# Patient Record
Sex: MALE | Race: WHITE | NOT HISPANIC OR LATINO | Employment: PART TIME | URBAN - METROPOLITAN AREA
[De-identification: names, ages, dates, MRNs, and addresses within clinical notes are randomized per-mention and may not be internally consistent; named-entity substitution may affect disease eponyms.]

---

## 2017-01-10 DIAGNOSIS — R56.9 SEIZURE (HCC): ICD-10-CM

## 2017-01-12 RX ORDER — LAMOTRIGINE 50 MG/1
TABLET, ORALLY DISINTEGRATING ORAL
Qty: 30 TAB | Refills: 6 | Status: SHIPPED | OUTPATIENT
Start: 2017-01-12 | End: 2017-05-15 | Stop reason: SDUPTHER

## 2017-02-19 DIAGNOSIS — R56.9 SEIZURE (HCC): ICD-10-CM

## 2017-02-21 RX ORDER — LEVETIRACETAM 750 MG/1
TABLET, FILM COATED, EXTENDED RELEASE ORAL
Qty: 60 TAB | Refills: 11 | Status: SHIPPED | OUTPATIENT
Start: 2017-02-21 | End: 2018-01-29 | Stop reason: SDUPTHER

## 2017-03-22 ENCOUNTER — OFFICE VISIT (OUTPATIENT)
Dept: PEDIATRICS | Facility: CLINIC | Age: 22
End: 2017-03-22
Payer: COMMERCIAL

## 2017-03-22 VITALS — WEIGHT: 198 LBS | BODY MASS INDEX: 29.33 KG/M2 | HEIGHT: 69 IN

## 2017-03-22 DIAGNOSIS — F41.9 ANXIETY DISORDER, UNSPECIFIED TYPE: ICD-10-CM

## 2017-03-22 DIAGNOSIS — F84.0 AUTISM: ICD-10-CM

## 2017-03-22 PROCEDURE — 90833 PSYTX W PT W E/M 30 MIN: CPT | Performed by: CLINICAL NURSE SPECIALIST

## 2017-03-22 PROCEDURE — 99214 OFFICE O/P EST MOD 30 MIN: CPT | Performed by: CLINICAL NURSE SPECIALIST

## 2017-03-22 RX ORDER — CITALOPRAM HYDROBROMIDE 10 MG/1
10 TABLET ORAL DAILY
Qty: 90 TAB | Refills: 0 | Status: SHIPPED | OUTPATIENT
Start: 2017-03-22 | End: 2017-05-15

## 2017-03-22 NOTE — MR AVS SNAPSHOT
"        Braydon Brothers Deepak   3/22/2017 4:30 PM   Office Visit   MRN: [id number]    Department:  r Med - Pediatrics   Dept Phone:  [phone]    Description:  Male : 1995   Provider:  IRAM Powell           Reason for Visit     Follow-Up     Medication Management           Allergies as of 3/22/2017     No Known Allergies      You were diagnosed with     Autism   [351803]       Anxiety disorder, unspecified type   [1070075]         Vital Signs     Height Weight Body Mass Index Smoking Status          1.753 m (5' 9\") 89.812 kg (198 lb) 29.23 kg/m2 Never Smoker         Basic Information     Date Of Birth Sex Race Ethnicity Preferred Language    1995 Male White Non- English      Your appointments     May 15, 2017  4:20 PM   Follow Up Visit with Melissa P Bloch, M.D.   [facility] Neurology (--)    [address] [place] [address], [address]  [facility] [address] [phone]           You will be receiving a confirmation call a few days before your appointment from our automated call confirmation system.              Problem List              ICD-10-CM Priority Class Noted - Resolved    Autism spectrum disorder F84.0   2015 - Present    Epilepsy (CMS-HCC) G40.909   2015 - Present    Autism F84.0   10/4/2016 - Present    Anxiety disorder F41.9   3/22/2017 - Present      Health Maintenance        Date Due Completion Dates    IMM HEP B VACCINE (1 of 3 - Primary Series) 1996 ---    IMM HEP A VACCINE (1 of 2 - Standard Series) 1997 ---    IMM HPV VACCINE (1 of 3 - Male 3 Dose Series) 2007 ---    IMM VARICELLA (CHICKENPOX) VACCINE (1 of 2 - 2 Dose Adolescent Series) 2009 ---    IMM MENINGOCOCCAL VACCINE (MCV4) (1 of 1) 2012 ---    IMM DTaP/Tdap/Td Vaccine (1 - Tdap) 2015 ---            Current Immunizations     Influenza Vaccine Quad Inj (Pf) 2016  7:06 AM, 10/23/2015  7:46 AM, 2014      Below and/or attached are the medications your provider expects " you to take. Review all of your home medications and newly ordered medications with your provider and/or pharmacist. Follow medication instructions as directed by your provider and/or pharmacist. Please keep your medication list with you and share with your provider. Update the information when medications are discontinued, doses are changed, or new medications (including over-the-counter products) are added; and carry medication information at all times in the event of emergency situations     Allergies:  No Known Allergies          Medications  Valid as of: March 22, 2017 -  4:49 PM    Generic Name Brand Name Tablet Size Instructions for use    Citalopram Hydrobromide (Tab) CELEXA 10 MG Take 1 Tab by mouth every day.        LamoTRIgine (Tab) LAMICTAL 25 MG TAKE ONE TABLET BY MOUTH ONCE DAILY        LamoTRIgine (TABLET DISPERSIBLE) LamoTRIgine 50 MG DISSOLVE ONE TABLET UNDER THE TONGUE ONCE DAILY        LevETIRAcetam (TABLET SR 24 HR) Levetiracetam 750 MG TAKE TWO TABLETS BY MOUTH IN THE EVENING AS NEEDED        .                 Medicines prescribed today were sent to:     [facility] [address] [place] [address] (S), [place] [facility] [place] [address] [facility] [place] ([place]) [place] [address]    Phone: [phone] Fax: [phone]    Open 24 Hours?: No      Medication refill instructions:       If your prescription bottle indicates you have medication refills left, it is not necessary to call your provider’s office. Please contact your pharmacy and they will refill your medication.    If your prescription bottle indicates you do not have any refills left, you may request refills at any time through one of the following ways: The online Designlab system (except Urgent Care), by calling your provider’s office, or by asking your pharmacy to contact your provider’s office with a refill request. Medication refills are processed only during regular business hours and may not be available until the next business day. Your provider may request  additional information or to have a follow-up visit with you prior to refilling your medication.   *Please Note: Medication refills are assigned a new Rx number when refilled electronically. Your pharmacy may indicate that no refills were authorized even though a new prescription for the same medication is available at the pharmacy. Please request the medicine by name with the pharmacy before contacting your provider for a refill.           Qbix Access Code: [email or web address]  Expires: 4/21/2017  4:49 PM    Qbix  A secure, online tool to manage your health information     [employer]’s Qbix® is a secure, online tool that connects you to your personalized health information from the privacy of your home -- day or night - making it very easy for you to manage your healthcare. Once the activation process is completed, you can even access your medical information using the Qbix lionel, which is available for free in the Apple Lionel store or Google Play store.     Qbix provides the following levels of access (as shown below):   My Chart Features   [facility] Doctor [facility]  Specialists [facility]  Urgent  Care Non-[facility]  Primary Care  Doctor   Email your healthcare team securely and privately 24/7 X X X    Manage appointments: schedule your next appointment; view details of past/upcoming appointments X      Request prescription refills. X      View recent personal medical records, including lab and immunizations X X X X   View health record, including health history, allergies, medications X X X X   Read reports about your outpatient visits, procedures, consult and ER notes X X X X   See your discharge summary, which is a recap of your hospital and/or ER visit that includes your diagnosis, lab results, and care plan. X X       How to register for Qbix:  1. Go to  [email or web address].  2. Click on the Sign Up Now box, which takes you to the New Member Sign Up page. You will need to provide the  following information:  a. Enter your Data Impact Access Code exactly as it appears at the top of this page. (You will not need to use this code after you’ve completed the sign-up process. If you do not sign up before the expiration date, you must request a new code.)   b. Enter your date of birth.   c. Enter your home email address.   d. Click Submit, and follow the next screen’s instructions.  3. Create a Data Impact ID. This will be your Data Impact login ID and cannot be changed, so think of one that is secure and easy to remember.  4. Create a Data Impact password. You can change your password at any time.  5. Enter your Password Reset Question and Answer. This can be used at a later time if you forget your password.   6. Enter your e-mail address. This allows you to receive e-mail notifications when new information is available in Data Impact.  7. Click Sign Up. You can now view your health information.    For assistance activating your Data Impact account, call [phone]
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
## 2017-05-15 ENCOUNTER — OFFICE VISIT (OUTPATIENT)
Dept: NEUROLOGY | Facility: MEDICAL CENTER | Age: 22
End: 2017-05-15
Payer: COMMERCIAL

## 2017-05-15 VITALS
WEIGHT: 184 LBS | RESPIRATION RATE: 16 BRPM | DIASTOLIC BLOOD PRESSURE: 76 MMHG | HEART RATE: 75 BPM | OXYGEN SATURATION: 94 % | TEMPERATURE: 97.9 F | BODY MASS INDEX: 27.16 KG/M2 | SYSTOLIC BLOOD PRESSURE: 114 MMHG

## 2017-05-15 DIAGNOSIS — G40.009 PARTIAL IDIOPATHIC EPILEPSY WITH SEIZURES OF LOCALIZED ONSET, NOT INTRACTABLE, WITHOUT STATUS EPILEPTICUS (HCC): ICD-10-CM

## 2017-05-15 DIAGNOSIS — R56.9 SEIZURE (HCC): ICD-10-CM

## 2017-05-15 DIAGNOSIS — F84.0 AUTISM SPECTRUM DISORDER: ICD-10-CM

## 2017-05-15 PROCEDURE — 99214 OFFICE O/P EST MOD 30 MIN: CPT | Performed by: PSYCHIATRY & NEUROLOGY

## 2017-05-15 RX ORDER — CITALOPRAM 20 MG/1
20 TABLET ORAL DAILY
Qty: 90 TAB | Refills: 3 | Status: SHIPPED | OUTPATIENT
Start: 2017-05-15 | End: 2018-09-17 | Stop reason: SDUPTHER

## 2017-05-15 RX ORDER — LAMOTRIGINE 50 MG/1
TABLET, ORALLY DISINTEGRATING ORAL
Qty: 30 TAB | Refills: 6 | Status: SHIPPED | OUTPATIENT
Start: 2017-05-15 | End: 2018-01-29 | Stop reason: SDUPTHER

## 2017-05-15 NOTE — MR AVS SNAPSHOT
Braydon Brothers Deepak   5/15/2017 4:20 PM   Office Visit   MRN: [id number]    Department:  [facility]   Dept Phone:  [phone]    Description:  Male : 1995   Provider:  Melissa P Bloch, M.D.           Reason for Visit     Follow-Up           Allergies as of 5/15/2017     No Known Allergies      You were diagnosed with     Partial idiopathic epilepsy with seizures of localized onset, not intractable, without status epilepticus (CMS-HCC)   [1815030]       Autism spectrum disorder   [465754]       Seizure (CMS-HCC)   [785192]         Vital Signs     Blood Pressure Pulse Temperature Respirations Weight Oxygen Saturation    114/76 mmHg 75 36.6 °C (97.9 °F) 16 83.462 kg (184 lb) 94%    Smoking Status                   Never Smoker            Basic Information     Date Of Birth Sex Race Ethnicity Preferred Language    1995 Male White Non- English      Your appointments     May 24, 2017  5:00 PM   NEW TO YOU with Megan Burciaga M.D.   [address] [place] [id number] [phone]            2017  4:20 PM   Follow Up Visit with Melissa P Bloch, M.D.   [facility] Neu[facility] (--)    [address] [place] [address], [address]  [place] [address] [phone]           You will be receiving a confirmation call a few days before your appointment from our automated call confirmation system.              Problem List              ICD-10-CM Priority Class Noted - Resolved    Autism spectrum disorder F84.0   2015 - Present    Epilepsy (CMS-HCC) G40.909   2015 - Present    Autism F84.0   10/4/2016 - Present    Anxiety disorder F41.9   3/22/2017 - Present      Health Maintenance        Date Due Completion Dates    IMM HEP B VACCINE (1 of 3 - Primary Series) 1996 ---    IMM HEP A VACCINE (1 of 2 - Standard Series) 1997 ---    IMM HPV VACCINE (1 of 3 - Male 3 Dose Series) 2007 ---    IMM VARICELLA (CHICKENPOX)  VACCINE (1 of 2 - 2 Dose Adolescent Series) 6/18/2009 ---    IMM MENINGOCOCCAL VACCINE (MCV4) (1 of 1) 6/18/2012 ---    IMM DTaP/Tdap/Td Vaccine (1 - Tdap) 6/18/2015 ---            Current Immunizations     Influenza Vaccine Quad Inj (Pf) 11/5/2016  7:06 AM, 10/23/2015  7:46 AM, 9/27/2014      Below and/or attached are the medications your provider expects you to take. Review all of your home medications and newly ordered medications with your provider and/or pharmacist. Follow medication instructions as directed by your provider and/or pharmacist. Please keep your medication list with you and share with your provider. Update the information when medications are discontinued, doses are changed, or new medications (including over-the-counter products) are added; and carry medication information at all times in the event of emergency situations     Allergies:  No Known Allergies          Medications  Valid as of: May 15, 2017 -  4:42 PM    Generic Name Brand Name Tablet Size Instructions for use    Citalopram Hydrobromide (Tab) CELEXA 20 MG Take 1 Tab by mouth every day.        LamoTRIgine (Tab) LAMICTAL 25 MG TAKE ONE TABLET BY MOUTH ONCE DAILY        LamoTRIgine (TABLET DISPERSIBLE) LamoTRIgine 50 MG DISSOLVE ONE TABLET UNDER THE TONGUE ONCE DAILY        LevETIRAcetam (TABLET SR 24 HR) Levetiracetam 750 MG TAKE TWO TABLETS BY MOUTH IN THE EVENING AS NEEDED        .                 Medicines prescribed today were sent to:     [facility] [place] ([place]), [place] - [address] [id number] [facility] [place] ([place]) [place] [address]    Phone: [phone] Fax: [phone]    Open 24 Hours?: No      Medication refill instructions:       If your prescription bottle indicates you have medication refills left, it is not necessary to call your provider’s office. Please contact your pharmacy and they will refill your medication.    If your prescription bottle indicates you do not have any refills left, you may request refills at any  time through one of the following ways: The online Thing5 system (except Urgent Care), by calling your provider’s office, or by asking your pharmacy to contact your provider’s office with a refill request. Medication refills are processed only during regular business hours and may not be available until the next business day. Your provider may request additional information or to have a follow-up visit with you prior to refilling your medication.   *Please Note: Medication refills are assigned a new Rx number when refilled electronically. Your pharmacy may indicate that no refills were authorized even though a new prescription for the same medication is available at the pharmacy. Please request the medicine by name with the pharmacy before contacting your provider for a refill.           Thing5 Access Code: [email or web address]  Expires: 6/14/2017  4:39 PM    Thing5  A secure, online tool to manage your health information     [employer]s Thing5® is a secure, online tool that connects you to your personalized health information from the privacy of your home -- day or night - making it very easy for you to manage your healthcare. Once the activation process is completed, you can even access your medical information using the Thing5 lionel, which is available for free in the Apple Lionel store or Google Play store.     Thing5 provides the following levels of access (as shown below):   My Chart Features   [facility] Primary Care Doctor [facility]  Specialists [facility]  Urgent  Care Non-[facility]  Primary Care  Doctor   Email your healthcare team securely and privately 24/7 X X X    Manage appointments: schedule your next appointment; view details of past/upcoming appointments X      Request prescription refills. X      View recent personal medical records, including lab and immunizations X X X X   View health record, including health history, allergies, medications X X X X   Read reports about your outpatient visits, procedures,  consult and ER notes X X X X   See your discharge summary, which is a recap of your hospital and/or ER visit that includes your diagnosis, lab results, and care plan. X X       How to register for mytrax:  1. Go to  [email or web address].  2. Click on the Sign Up Now box, which takes you to the New Member Sign Up page. You will need to provide the following information:  a. Enter your mytrax Access Code exactly as it appears at the top of this page. (You will not need to use this code after you’ve completed the sign-up process. If you do not sign up before the expiration date, you must request a new code.)   b. Enter your date of birth.   c. Enter your home email address.   d. Click Submit, and follow the next screen’s instructions.  3. Create a SHIMAUMA Print Systemt ID. This will be your SHIMAUMA Print Systemt login ID and cannot be changed, so think of one that is secure and easy to remember.  4. Create a SHIMAUMA Print Systemt password. You can change your password at any time.  5. Enter your Password Reset Question and Answer. This can be used at a later time if you forget your password.   6. Enter your e-mail address. This allows you to receive e-mail notifications when new information is available in mytrax.  7. Click Sign Up. You can now view your health information.    For assistance activating your mytrax account, call [phone]
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
## 2017-05-23 ENCOUNTER — TELEPHONE (OUTPATIENT)
Dept: MEDICAL GROUP | Age: 22
End: 2017-05-23

## 2017-05-23 NOTE — TELEPHONE ENCOUNTER
NEW PATIENT VISIT PRE-VISIT PLANNING    1.  EpicCare Patient is checked in Patient Demographics? YES    2.  Immunizations were updated in [facility] using WebIZ?: Yes       •  Web Iz Recommendations: HPV MENACTRA (MCV4)    3.  Patient is due for the following Health Maintenance Topics:   Health Maintenance Due   Topic Date Due   • IMM HPV VACCINE (1 of 3 - Male 3 Dose Series) 06/18/2007   • IMM MENINGOCOCCAL VACCINE (MCV4) (2 of 2) 06/18/2012           4.  Reviewed/Updated the following with patient:       •   Preferred Pharmacy? NO       •   Preferred Lab? NO       •   Medications? NO       •   Social History? NO       •   Family History? NO    5.  Updated Care Team?       •   DME Company (gait device, O2, CPAP, etc.) N\A       •   Other Specialists (eye doctor, derm, GYN, cardiology, endo, etc): N\A    6.  Patient was informed to arrive 15 min prior to their scheduled appointment and bring in their medication bottles? NO

## 2017-05-24 ENCOUNTER — OFFICE VISIT (OUTPATIENT)
Dept: MEDICAL GROUP | Age: 22
End: 2017-05-24
Payer: COMMERCIAL

## 2017-05-24 VITALS
DIASTOLIC BLOOD PRESSURE: 70 MMHG | OXYGEN SATURATION: 97 % | TEMPERATURE: 98.6 F | BODY MASS INDEX: 30.39 KG/M2 | HEART RATE: 89 BPM | WEIGHT: 205.2 LBS | HEIGHT: 69 IN | SYSTOLIC BLOOD PRESSURE: 124 MMHG

## 2017-05-24 DIAGNOSIS — Z23 NEED FOR VACCINATION: ICD-10-CM

## 2017-05-24 DIAGNOSIS — G40.001 PARTIAL IDIOPATHIC EPILEPSY WITH SEIZURES OF LOCALIZED ONSET, NOT INTRACTABLE, WITH STATUS EPILEPTICUS (HCC): ICD-10-CM

## 2017-05-24 DIAGNOSIS — F84.0 AUTISM SPECTRUM DISORDER: ICD-10-CM

## 2017-05-24 DIAGNOSIS — E66.9 OBESITY (BMI 30-39.9): ICD-10-CM

## 2017-05-24 PROCEDURE — 99204 OFFICE O/P NEW MOD 45 MIN: CPT | Mod: 25 | Performed by: FAMILY MEDICINE

## 2017-05-24 PROCEDURE — 90651 9VHPV VACCINE 2/3 DOSE IM: CPT | Performed by: FAMILY MEDICINE

## 2017-05-24 PROCEDURE — 90734 MENACWYD/MENACWYCRM VACC IM: CPT | Performed by: FAMILY MEDICINE

## 2017-05-24 PROCEDURE — 90472 IMMUNIZATION ADMIN EACH ADD: CPT | Performed by: FAMILY MEDICINE

## 2017-05-24 PROCEDURE — 90471 IMMUNIZATION ADMIN: CPT | Performed by: FAMILY MEDICINE

## 2017-05-24 RX ORDER — LORAZEPAM 1 MG/1
1 TABLET ORAL EVERY 4 HOURS PRN
COMMUNITY
End: 2020-01-06 | Stop reason: SDUPTHER

## 2017-05-24 RX ORDER — CITALOPRAM HYDROBROMIDE 10 MG/1
TABLET ORAL
COMMUNITY
Start: 2017-04-08 | End: 2017-11-13

## 2017-05-24 ASSESSMENT — PATIENT HEALTH QUESTIONNAIRE - PHQ9: CLINICAL INTERPRETATION OF PHQ2 SCORE: 0

## 2017-05-25 PROBLEM — Z23 NEED FOR VACCINATION: Status: ACTIVE | Noted: 2017-05-25

## 2017-05-25 PROBLEM — E66.9 OBESITY (BMI 30-39.9): Status: ACTIVE | Noted: 2017-05-25

## 2017-05-25 NOTE — PROGRESS NOTES
This medical record contains text that has been entered with the assistance of computer voice recognition and dictation software.  Therefore, it may contain unintended errors in text, spelling, punctuation, or grammar    Chief Complaint   Patient presents with   • Other     see reason for visit       Braydon Sotelo is a 21 y.o. male here evaluation and management of: Establish care, autism, epilepsy, obesity      HPI:     Autism spectrum disorder  He was diagnosed at age 4  Genetic testing was performed by Dr. 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 Bloch in neurology      Obesity (BMI 30-39.9)  Patient knows that he can improve with diet and exercise. He has not been focusing on exercise much, his mother agreed to help with the diet. denies any new fatigue, cold/heat intolerance, weight gain/weight loss, diarrhea/constipation, dry skin, myalgia, depressed mood, palpitations, tremmor, hair loss, and no goiter.        Current medicines (including changes today)  Current Outpatient Prescriptions   Medication Sig Dispense Refill   • citalopram (CELEXA) 10 MG tablet      • lorazepam (ATIVAN) 1 MG Tab Take 1 mg by mouth every four hours as needed for Anxiety.     • LamoTRIgine 50 MG TABLET DISPERSIBLE DISSOLVE ONE TABLET UNDER THE TONGUE ONCE DAILY 30 Tab 6   • Levetiracetam 750 MG TABLET SR 24 HR TAKE TWO TABLETS BY MOUTH IN THE EVENING AS NEEDED  "60 Tab 11   • citalopram (CELEXA) 20 MG Tab Take 1 Tab by mouth every day. 90 Tab 3   • lamotrigine (LAMICTAL) 25 MG Tab TAKE ONE TABLET BY MOUTH ONCE DAILY (Patient not taking: Reported on 5/24/2017) 30 Tab 0     No current facility-administered medications for this visit.     He  has a past medical history of Autism; Autism spectrum disorder; and Seizure (CMS-HCC).  He  has past surgical history that includes tonsillectomy and adenoidectomy and other (1/10/2014).  Social History   Substance Use Topics   • Smoking status: Never Smoker    • Smokeless tobacco: Never Used   • Alcohol Use: No     Social History     Social History Narrative     History reviewed. No pertinent family history.  No family status information on file.         ROS  Please see history of present illness    All other systems reviewed and are negative     Objective:     Blood pressure 124/70, pulse 89, temperature 37 °C (98.6 °F), height 1.753 m (5' 9.02\"), weight 93.078 kg (205 lb 3.2 oz), SpO2 97 %. Body mass index is 30.29 kg/(m^2).  Physical Exam:    Constitutional: Alert, no distress, does make eye contact  Skin: Warm, dry, good turgor, no rashes in visible areas.  Eye: Equal, round and reactive, conjunctiva clear, lids normal.  ENMT: Lips without lesions, good dentition, oropharynx clear.  Neck: Trachea midline, no masses, no thyromegaly. No cervical or supraclavicular lymphadenopathy.  Respiratory: Unlabored respiratory effort, lungs clear to auscultation, no wheezes, no ronchi.  Cardiovascular: Normal S1, S2, no murmur, no edema.  Abdomen: Soft, non-tender, no masses, no hepatosplenomegaly.  Psych: Alert and oriented x3, normal affect and mood.          Assessment and Plan:   The following treatment plan was discussed, again this medical record contains text that has been entered with the assistance of computer voice recognition and dictation software.  Therefore, it may contain unintended errors in text, spelling, punctuation, or " grammar      1. Autism spectrum disorder  Patient has been stable with current management  We will make no changes for now    2. Need for vaccination  Given today  - GARDASIL 9  - MENINGOCOCCAL CONJUGATE VACCINE 4-VALENT IM      3. Partial idiopathic epilepsy with seizures of localized onset, not intractable, with status epilepticus (CMS-HCC)  No seizures since March 2016  Continue current management  Avoid triggers such as fatigue, dehydration, and alcohol    4. Obesity (BMI 30-39.9)    We discussed agressive lifestyle modifications with weight loss, aerobic exercise, and eating a prudent diet, which  included avoiding fried foods, using low-fat milk and avoiding simple carbohydrate, making a food diary. If you can't run because of pain do the stationary bike/elipticle or swim 30minutes 3 times per wk, in the beginning and then gradually increase.        HEALTH MAINTENANCE: up to date    Followup: Return in about 3 months (around 8/24/2017) for Medication refill.

## 2017-05-25 NOTE — ASSESSMENT & PLAN NOTE
Patient knows that he can improve with diet and exercise. He has not been focusing on exercise much, his mother agreed to help with the diet. denies any new fatigue, cold/heat intolerance, weight gain/weight loss, diarrhea/constipation, dry skin, myalgia, depressed mood, palpitations, tremmor, hair loss, and no goiter.

## 2017-05-25 NOTE — ASSESSMENT & PLAN NOTE
Triggered by fatigue  Last seizure was in March 2016  Complies with Keppra 1500mg po qhs  Ativan 1mg prn  Also Lamotrigine 50mg po qam  Managed by Dr. Bloch in neurology

## 2017-05-25 NOTE — ASSESSMENT & PLAN NOTE
He was diagnosed at age 4  Genetic testing was performed by Dr. 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

## 2017-07-25 ENCOUNTER — TELEPHONE (OUTPATIENT)
Dept: MEDICAL GROUP | Age: 22
End: 2017-07-25

## 2017-07-25 DIAGNOSIS — Z23 NEED FOR VACCINATION: ICD-10-CM

## 2017-07-26 ENCOUNTER — NON-PROVIDER VISIT (OUTPATIENT)
Dept: MEDICAL GROUP | Age: 22
End: 2017-07-26
Payer: COMMERCIAL

## 2017-07-26 DIAGNOSIS — Z23 NEED FOR VACCINATION: ICD-10-CM

## 2017-07-26 PROCEDURE — 90471 IMMUNIZATION ADMIN: CPT | Performed by: FAMILY MEDICINE

## 2017-07-26 PROCEDURE — 90651 9VHPV VACCINE 2/3 DOSE IM: CPT | Performed by: FAMILY MEDICINE

## 2017-07-26 NOTE — MR AVS SNAPSHOT
Braydon Sotelo   2017 4:30 PM   Non-Provider Visit   MRN: [id number]    Department:  [address] [place] [address]   Dept Phone:  [phone]    Description:  Male : 1995   Provider:  LUKAS MAYO MA           Reason for Visit     Immunizations HPV #2      Allergies as of 2017     No Known Allergies      Vital Signs     Smoking Status                   Never Smoker            Basic Information     Date Of Birth Sex Race Ethnicity Preferred Language    1995 Male White Non- English      Your appointments     2017  4:20 PM   Follow Up Visit with Melissa P Bloch, M.D.   [facility] (--)    [address], [address]  [place] [address] [phone]           You will be receiving a confirmation call a few days before your appointment from our automated call confirmation system.            2017  4:30 PM   Non Provider 1 with LUKAS MAYO MA   [address])    [address]  [place] [address] [phone]           You will be receiving a confirmation call a few days before your appointment from our automated call confirmation system.              Problem List              ICD-10-CM Priority Class Noted - Resolved    Autism spectrum disorder F84.0   2015 - Present    Epilepsy (CMS-HCC) G40.909   2015 - Present    Autism F84.0   10/4/2016 - Present    Anxiety disorder F41.9   3/22/2017 - Present    Obesity (BMI 30-39.9) E66.9   2017 - Present    Need for vaccination Z23   2017 - Present      Health Maintenance        Date Due Completion Dates    IMM INFLUENZA (1) 2017, 10/23/2015, 2014, 10/14/2013, 10/6/2012, 2011, 10/22/2010, 10/24/2009    IMM HPV VACCINE (3 of 3 - Male 3 Dose Series) 2017, 2017    IMM DTaP/Tdap/Td Vaccine (7 - Td) 2018, 2000, 9/15/1997, 1996, 1996, 1996            Current Immunizations     DTP/HIB Combined Vaccine 1996, 1996, 1996    Dtap Vaccine 9/13/2000, 9/15/1997    HPV 9-VALENT VACCINE (GARDASIL 9) 7/26/2017, 5/24/2017    Hepatitis A Vaccine, Ped/Adol 4/16/2004, 8/15/2002    Hepatitis B Vaccine Non-Recombivax (Ped/Adol) 3/8/2002, 1996, 1996, 1996    IPV 9/13/2000    Influenza TIV (IM) 9/30/2011, 10/22/2010, 10/24/2009    Influenza Vac Subunit Quad Inj (Pf) 10/6/2012    Influenza Vaccine Quad Inj (Pf) 11/5/2016  7:06 AM, 10/23/2015  7:46 AM, 9/27/2014    Influenza Vaccine Quad Inj (Preserved) 10/14/2013    MMR Vaccine 9/13/2000, 9/15/1997    Meningococcal Conjugate Vaccine MCV4 (Menactra) 5/24/2017    Meningococcal Conjugate Vaccine MCV4 (Menveo) 5/5/2008    OPV - Historical Data 1996, 1996, 1996    Tdap Vaccine 4/6/2008    Varicella Vaccine Live 5/5/2008, 6/18/1997      Below and/or attached are the medications your provider expects you to take. Review all of your home medications and newly ordered medications with your provider and/or pharmacist. Follow medication instructions as directed by your provider and/or pharmacist. Please keep your medication list with you and share with your provider. Update the information when medications are discontinued, doses are changed, or new medications (including over-the-counter products) are added; and carry medication information at all times in the event of emergency situations     Allergies:  No Known Allergies          Medications  Valid as of: July 26, 2017 -  4:46 PM    Generic Name Brand Name Tablet Size Instructions for use    Citalopram Hydrobromide (Tab) CELEXA 20 MG Take 1 Tab by mouth every day.        Citalopram Hydrobromide (Tab) CELEXA 10 MG         LamoTRIgine (Tab) LAMICTAL 25 MG TAKE ONE TABLET BY MOUTH ONCE DAILY        LamoTRIgine (TABLET DISPERSIBLE) LamoTRIgine 50 MG DISSOLVE ONE TABLET UNDER THE TONGUE ONCE DAILY        LevETIRAcetam (TABLET SR 24 HR) Levetiracetam 750 MG TAKE TWO  TABLETS BY MOUTH IN THE EVENING AS NEEDED        LORazepam (Tab) ATIVAN 1 MG Take 1 mg by mouth every four hours as needed for Anxiety.        .                 Medicines prescribed today were sent to:     [address] ([address]), [place] [address]    [facility] [place] [address]    Phone: [phone] Fax: [phone]    Open 24 Hours?: No      Medication refill instructions:       If your prescription bottle indicates you have medication refills left, it is not necessary to call your provider’s office. Please contact your pharmacy and they will refill your medication.    If your prescription bottle indicates you do not have any refills left, you may request refills at any time through one of the following ways: The online AppInstitute system (except Urgent Care), by calling your provider’s office, or by asking your pharmacy to contact your provider’s office with a refill request. Medication refills are processed only during regular business hours and may not be available until the next business day. Your provider may request additional information or to have a follow-up visit with you prior to refilling your medication.   *Please Note: Medication refills are assigned a new Rx number when refilled electronically. Your pharmacy may indicate that no refills were authorized even though a new prescription for the same medication is available at the pharmacy. Please request the medicine by name with the pharmacy before contacting your provider for a refill.           AppInstitute Access Code: [email or web address]  Expires: 8/25/2017  4:26 PM    AppInstitute  A secure, online tool to manage your health information     [employer] AppInstitute® is a secure, online tool that connects you to your personalized health information from the privacy of your home -- day or night - making it very easy for you to manage your healthcare. Once the activation process is completed, you can even access your medical information using  the Your Last Chance lionel, which is available for free in the Apple Lionel store or Google Play store.     Your Last Chance provides the following levels of access (as shown below):   My Chart Features   [facility] Doctor [facility]  Specialists [facility]  Urgent  Care Non-[facility]  Primary Care  Doctor   Email your healthcare team securely and privately 24/7 X X X    Manage appointments: schedule your next appointment; view details of past/upcoming appointments X      Request prescription refills. X      View recent personal medical records, including lab and immunizations X X X X   View health record, including health history, allergies, medications X X X X   Read reports about your outpatient visits, procedures, consult and ER notes X X X X   See your discharge summary, which is a recap of your hospital and/or ER visit that includes your diagnosis, lab results, and care plan. X X       How to register for Your Last Chance:  1. Go to  [email or web address].  2. Click on the Sign Up Now box, which takes you to the New Member Sign Up page. You will need to provide the following information:  a. Enter your Your Last Chance Access Code exactly as it appears at the top of this page. (You will not need to use this code after you’ve completed the sign-up process. If you do not sign up before the expiration date, you must request a new code.)   b. Enter your date of birth.   c. Enter your home email address.   d. Click Submit, and follow the next screen’s instructions.  3. Create a Your Last Chance ID. This will be your Your Last Chance login ID and cannot be changed, so think of one that is secure and easy to remember.  4. Create a Your Last Chance password. You can change your password at any time.  5. Enter your Password Reset Question and Answer. This can be used at a later time if you forget your password.   6. Enter your e-mail address. This allows you to receive e-mail notifications when new information is available in Your Last Chance.  7. Click Sign Up. You can now view your health  information.    For assistance activating your Yoopay account, call [phone]

## 2017-07-26 NOTE — PROGRESS NOTES
"Braydon Sotelo is a 22 y.o. male here for a non-provider visit for:   HPV 2 of 3    Reason for immunization: continue or complete series started at the office  Immunization records indicate need for vaccine: Yes, confirmed with Epic  Minimum interval has been met for this vaccine: Yes  ABN completed: Not Indicated    Order and dose verified by: CE  VIS Dated  12/2/16 was given to patient: Yes  All IAC Questionnaire questions were answered “No.\"    Patient tolerated injection and no adverse effects were observed or reported: Yes    Pt scheduled for next dose in series: Yes  "

## 2017-08-17 ENCOUNTER — TELEPHONE (OUTPATIENT)
Dept: NEUROLOGY | Facility: MEDICAL CENTER | Age: 22
End: 2017-08-17

## 2017-08-17 NOTE — TELEPHONE ENCOUNTER
Message: patient called requesting a call back.    Caller: lucas  Call Back #: [phone]  OK to leave detailed message: n    Left message for patient to call back.

## 2017-08-18 NOTE — TELEPHONE ENCOUNTER
Patient called back and wanted to know the dose of the celexa. I told the patient that he's supposed to be taking:   Order Providers      Prescribing Provider Encounter Provider     Melissa P Bloch, M.D. Melissa P Bloch, M.D.       Medication Detail         Disp Refills Start End        citalopram (CELEXA) 20 MG Tab 90 Tab 3/3 5/15/2017       Sig - Route: Take 1 Tab by mouth every day. - Oral      Class: Normal      All information was given and pt and he verbally understood BLR

## 2017-10-25 ENCOUNTER — TELEPHONE (OUTPATIENT)
Dept: MEDICAL GROUP | Age: 22
End: 2017-10-25

## 2017-10-25 DIAGNOSIS — R56.9 SEIZURES (HCC): ICD-10-CM

## 2017-10-25 NOTE — TELEPHONE ENCOUNTER
1. Caller Name: [place] (guillermo)                                         Call Back Number: [phone] (home)         Patient approves a detailed voicemail message: yes    2. SPECIFIC Action To Be Taken: Referral pending, please sign.    3. Diagnosis/Clinical Reason for Request: seizures    4. Specialty & Provider Name/Lab/Imaging Location: Dr. Melissa Bloch    5. Is appointment scheduled for requested order/referral: yes - 11/13/17    Patient informed they will receive a return phone call from the office ONLY if there are any questions before processing their request. Advised to call back if they haven't received a call from the referral department in 5 days.

## 2017-11-13 ENCOUNTER — OFFICE VISIT (OUTPATIENT)
Dept: NEUROLOGY | Facility: MEDICAL CENTER | Age: 22
End: 2017-11-13
Payer: COMMERCIAL

## 2017-11-13 VITALS
SYSTOLIC BLOOD PRESSURE: 118 MMHG | BODY MASS INDEX: 31.4 KG/M2 | HEART RATE: 72 BPM | DIASTOLIC BLOOD PRESSURE: 70 MMHG | RESPIRATION RATE: 16 BRPM | WEIGHT: 212 LBS | TEMPERATURE: 97.3 F | HEIGHT: 69 IN | OXYGEN SATURATION: 96 %

## 2017-11-13 DIAGNOSIS — F84.0 AUTISM SPECTRUM DISORDER: ICD-10-CM

## 2017-11-13 DIAGNOSIS — G40.009 PARTIAL IDIOPATHIC EPILEPSY WITH SEIZURES OF LOCALIZED ONSET, NOT INTRACTABLE, WITHOUT STATUS EPILEPTICUS (HCC): ICD-10-CM

## 2017-11-13 PROCEDURE — 99214 OFFICE O/P EST MOD 30 MIN: CPT | Performed by: PSYCHIATRY & NEUROLOGY

## 2017-11-14 NOTE — ASSESSMENT & PLAN NOTE
Pt has had some issues with a recent relationship. Pt states he was sad for awhile. Pt states that he feels like he needs to do more exercise.

## 2017-11-14 NOTE — PROGRESS NOTES
CHIEF COMPLAINT  Chief Complaint   Patient presents with   • Follow-Up     Seizures       HPI  Braydon Sotelo is a 19 y.o. male who presents for epilepsy evaluation and his autism has been at times problematic as he has difficulty making adjustments.  Epilepsy  No seizures witnessed or that patient I aware about.    Autism spectrum disorder  Pt has had some issues with a recent relationship. Pt states he was sad for awhile. Pt states that he feels like he needs to do more exercise.    REVIEW OF SYSTEMS  Pertinent Positives:insomnia, learning disability, tangential, anxious about seizures, irritable.   All other systems are negative.     PAST MEDICAL HISTORY  Past Medical History:   Diagnosis Date   • Autism    • Autism spectrum disorder    • Seizure (CMS-[facility])     since 2013       SOCIAL HISTORY  Social History     Social History   • Marital status: Single     Spouse name: N/A   • Number of children: N/A   • Years of education: N/A     Occupational History   • Not on file.     Social History Main Topics   • Smoking status: Never Smoker   • Smokeless tobacco: Never Used   • Alcohol use No   • Drug use: No   • Sexual activity: Not on file     Other Topics Concern   • Not on file     Social History Narrative   • No narrative on file       SURGICAL HISTORY  Past Surgical History:   Procedure Laterality Date   • OTHER  1/10/2014    wisdom teeth extraction   • TONSILLECTOMY AND ADENOIDECTOMY         CURRENT MEDICATIONS  Current Outpatient Prescriptions   Medication Sig Dispense Refill   • lorazepam (ATIVAN) 1 MG Tab Take 1 mg by mouth every four hours as needed for Anxiety.     • citalopram (CELEXA) 20 MG Tab Take 1 Tab by mouth every day. 90 Tab 3   • LamoTRIgine 50 MG TABLET DISPERSIBLE DISSOLVE ONE TABLET UNDER THE TONGUE ONCE DAILY 30 Tab 6   • Levetiracetam 750 MG TABLET SR 24 HR TAKE TWO TABLETS BY MOUTH IN THE EVENING AS NEEDED 60 Tab 11     No current facility-administered medications for this visit.   "      ALLERGIES  No Known Allergies    PHYSICAL EXAM  VITAL SIGNS: /70   Pulse 72   Temp 36.3 °C (97.3 °F)   Resp 16   Ht 1.753 m (5' 9\")   Wt 96.2 kg (212 lb)   SpO2 96%   BMI 31.31 kg/m²   Constitutional: Well developed, Well nourished, No acute distress, Non-toxic appearance.   HENT: normocephalic, atraumatic   Eyes: perrl   Neck: Normal range of motion, No tenderness, Supple, No stridor.   Cardiovascular: Normal heart rate, Normal rhythm, No murmurs, No rubs, No gallops.   Thorax & Lungs: Normal breath sounds, No respiratory distress, No wheezing, No chest tenderness.   Abdomen: Bowel sounds normal, Soft, No tenderness, No masses, No pulsatile masses.   Skin: Warm, Dry, No erythema, No rash.   Back: No tenderness, No CVA tenderness.   Extremities: Intact distal pulses, No edema, No tenderness, No cyanosis, No clubbing.   Neurologic: A&OX3, CN:2-12 intact, motor: intact, sensory intact, gait intact   Psychiatric: Affect normal, Judgment normal, Mood normal.   Lab: Reviewed with patient in detail and as noted in results.    EEG  Normal per Dr. Corona    RADIOLOGY/PROCEDURES  Mri normal of the brain    ASSESSMENT AND PLAN  1. Autism spectrum disorder   Patient's behavior is still stable at times so will continue the Celexa to 20 mg. Pt has early am and then he takes a nap and has not done enough exercise.    2. Epilepsy   Pt to be on 1500mg KeppraXR QHS. Continue Lamictal 50 mg mg po q am and consider a transition to lamictal if he tolerates it. Ativan prn breakthrough.       I spent 25 minutes with this patient and his mother, over fifty percent was spent counseling patient on their condition, best management practices, reviewing test results and risks and benefits of treatment.            "

## 2017-11-15 ENCOUNTER — TELEPHONE (OUTPATIENT)
Dept: NEUROLOGY | Facility: MEDICAL CENTER | Age: 22
End: 2017-11-15

## 2017-11-15 NOTE — LETTER
November 16, 2017        Braydon Brothers Sotelo  [address]  [place] [address]        To whom it may concern:    Braydon Sotelo is my patient and is cleared to join a gym and take MMA classes/training.     If you have any questions or concerns, please don't hesitate to call.        Sincerely,          Melissa Bloch MD    Electronically Signed

## 2017-11-15 NOTE — TELEPHONE ENCOUNTER
Patient's mother called needing a letter from Dr Bloch stating that he can do MMA classes and training. Please advise

## 2017-11-29 ENCOUNTER — APPOINTMENT (OUTPATIENT)
Dept: MEDICAL GROUP | Age: 22
End: 2017-11-29
Payer: MEDICAID

## 2017-11-29 ENCOUNTER — TELEPHONE (OUTPATIENT)
Dept: MEDICAL GROUP | Age: 22
End: 2017-11-29

## 2017-11-29 DIAGNOSIS — Z23 NEED FOR VACCINATION: ICD-10-CM

## 2017-12-27 ENCOUNTER — NON-PROVIDER VISIT (OUTPATIENT)
Dept: MEDICAL GROUP | Age: 22
End: 2017-12-27
Payer: COMMERCIAL

## 2017-12-27 PROCEDURE — 90471 IMMUNIZATION ADMIN: CPT | Performed by: FAMILY MEDICINE

## 2017-12-27 PROCEDURE — 90651 9VHPV VACCINE 2/3 DOSE IM: CPT | Performed by: FAMILY MEDICINE

## 2017-12-27 NOTE — PROGRESS NOTES
Braydon Sotelo is a 22 y.o. male here for a non-provider visit for:   HPV 3 of 3    Reason for immunization: continue or complete series started at the office  Immunization records indicate need for vaccine: Yes, confirmed with Epic and confirmed with [place] [employer]  Minimum interval has been met for this vaccine: Yes  ABN completed: Yes    Order and dose verified by: RODNEY  VIS Dated  12/2/16 was given to patient: Yes  IAC Questionnaire abnormal. Questionnaire reviewed and administration of injection approved by provider: Patrica    Patient tolerated injection and no adverse effects were observed or reported: Yes    Pt scheduled for next dose in series: Not Indicated

## 2018-01-03 ENCOUNTER — TELEPHONE (OUTPATIENT)
Dept: NEUROLOGY | Facility: MEDICAL CENTER | Age: 23
End: 2018-01-03

## 2018-01-03 NOTE — TELEPHONE ENCOUNTER
Pt with appt 2/13/18 mom would like a name for a therapist for pt's autism, depression, and anxiety. Mom also mentioned pt is on Celexa. Please advise

## 2018-01-09 DIAGNOSIS — F41.9 ANXIETY: ICD-10-CM

## 2018-01-09 DIAGNOSIS — F84.0 AUTISM: ICD-10-CM

## 2018-01-09 NOTE — TELEPHONE ENCOUNTER
Mother of pt called asking if pt can get referral to therapy for his anxiety, depression and anger management please

## 2018-01-29 DIAGNOSIS — R56.9 SEIZURE (HCC): ICD-10-CM

## 2018-01-29 RX ORDER — LAMOTRIGINE 50 MG/1
TABLET, ORALLY DISINTEGRATING ORAL
Qty: 30 TAB | Refills: 6 | Status: SHIPPED | OUTPATIENT
Start: 2018-01-29 | End: 2018-09-17 | Stop reason: SDUPTHER

## 2018-01-29 RX ORDER — LEVETIRACETAM 750 MG/1
TABLET, FILM COATED, EXTENDED RELEASE ORAL
Qty: 60 TAB | Refills: 11 | Status: SHIPPED | OUTPATIENT
Start: 2018-01-29 | End: 2018-09-17 | Stop reason: SDUPTHER

## 2018-01-29 NOTE — TELEPHONE ENCOUNTER
Would you be able to refill these two medication for pt. Primary insurance is [facility], but secondary is medicaid, they do not want to pay copay. Thank you.

## 2018-02-13 ENCOUNTER — TELEPHONE (OUTPATIENT)
Dept: NEUROLOGY | Facility: MEDICAL CENTER | Age: 23
End: 2018-02-13

## 2018-02-13 ENCOUNTER — OFFICE VISIT (OUTPATIENT)
Dept: NEUROLOGY | Facility: MEDICAL CENTER | Age: 23
End: 2018-02-13
Payer: COMMERCIAL

## 2018-02-13 VITALS
TEMPERATURE: 98.4 F | DIASTOLIC BLOOD PRESSURE: 72 MMHG | HEART RATE: 101 BPM | BODY MASS INDEX: 31.55 KG/M2 | HEIGHT: 69 IN | OXYGEN SATURATION: 96 % | WEIGHT: 213 LBS | SYSTOLIC BLOOD PRESSURE: 118 MMHG

## 2018-02-13 DIAGNOSIS — G40.009 PARTIAL IDIOPATHIC EPILEPSY WITH SEIZURES OF LOCALIZED ONSET, NOT INTRACTABLE, WITHOUT STATUS EPILEPTICUS (HCC): ICD-10-CM

## 2018-02-13 DIAGNOSIS — F84.0 AUTISM SPECTRUM DISORDER: ICD-10-CM

## 2018-02-13 PROCEDURE — 99214 OFFICE O/P EST MOD 30 MIN: CPT | Performed by: PSYCHIATRY & NEUROLOGY
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
## 2018-05-14 ENCOUNTER — OFFICE VISIT (OUTPATIENT)
Dept: NEUROLOGY | Facility: MEDICAL CENTER | Age: 23
End: 2018-05-14
Payer: COMMERCIAL

## 2018-05-14 VITALS
WEIGHT: 220 LBS | OXYGEN SATURATION: 94 % | DIASTOLIC BLOOD PRESSURE: 74 MMHG | HEIGHT: 69 IN | BODY MASS INDEX: 32.58 KG/M2 | TEMPERATURE: 97.2 F | HEART RATE: 71 BPM | SYSTOLIC BLOOD PRESSURE: 108 MMHG

## 2018-05-14 DIAGNOSIS — F84.0 AUTISM SPECTRUM DISORDER: ICD-10-CM

## 2018-05-14 DIAGNOSIS — G40.009 PARTIAL IDIOPATHIC EPILEPSY WITH SEIZURES OF LOCALIZED ONSET, NOT INTRACTABLE, WITHOUT STATUS EPILEPTICUS (HCC): ICD-10-CM

## 2018-05-14 PROCEDURE — 99214 OFFICE O/P EST MOD 30 MIN: CPT | Performed by: PSYCHIATRY & NEUROLOGY

## 2018-05-14 ASSESSMENT — PATIENT HEALTH QUESTIONNAIRE - PHQ9: CLINICAL INTERPRETATION OF PHQ2 SCORE: 0

## 2018-05-14 NOTE — PROGRESS NOTES
"CHIEF COMPLAINT  Chief Complaint   Patient presents with   • Follow-Up     autism       HPI  Braydon Sotelo is a 19 y.o. male who presents for epilepsy evaluation and his autism has been at times problematic as he has difficulty making adjustments.  Epilepsy  No seizures since the last visit.    Autism spectrum disorder  Pt has some sad feeling about girls.    REVIEW OF SYSTEMS  Pertinent Positives:insomnia, learning disability, tangential, anxious about seizures, irritable.   All other systems are negative.     PAST MEDICAL HISTORY  Past Medical History:   Diagnosis Date   • Autism    • Autism spectrum disorder    • Seizure (HCC)     since 2013       SOCIAL HISTORY  Social History     Social History   • Marital status: Single     Spouse name: N/A   • Number of children: N/A   • Years of education: N/A     Occupational History   • Not on file.     Social History Main Topics   • Smoking status: Never Smoker   • Smokeless tobacco: Never Used   • Alcohol use No   • Drug use: No   • Sexual activity: Not on file     Other Topics Concern   • Not on file     Social History Narrative   • No narrative on file       SURGICAL HISTORY  Past Surgical History:   Procedure Laterality Date   • OTHER  1/10/2014    wisdom teeth extraction   • TONSILLECTOMY AND ADENOIDECTOMY         CURRENT MEDICATIONS  Current Outpatient Prescriptions   Medication Sig Dispense Refill   • LamoTRIgine 50 MG TABLET DISPERSIBLE DISSOLVE ONE TABLET UNDER THE TONGUE ONCE DAILY 30 Tab 6   • Levetiracetam 750 MG TABLET SR 24 HR TAKE TWO TABLETS BY MOUTH IN THE EVENING AS NEEDED 60 Tab 11   • lorazepam (ATIVAN) 1 MG Tab Take 1 mg by mouth every four hours as needed for Anxiety.     • citalopram (CELEXA) 20 MG Tab Take 1 Tab by mouth every day. 90 Tab 3     No current facility-administered medications for this visit.        ALLERGIES  No Known Allergies    PHYSICAL EXAM  VITAL SIGNS: /74   Pulse 71   Temp 36.2 °C (97.2 °F)   Ht 1.753 m (5' 9\")  "  Wt 99.8 kg (220 lb)   SpO2 94%   BMI 32.49 kg/m²   Constitutional: Well developed, Well nourished, No acute distress, Non-toxic appearance.   HENT: normocephalic, atraumatic   Eyes: perrl   Neck: Normal range of motion, No tenderness, Supple, No stridor.   Cardiovascular: Normal heart rate, Normal rhythm, No murmurs, No rubs, No gallops.   Thorax & Lungs: Normal breath sounds, No respiratory distress, No wheezing, No chest tenderness.   Abdomen: Bowel sounds normal, Soft, No tenderness, No masses, No pulsatile masses.   Skin: Warm, Dry, No erythema, No rash.   Back: No tenderness, No CVA tenderness.   Extremities: Intact distal pulses, No edema, No tenderness, No cyanosis, No clubbing.   Neurologic: A&OX3, CN:2-12 intact, motor: intact, sensory intact, gait intact   Psychiatric: Affect normal, Judgment normal, Mood normal.   Lab: Reviewed with patient in detail and as noted in results.    EEG  Normal per Dr. Corona    RADIOLOGY/PROCEDURES  Mri normal of the brain    ASSESSMENT AND PLAN  1. Autism spectrum disorder   Patient's behavior is still stable at times so will continue the Celexa to 20 mg. Pt is seen at therapist for his explosive anger disorder. Continue with therapist.    2. Epilepsy   Continue 750 mg KeppraXR QHS. Continue Lamictal 100 mg po q am and consider a transition to lamictal if he tolerates it. Ativan prn breakthrough.       I spent 25 minutes with this patient and his MOM face to face, over fifty percent was spent counseling patient on their condition, best management practices, reviewing test results and risks and benefits of treatment.

## 2018-05-22 ENCOUNTER — TELEPHONE (OUTPATIENT)
Dept: NEUROLOGY | Facility: MEDICAL CENTER | Age: 23
End: 2018-05-22

## 2018-05-22 NOTE — TELEPHONE ENCOUNTER
Mother called states he had a seizure last night. Wants to let dr Bloch know. He was shaking, and eventually ended up on the floor. It lasted for at least 3-4 min. Not sure what brought it on.   When woke up he was a little slow. Did not go to work today.     Wants to know if he can have a refill for Ativan. The last time he got it it was about 2 years ago.

## 2018-05-22 NOTE — TELEPHONE ENCOUNTER
Per dr Bloch we can do another EEG or MRI since it has been a long time.  Called back advised mother. She would like dr Bloch to order EEG and possibly the MRI if she could.

## 2018-06-07 DIAGNOSIS — G40.009 PARTIAL IDIOPATHIC EPILEPSY WITH SEIZURES OF LOCALIZED ONSET, NOT INTRACTABLE, WITHOUT STATUS EPILEPTICUS (HCC): ICD-10-CM

## 2018-06-08 NOTE — TELEPHONE ENCOUNTER
EEG ordered and ok to call in a refill of the ativan. Please check with his mom on how he has done in the last 2 weeks. Thanks MB

## 2018-06-11 NOTE — TELEPHONE ENCOUNTER
Ativan was already sent to pharmacy.     Pt states he feels better. Relaxed watching TV. Radiology should be calling to schedule EEG. agreed

## 2018-09-17 ENCOUNTER — OFFICE VISIT (OUTPATIENT)
Dept: NEUROLOGY | Facility: MEDICAL CENTER | Age: 23
End: 2018-09-17
Payer: COMMERCIAL

## 2018-09-17 VITALS
TEMPERATURE: 97.8 F | WEIGHT: 216 LBS | OXYGEN SATURATION: 96 % | HEIGHT: 69 IN | BODY MASS INDEX: 31.99 KG/M2 | DIASTOLIC BLOOD PRESSURE: 78 MMHG | HEART RATE: 78 BPM | SYSTOLIC BLOOD PRESSURE: 116 MMHG

## 2018-09-17 DIAGNOSIS — G40.009 PARTIAL IDIOPATHIC EPILEPSY WITH SEIZURES OF LOCALIZED ONSET, NOT INTRACTABLE, WITHOUT STATUS EPILEPTICUS (HCC): ICD-10-CM

## 2018-09-17 DIAGNOSIS — F84.0 AUTISM SPECTRUM DISORDER: ICD-10-CM

## 2018-09-17 DIAGNOSIS — R56.9 SEIZURE (HCC): ICD-10-CM

## 2018-09-17 PROCEDURE — 99214 OFFICE O/P EST MOD 30 MIN: CPT | Performed by: PSYCHIATRY & NEUROLOGY

## 2018-09-17 RX ORDER — LEVETIRACETAM 750 MG/1
TABLET, FILM COATED, EXTENDED RELEASE ORAL
Qty: 60 TAB | Refills: 11 | Status: SHIPPED | OUTPATIENT
Start: 2018-09-17 | End: 2019-04-10 | Stop reason: SDUPTHER

## 2018-09-17 RX ORDER — LAMOTRIGINE 50 MG/1
TABLET, ORALLY DISINTEGRATING ORAL
Qty: 30 TAB | Refills: 6 | Status: SHIPPED | OUTPATIENT
Start: 2018-09-17 | End: 2019-04-10

## 2018-09-17 RX ORDER — CITALOPRAM 20 MG/1
20 TABLET ORAL DAILY
Qty: 90 TAB | Refills: 3 | Status: SHIPPED | OUTPATIENT
Start: 2018-09-17 | End: 2019-12-24
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
## 2018-10-11 ENCOUNTER — NON-PROVIDER VISIT (OUTPATIENT)
Dept: NEUROLOGY | Facility: MEDICAL CENTER | Age: 23
End: 2018-10-11
Payer: COMMERCIAL

## 2018-10-11 DIAGNOSIS — G40.009 PARTIAL IDIOPATHIC EPILEPSY WITH SEIZURES OF LOCALIZED ONSET, NOT INTRACTABLE, WITHOUT STATUS EPILEPTICUS (HCC): ICD-10-CM

## 2018-10-11 PROCEDURE — 95951 EEG: CPT | Mod: 52 | Performed by: PSYCHIATRY & NEUROLOGY

## 2018-10-12 NOTE — PROCEDURES
VIDEO ELECTROENCEPHALOGRAM REPORT      Referring MD: Dr. Bloch DOS:  (total recording of minutes).     INDICATION:  Braydon Sotelo 23 y.o. male presenting with history of epilepsy and autism with occasional breakthrough seizures.     CURRENT ANTIEPILEPTIC REGIMEN:  Pt is on keppra, lamictal and lorazepam.    TECHNIQUE: 30 channel video electroencephalogram (EEG) was performed in accordance with the international 10-20 system. The study was reviewed in bipolar and referential montages. The recording examined the patient during wakeful and drowsy/sleep state(s).     DESCRIPTION OF THE RECORD:  During the wakefulness, the background showed a symmetrical 9-10 Hz alpha activity posteriorly with amplitude of 70 mV.  There was reactivity to eye closure/opening.  A normal anterior-posterior gradient was noted with faster beta frequencies seen anteriorly.  During drowsiness, theta/delta frequencies were seen.    During the sleep state, background shows diffuse high-amplitude 4-5 Hz delta activity.  Symmetrical high-amplitude sleep spindles and vertex sharps were seen in the leads over the central regions.     ACTIVATION PROCEDURES:   Hyperventilation was  performed by the patient for a total of 3 minutes. The technician performing the test noted good effort. This technique produced electroencephalographic changes in keeping with the expected bilaterally synchronous, frontally predominant, high amplitude slow waves build up.     Intermittent Photic stimulation was  performed in a stepwise fashion from 1 to 30 Hz and elicited a normal response (photic driving), most noticeable in the posterior leads.      ICTAL AND/OR INTERICTAL FINDINGS:   No focal or generalized epileptiform activity noted. No regional slowing was seen during this routine study.  No clinical events or seizures were reported or recorded during the study.     EKG: sampling of the EKG recording demonstrated sinus rhythm.      INTERPRETATION:  This is a  normal video EEG recording in the awake, drowsy and sleep state(s).  Clinical correlation is recommended.    Note: A normal EEG does not rule out epilepsy.  If the clinical suspicion remains high for seizures, a prolonged recording to capture clinical or subclinical events may be helpful.        Melissa Bloch, MD  Clinical  of Neurology [facility].   Diplomate in Neurology.   Office: [phone]  Fax: [phone]

## 2018-11-30 ENCOUNTER — TELEPHONE (OUTPATIENT)
Dept: NEUROLOGY | Facility: MEDICAL CENTER | Age: 23
End: 2018-11-30

## 2018-11-30 NOTE — TELEPHONE ENCOUNTER
[phone]    Father of pt called stated pt had a seizure on 11/27/18 at 3:30 am that lasted 30 min asking if pt should be seen.    Called back left message to call back.

## 2018-11-30 NOTE — TELEPHONE ENCOUNTER
Dad called states he didn't call an ambulance. Dad states pt has been fine since the episode, but wondering if pt should be seen before his march appt    Mother ([place]) [phone]  to make his appt if needs one

## 2018-12-11 ENCOUNTER — OFFICE VISIT (OUTPATIENT)
Dept: NEUROLOGY | Facility: MEDICAL CENTER | Age: 23
End: 2018-12-11
Payer: COMMERCIAL

## 2018-12-11 VITALS
OXYGEN SATURATION: 96 % | TEMPERATURE: 98.6 F | WEIGHT: 211.6 LBS | HEIGHT: 69 IN | DIASTOLIC BLOOD PRESSURE: 74 MMHG | BODY MASS INDEX: 31.34 KG/M2 | HEART RATE: 84 BPM | SYSTOLIC BLOOD PRESSURE: 126 MMHG

## 2018-12-11 DIAGNOSIS — G40.009 PARTIAL IDIOPATHIC EPILEPSY WITH SEIZURES OF LOCALIZED ONSET, NOT INTRACTABLE, WITHOUT STATUS EPILEPTICUS (HCC): ICD-10-CM

## 2018-12-11 DIAGNOSIS — F84.0 AUTISM SPECTRUM DISORDER: ICD-10-CM

## 2018-12-11 PROCEDURE — 99214 OFFICE O/P EST MOD 30 MIN: CPT | Performed by: PSYCHIATRY & NEUROLOGY

## 2018-12-12 NOTE — ASSESSMENT & PLAN NOTE
Pt had a seizure that started when he was asleep and he was post-ictal for awhile. Pt states that he bite his tongue with the seizure. Pt states that he would like to try CBD and has questions. Pt states that he feels like he was stressed out the last time he has a seizure.

## 2018-12-12 NOTE — PROGRESS NOTES
CHIEF COMPLAINT  Chief Complaint   Patient presents with   • Follow-Up     autism spectrum disorder/seizure       HPI  Braydon Sotelo is a 19 y.o. male who presents for epilepsy evaluation and his autism has been at times problematic as he has difficulty making adjustments.  Epilepsy  Pt had a seizure that started when he was asleep and he was post-ictal for awhile. Pt states that he bite his tongue with the seizure. Pt states that he would like to try CBD and has questions. Pt states that he feels like he was stressed out the last time he has a seizure.    Autism spectrum disorder  Pt states that he is doing better with the psychologists.    REVIEW OF SYSTEMS  Pertinent Positives:insomnia, learning disability, tangential, anxious about seizures, irritable.   All other systems are negative.     PAST MEDICAL HISTORY  Past Medical History:   Diagnosis Date   • Autism    • Autism spectrum disorder    • Seizure (HCC)     since 2013       SOCIAL HISTORY  Social History     Social History   • Marital status: Single     Spouse name: N/A   • Number of children: N/A   • Years of education: N/A     Occupational History   • Not on file.     Social History Main Topics   • Smoking status: Never Smoker   • Smokeless tobacco: Never Used   • Alcohol use No   • Drug use: No   • Sexual activity: Not on file     Other Topics Concern   • Not on file     Social History Narrative   • No narrative on file       SURGICAL HISTORY  Past Surgical History:   Procedure Laterality Date   • OTHER  1/10/2014    wisdom teeth extraction   • TONSILLECTOMY AND ADENOIDECTOMY         CURRENT MEDICATIONS  Current Outpatient Prescriptions   Medication Sig Dispense Refill   • LamoTRIgine 50 MG TABLET DISPERSIBLE DISSOLVE ONE TABLET UNDER THE TONGUE ONCE DAILY 30 Tab 6   • Levetiracetam 750 MG TABLET SR 24 HR TAKE TWO TABLETS BY MOUTH IN THE EVENING AS NEEDED 60 Tab 11   • citalopram (CELEXA) 20 MG Tab Take 1 Tab by mouth every day. 90 Tab 3   •  "lorazepam (ATIVAN) 1 MG Tab Take 1 mg by mouth every four hours as needed for Anxiety.       No current facility-administered medications for this visit.        ALLERGIES  No Known Allergies    PHYSICAL EXAM  VITAL SIGNS: /74 (BP Location: Left arm, Patient Position: Sitting, BP Cuff Size: Adult)   Pulse 84   Temp 37 °C (98.6 °F) (Temporal)   Ht 1.753 m (5' 9\")   Wt 96 kg (211 lb 9.6 oz)   SpO2 96%   BMI 31.25 kg/m²   Constitutional: Well developed, Well nourished, No acute distress, Non-toxic appearance.   HENT: normocephalic, atraumatic   Eyes: perrl   Neck: Normal range of motion, No tenderness, Supple, No stridor.   Cardiovascular: Normal heart rate, Normal rhythm, No murmurs, No rubs, No gallops.   Thorax & Lungs: Normal breath sounds, No respiratory distress, No wheezing, No chest tenderness.   Abdomen: Bowel sounds normal, Soft, No tenderness, No masses, No pulsatile masses.   Skin: Warm, Dry, No erythema, No rash.   Back: No tenderness, No CVA tenderness.   Extremities: Intact distal pulses, No edema, No tenderness, No cyanosis, No clubbing.   Neurologic: A&OX3, CN:2-12 intact, motor: intact, sensory intact, gait intact   Psychiatric: Affect normal, Judgment normal, Mood normal.   Lab: Reviewed with patient in detail and as noted in results.    EEG  Normal per Dr. Corona    RADIOLOGY/PROCEDURES  Mri normal of the brain    ASSESSMENT AND PLAN  1. Autism spectrum disorder   Patient's behavior is still stable at times so will continue the Celexa to 20 mg. Pt is seen at therapist for his explosive anger disorder. Continue with therapist. Refills done today.    2. Epilepsy   Continue 750 mg KeppraXR QHS. Continue Lamictal 100 mg po q am and consider a transition to lamictal if he tolerates it. Ativan prn breakthrough. Refills done today.       I spent 25 minutes with this patient and his father face to face, over fifty percent was spent counseling patient on their condition, best management " practices, reviewing test results and risks and benefits of treatment.

## 2019-03-28 ENCOUNTER — OFFICE VISIT (OUTPATIENT)
Dept: MEDICAL GROUP | Age: 24
End: 2019-03-28
Payer: COMMERCIAL

## 2019-03-28 VITALS
SYSTOLIC BLOOD PRESSURE: 104 MMHG | TEMPERATURE: 98.9 F | WEIGHT: 215.2 LBS | HEIGHT: 69 IN | OXYGEN SATURATION: 94 % | DIASTOLIC BLOOD PRESSURE: 72 MMHG | BODY MASS INDEX: 31.87 KG/M2 | HEART RATE: 99 BPM

## 2019-03-28 DIAGNOSIS — H66.93 OTITIS OF BOTH EARS: ICD-10-CM

## 2019-03-28 PROCEDURE — 99214 OFFICE O/P EST MOD 30 MIN: CPT | Performed by: FAMILY MEDICINE

## 2019-03-28 RX ORDER — AMOXICILLIN 875 MG/1
875 TABLET, COATED ORAL 2 TIMES DAILY
Qty: 14 TAB | Refills: 0 | Status: SHIPPED | OUTPATIENT
Start: 2019-03-28 | End: 2019-04-10

## 2019-03-28 RX ORDER — PREDNISONE 20 MG/1
TABLET ORAL
Qty: 12 TAB | Refills: 0 | Status: SHIPPED | OUTPATIENT
Start: 2019-03-28 | End: 2019-04-10

## 2019-03-28 NOTE — PROGRESS NOTES
This medical record contains text that has been entered with the assistance of computer voice recognition and dictation software.  Therefore, it may contain unintended errors in text, spelling, punctuation, or grammar    Chief Complaint   Patient presents with   • Otalgia   • Pharyngitis         Braydon Sotelo is a 23 y.o. male here evaluation and management of: feeling sick      HPI:     Otitis of both ears  NEW PROBLEM    Patient is a 23-year-old male who presents to clinic with his parents with chief complaint of bilateral ear pain feeling feverish, sore throat for the past 2 days.  He has had a low-grade fever, has been complaining of difficulty sleeping difficulty with appetite.  He denies any neck stiffness, no change in vision no weakness in extremity no abdominal pain no chest pain no nausea, shortness of breath.  He is able to tolerate p.o. fluids.  There are no other sick contacts.  He denies any new rashes no joint pain no weakness in any extremity.  Denies any cough no sneezing no runny nose.    Current medicines (including changes today)  Current Outpatient Prescriptions   Medication Sig Dispense Refill   • amoxicillin (AMOXIL) 875 MG tablet Take 1 Tab by mouth 2 times a day. 14 Tab 0   • ciprofloxacin (CIPRO HC) 0.2-1 % Suspension Place 3 Drops in ear 2 times a day. Administer drops to both ears. 1 Bottle 0   • predniSONE (DELTASONE) 20 MG Tab Take 3 tabs po for 2 days then 2 tabs for 2 days then 1 for 2 days 12 Tab 0   • LamoTRIgine 50 MG TABLET DISPERSIBLE DISSOLVE ONE TABLET UNDER THE TONGUE ONCE DAILY 30 Tab 6   • Levetiracetam 750 MG TABLET SR 24 HR TAKE TWO TABLETS BY MOUTH IN THE EVENING AS NEEDED 60 Tab 11   • citalopram (CELEXA) 20 MG Tab Take 1 Tab by mouth every day. 90 Tab 3   • lorazepam (ATIVAN) 1 MG Tab Take 1 mg by mouth every four hours as needed for Anxiety.       No current facility-administered medications for this visit.      He  has a past medical history of Autism; Autism  "spectrum disorder; and Seizure (HCC).  He  has a past surgical history that includes tonsillectomy and adenoidectomy and other (1/10/2014).  Social History   Substance Use Topics   • Smoking status: Never Smoker   • Smokeless tobacco: Never Used   • Alcohol use No     Social History     Social History Narrative   • No narrative on file     History reviewed. No pertinent family history.  No family status information on file.         ROS    Please see hpi     All other systems reviewed and are negative     Objective:     Blood pressure 104/72, pulse 99, temperature 37.2 °C (98.9 °F), temperature source Temporal, height 1.753 m (5' 9\"), weight 97.6 kg (215 lb 3.2 oz), SpO2 94 %. Body mass index is 31.78 kg/m².  Physical Exam:    Constitutional: Alert, no distress.  Skin: Warm, dry, good turgor, no rashes in visible areas  Eye: Equal, round and reactive, conjunctiva clear, lids normal.  ENMT: Bulging bilateral beefy red tympanic membranes,  Neck: Trachea midline, no masses, no thyromegaly. No cervical or supraclavicular lymphadenopathy.  Respiratory: Unlabored respiratory effort, lungs clear to auscultation, no wheezes, no ronchi.  Cardiovascular: Normal S1, S2, no murmur, no edema  Abdomen: Soft, non-tender, no masses, no hepatosplenomegaly.  Psych: Alert and oriented x3, normal affect and mood.          Assessment and Plan:   The following treatment plan was discussed      1. Otitis of both ears    - amoxicillin (AMOXIL) 875 MG tablet; Take 1 Tab by mouth 2 times a day.  Dispense: 14 Tab; Refill: 0  - ciprofloxacin (CIPRO HC) 0.2-1 % Suspension; Place 3 Drops in ear 2 times a day. Administer drops to both ears.  Dispense: 1 Bottle; Refill: 0  - predniSONE (DELTASONE) 20 MG Tab; Take 3 tabs po for 2 days then 2 tabs for 2 days then 1 for 2 days  Dispense: 12 Tab; Refill: 0            Instructed to Follow up in clinic or ER for worsening symptoms, difficulty breathing, lack of expected recovery, or should new symptoms " or problems arise.    Followup: Return in about 6 months (around 9/28/2019) for Reevaluation.       Once again this medical record contains text that has been entered with the assistance of computer voice recognition and dictation software.  Therefore, it may contain unintended errors in text, spelling, punctuation, or grammar

## 2019-03-28 NOTE — LETTER
March 28, 2019       Patient: Braydon Sotelo   YOB: 1995   Date of Visit: 3/28/2019         To Whom It May Concern:    It is my medical opinion that Braydon Sotelo will need 2 days off work (may return on saturday)    If you have any questions or concerns, please don't hesitate to call [phone]          Sincerely,          Rajat Guzman M.D.  Electronically Signed

## 2019-03-28 NOTE — ASSESSMENT & PLAN NOTE
NEW PROBLEM    Patient is a 23-year-old male who presents to clinic with his parents with chief complaint of bilateral ear pain feeling feverish, sore throat for the past 2 days.  He has had a low-grade fever, has been complaining of difficulty sleeping difficulty with appetite.  He denies any neck stiffness, no change in vision no weakness in extremity no abdominal pain no chest pain no nausea, shortness of breath.  He is able to tolerate p.o. fluids.  There are no other sick contacts.  He denies any new rashes no joint pain no weakness in any extremity.  Denies any cough no sneezing no runny nose.

## 2019-04-03 ENCOUNTER — TELEPHONE (OUTPATIENT)
Dept: NEUROLOGY | Facility: MEDICAL CENTER | Age: 24
End: 2019-04-03

## 2019-04-03 NOTE — TELEPHONE ENCOUNTER
Patient called to report a seizure.      When change was noticed- 1st seizure 03/26 2:30am   2nd seizure on 04/02 at 11:30pm  Frequency- 1 weekly   Type of seizure- Grand Mal-Stiffness, convulsions.Something different wasn't coming out of it, loss of consciousness, eyes rolled back. EMS was called.     Current Medications- Lamotrigine one tab daily. levetiracetam 2 qhs. Amoxicilliam 875mg BID, Prednisone 20mg (last done Tuesday), Ciproflaxacin (today last dose). Ativan taken after sz. 1ml of CBD oil started last Wednesday. 1.6m THC 16.6mol CBD      Any Medication changes?- yes Antibiotics from PCP Date- Started 03/28    Illness/Fever? Yes, ear infection   Menses? No  Stress? No  Sleep Deprivation? No   Driving? No     Caller: [place] (Mom)/[place] (Dad-Call dad first)  Call Back #: [phone] (Dad)   [phone] (Mom)  Braydon [phone] (Braydon)  OK to leave detailed message: Yes for all.

## 2019-04-04 ENCOUNTER — TELEPHONE (OUTPATIENT)
Dept: NEUROLOGY | Facility: MEDICAL CENTER | Age: 24
End: 2019-04-04

## 2019-04-10 ENCOUNTER — OFFICE VISIT (OUTPATIENT)
Dept: NEUROLOGY | Facility: MEDICAL CENTER | Age: 24
End: 2019-04-10
Payer: COMMERCIAL

## 2019-04-10 VITALS
OXYGEN SATURATION: 96 % | HEIGHT: 69 IN | DIASTOLIC BLOOD PRESSURE: 78 MMHG | WEIGHT: 213 LBS | RESPIRATION RATE: 14 BRPM | HEART RATE: 104 BPM | TEMPERATURE: 97.5 F | SYSTOLIC BLOOD PRESSURE: 124 MMHG | BODY MASS INDEX: 31.55 KG/M2

## 2019-04-10 DIAGNOSIS — F41.9 ANXIETY DISORDER, UNSPECIFIED TYPE: ICD-10-CM

## 2019-04-10 DIAGNOSIS — E66.9 OBESITY (BMI 30-39.9): ICD-10-CM

## 2019-04-10 DIAGNOSIS — F84.0 AUTISM SPECTRUM DISORDER: ICD-10-CM

## 2019-04-10 DIAGNOSIS — R56.9 SEIZURE (HCC): ICD-10-CM

## 2019-04-10 DIAGNOSIS — G40.309 GENERALIZED CONVULSIVE EPILEPSY (HCC): ICD-10-CM

## 2019-04-10 DIAGNOSIS — Z13.31 SCREENING FOR DEPRESSION: ICD-10-CM

## 2019-04-10 PROCEDURE — 99215 OFFICE O/P EST HI 40 MIN: CPT | Performed by: PSYCHIATRY & NEUROLOGY

## 2019-04-10 RX ORDER — LEVETIRACETAM 750 MG/1
TABLET, FILM COATED, EXTENDED RELEASE ORAL
Qty: 60 TAB | Refills: 11 | Status: SHIPPED | OUTPATIENT
Start: 2019-04-10 | End: 2019-06-19 | Stop reason: SDUPTHER

## 2019-04-10 RX ORDER — LAMOTRIGINE 100 MG/1
1 TABLET, EXTENDED RELEASE ORAL DAILY
Qty: 60 TAB | Refills: 11 | Status: SHIPPED | OUTPATIENT
Start: 2019-04-10 | End: 2019-06-19

## 2019-04-10 NOTE — PATIENT INSTRUCTIONS
-Keppra ER 1500 mg every bedtime.     -Lamotrogine  mg tabs, start with one tab every morning for two weeks, then increase to two tabs every morning thereafter.     -Have blood work.     -Have EEG at home.     -Sven Cooper.

## 2019-04-11 ENCOUNTER — TELEPHONE (OUTPATIENT)
Dept: NEUROLOGY | Facility: MEDICAL CENTER | Age: 24
End: 2019-04-11

## 2019-04-11 ENCOUNTER — HOSPITAL ENCOUNTER (EMERGENCY)
Facility: MEDICAL CENTER | Age: 24
End: 2019-04-11
Attending: EMERGENCY MEDICINE
Payer: COMMERCIAL

## 2019-04-11 VITALS
HEIGHT: 69 IN | HEART RATE: 94 BPM | DIASTOLIC BLOOD PRESSURE: 79 MMHG | RESPIRATION RATE: 16 BRPM | TEMPERATURE: 97.3 F | OXYGEN SATURATION: 94 % | SYSTOLIC BLOOD PRESSURE: 121 MMHG | WEIGHT: 211.75 LBS | BODY MASS INDEX: 31.36 KG/M2

## 2019-04-11 DIAGNOSIS — R56.9 SEIZURE (HCC): ICD-10-CM

## 2019-04-11 DIAGNOSIS — E55.9 VITAMIN D DEFICIENCY: ICD-10-CM

## 2019-04-11 LAB
25(OH)D3 SERPL-MCNC: 8 NG/ML (ref 30–100)
ALBUMIN SERPL BCP-MCNC: 4.3 G/DL (ref 3.2–4.9)
ALBUMIN/GLOB SERPL: 1.7 G/DL
ALP SERPL-CCNC: 66 U/L (ref 30–99)
ALT SERPL-CCNC: 27 U/L (ref 2–50)
AMPHET UR QL SCN: NEGATIVE
ANION GAP SERPL CALC-SCNC: 10 MMOL/L (ref 0–11.9)
APPEARANCE UR: CLEAR
AST SERPL-CCNC: 21 U/L (ref 12–45)
BACTERIA #/AREA URNS HPF: NEGATIVE /HPF
BARBITURATES UR QL SCN: NEGATIVE
BASOPHILS # BLD AUTO: 0.2 % (ref 0–1.8)
BASOPHILS # BLD: 0.03 K/UL (ref 0–0.12)
BENZODIAZ UR QL SCN: NEGATIVE
BILIRUB SERPL-MCNC: 0.4 MG/DL (ref 0.1–1.5)
BILIRUB UR QL STRIP.AUTO: NEGATIVE
BUN SERPL-MCNC: 14 MG/DL (ref 8–22)
BZE UR QL SCN: NEGATIVE
CALCIUM SERPL-MCNC: 8.6 MG/DL (ref 8.5–10.5)
CANNABINOIDS UR QL SCN: NEGATIVE
CHLORIDE SERPL-SCNC: 109 MMOL/L (ref 96–112)
CO2 SERPL-SCNC: 22 MMOL/L (ref 20–33)
COLOR UR: YELLOW
CREAT SERPL-MCNC: 1.27 MG/DL (ref 0.5–1.4)
EOSINOPHIL # BLD AUTO: 0.04 K/UL (ref 0–0.51)
EOSINOPHIL NFR BLD: 0.3 % (ref 0–6.9)
EPI CELLS #/AREA URNS HPF: NEGATIVE /HPF
ERYTHROCYTE [DISTWIDTH] IN BLOOD BY AUTOMATED COUNT: 38.2 FL (ref 35.9–50)
GLOBULIN SER CALC-MCNC: 2.5 G/DL (ref 1.9–3.5)
GLUCOSE SERPL-MCNC: 113 MG/DL (ref 65–99)
GLUCOSE UR STRIP.AUTO-MCNC: NEGATIVE MG/DL
HCT VFR BLD AUTO: 51.6 % (ref 42–52)
HGB BLD-MCNC: 17.4 G/DL (ref 14–18)
HYALINE CASTS #/AREA URNS LPF: ABNORMAL /LPF
IMM GRANULOCYTES # BLD AUTO: 0.08 K/UL (ref 0–0.11)
IMM GRANULOCYTES NFR BLD AUTO: 0.6 % (ref 0–0.9)
KETONES UR STRIP.AUTO-MCNC: ABNORMAL MG/DL
LEUKOCYTE ESTERASE UR QL STRIP.AUTO: NEGATIVE
LYMPHOCYTES # BLD AUTO: 1.09 K/UL (ref 1–4.8)
LYMPHOCYTES NFR BLD: 7.7 % (ref 22–41)
MAGNESIUM SERPL-MCNC: 2.2 MG/DL (ref 1.5–2.5)
MCH RBC QN AUTO: 29.9 PG (ref 27–33)
MCHC RBC AUTO-ENTMCNC: 33.7 G/DL (ref 33.7–35.3)
MCV RBC AUTO: 88.7 FL (ref 81.4–97.8)
METHADONE UR QL SCN: NEGATIVE
MICRO URNS: ABNORMAL
MONOCYTES # BLD AUTO: 0.84 K/UL (ref 0–0.85)
MONOCYTES NFR BLD AUTO: 5.9 % (ref 0–13.4)
NEUTROPHILS # BLD AUTO: 12.06 K/UL (ref 1.82–7.42)
NEUTROPHILS NFR BLD: 85.3 % (ref 44–72)
NITRITE UR QL STRIP.AUTO: NEGATIVE
NRBC # BLD AUTO: 0 K/UL
NRBC BLD-RTO: 0 /100 WBC
OPIATES UR QL SCN: NEGATIVE
OXYCODONE UR QL SCN: NEGATIVE
PCP UR QL SCN: NEGATIVE
PH UR STRIP.AUTO: 5 [PH]
PLATELET # BLD AUTO: 204 K/UL (ref 164–446)
PMV BLD AUTO: 9.9 FL (ref 9–12.9)
POTASSIUM SERPL-SCNC: 4.3 MMOL/L (ref 3.6–5.5)
PROPOXYPH UR QL SCN: NEGATIVE
PROT SERPL-MCNC: 6.8 G/DL (ref 6–8.2)
PROT UR QL STRIP: 30 MG/DL
RBC # BLD AUTO: 5.82 M/UL (ref 4.7–6.1)
RBC # URNS HPF: ABNORMAL /HPF
RBC UR QL AUTO: ABNORMAL
SODIUM SERPL-SCNC: 141 MMOL/L (ref 135–145)
SP GR UR STRIP.AUTO: 1.02
UROBILINOGEN UR STRIP.AUTO-MCNC: 0.2 MG/DL
WBC # BLD AUTO: 14.1 K/UL (ref 4.8–10.8)
WBC #/AREA URNS HPF: ABNORMAL /HPF

## 2019-04-11 PROCEDURE — 80307 DRUG TEST PRSMV CHEM ANLYZR: CPT

## 2019-04-11 PROCEDURE — 96374 THER/PROPH/DIAG INJ IV PUSH: CPT

## 2019-04-11 PROCEDURE — 80053 COMPREHEN METABOLIC PANEL: CPT

## 2019-04-11 PROCEDURE — 700105 HCHG RX REV CODE 258: Performed by: EMERGENCY MEDICINE

## 2019-04-11 PROCEDURE — 81001 URINALYSIS AUTO W/SCOPE: CPT | Mod: XU

## 2019-04-11 PROCEDURE — 36415 COLL VENOUS BLD VENIPUNCTURE: CPT

## 2019-04-11 PROCEDURE — 83735 ASSAY OF MAGNESIUM: CPT

## 2019-04-11 PROCEDURE — 80175 DRUG SCREEN QUAN LAMOTRIGINE: CPT

## 2019-04-11 PROCEDURE — 82306 VITAMIN D 25 HYDROXY: CPT

## 2019-04-11 PROCEDURE — 700111 HCHG RX REV CODE 636 W/ 250 OVERRIDE (IP): Performed by: EMERGENCY MEDICINE

## 2019-04-11 PROCEDURE — 99285 EMERGENCY DEPT VISIT HI MDM: CPT

## 2019-04-11 PROCEDURE — 85025 COMPLETE CBC W/AUTO DIFF WBC: CPT

## 2019-04-11 RX ORDER — LORAZEPAM 2 MG/ML
1 INJECTION INTRAMUSCULAR ONCE
Status: COMPLETED | OUTPATIENT
Start: 2019-04-11 | End: 2019-04-11

## 2019-04-11 RX ORDER — SODIUM CHLORIDE, SODIUM LACTATE, POTASSIUM CHLORIDE, CALCIUM CHLORIDE 600; 310; 30; 20 MG/100ML; MG/100ML; MG/100ML; MG/100ML
1000 INJECTION, SOLUTION INTRAVENOUS ONCE
Status: COMPLETED | OUTPATIENT
Start: 2019-04-11 | End: 2019-04-11

## 2019-04-11 RX ORDER — CLONAZEPAM 1 MG/1
1 TABLET ORAL
Qty: 30 TAB | Refills: 5 | Status: SHIPPED | OUTPATIENT
Start: 2019-04-11 | End: 2019-06-19 | Stop reason: SDUPTHER

## 2019-04-11 RX ORDER — ERGOCALCIFEROL 1.25 MG/1
50000 CAPSULE ORAL
Qty: 4 CAP | Refills: 5 | Status: SHIPPED
Start: 2019-04-11 | End: 2020-01-06

## 2019-04-11 RX ORDER — CLONAZEPAM 1 MG/1
1 TABLET ORAL
Qty: 14 TAB | Refills: 0 | Status: SHIPPED | OUTPATIENT
Start: 2019-04-11 | End: 2019-04-11 | Stop reason: SDUPTHER

## 2019-04-11 RX ADMIN — LORAZEPAM 1 MG: 2 INJECTION INTRAMUSCULAR; INTRAVENOUS at 11:02

## 2019-04-11 RX ADMIN — SODIUM CHLORIDE, POTASSIUM CHLORIDE, SODIUM LACTATE AND CALCIUM CHLORIDE 1000 ML: 600; 310; 30; 20 INJECTION, SOLUTION INTRAVENOUS at 09:58

## 2019-04-11 ASSESSMENT — LIFESTYLE VARIABLES: DO YOU DRINK ALCOHOL: NO

## 2019-04-11 NOTE — ED NOTES
Bedside report received from Vee HERNANDEZ, assumed care of patient. Updated whiteboard and introduced myself to patient and family member. Call light in place, no needs at this time.

## 2019-04-11 NOTE — ED NOTES
Discharge instructions and prescriptions discussed with patient and family, patient verbalizes understanding.

## 2019-04-11 NOTE — TELEPHONE ENCOUNTER
Per Dr. Cooper called in new rx:    Klonopin 1 mg tab    #30 with 5 refills for a 30 day supply    Sig: Take 1 tab by mouth every bedtime.       [facility] - [place] - [address] [phone] (Phone)  [phone] (Fax)     [facility] 04/11/19 at 3:43pm

## 2019-04-11 NOTE — ED NOTES
Pt had Dr loera with neurologist yesterday with change in his medications, first dose after seizure.

## 2019-04-11 NOTE — TELEPHONE ENCOUNTER
Mother stated pt was just discharged from ER for seizures. She is was told by doctor if pt has more than two seizures at home to rerun to ER. Mom asking if she does in deed need to bring pt back if that happens? Wants Dr Cooper advice.

## 2019-04-11 NOTE — CONSULTS
Braydon Sotelo is a 24 y/o male w/ hx of sz disorder an autism followed by Dr. Cooper who presented w/ 3 szs. Currently back to baseline. Bit his tongue during events.     I spoke to Dr. Cooper. Plan is to give the patient Ativan 1mg IV now, and a script for Klonopin 1mg QHS and continue his planned medication taper. Patient is to call clinic w/ further sz activity. He has an appt for an EEG as well as clinic f/u w/ Dr. Cooper.

## 2019-04-11 NOTE — ED PROVIDER NOTES
"ED Provider Note    CHIEF COMPLAINT  Chief Complaint   Patient presents with   • Seizure       HPI  Braydon Sotelo is a 23 y.o. male who presents to the emergency department after having 3 separate seizures.  Has a known seizure disorder.  His seizures have not been well controlled.  He was in fact seen by his neurologist, Dr. Cooper, yesterday.  ER lamotrigine was added to the patient's medications.  There is not been any changes in his state of health, but this morning he had a short duration seizure lasting a few minutes with a postictal period lasting for about 5-10 minutes.  He had a seizure at 144 this morning 520 this morning and then again at 830 with only briefly regaining normal mental status.  He complains of pain over his tongue, but no other injury.  He has not use drugs.  He is not drinking alcohol.    REVIEW OF SYSTEMS  As per HPI, otherwise a 10 point review of systems is negative    PAST MEDICAL HISTORY  Past Medical History:   Diagnosis Date   • Autism    • Autism spectrum disorder    • Seizure (HCC)     since 2013       SOCIAL HISTORY  Social History   Substance Use Topics   • Smoking status: Never Smoker   • Smokeless tobacco: Never Used   • Alcohol use No       SURGICAL HISTORY  Past Surgical History:   Procedure Laterality Date   • OTHER  1/10/2014    wisdom teeth extraction   • TONSILLECTOMY AND ADENOIDECTOMY         CURRENT MEDICATIONS  Home Medications    **Home medications have not yet been reviewed for this encounter**         ALLERGIES  No Known Allergies    PHYSICAL EXAM  VITAL SIGNS: /79   Pulse (!) 113   Temp 36.3 °C (97.3 °F) (Temporal)   Resp 16   Ht 1.753 m (5' 9\")   Wt 96.1 kg (211 lb 12 oz)   SpO2 97%   BMI 31.27 kg/m²    Constitutional: Awake and alert.  Tired appearing  HENT:  Atraumatic, Normocephalic.Oropharynx dry mucus membranes, minor bite on the tongue, nose normal inspection.   Eyes: Normal inspection  Neck: Supple  Cardiovascular: Tachycardic heart rate, " Normal rhythm.  Symmetric peripheral pulses.   Thorax & Lungs: No respiratory distress, No wheezing, No rales, No rhonchi, No chest tenderness.   Abdomen: Bowel sounds normal, soft, non-distended, nontender, no mass  Skin: Warm, Dry, No rash.   Back: No tenderness, No CVA tenderness.   Extremities: No clubbing, cyanosis, edema, no Homans or cords   Neurologic: He is tired but will wake up and respond.  Moves all extreme is symmetrically.  Good strength.  No sensory loss.  Cranial nerves intact.        Labs:  Results for orders placed or performed during the hospital encounter of 04/11/19   CBC WITH DIFFERENTIAL   Result Value Ref Range    WBC 14.1 (H) 4.8 - 10.8 K/uL    RBC 5.82 4.70 - 6.10 M/uL    Hemoglobin 17.4 14.0 - 18.0 g/dL    Hematocrit 51.6 42.0 - 52.0 %    MCV 88.7 81.4 - 97.8 fL    MCH 29.9 27.0 - 33.0 pg    MCHC 33.7 33.7 - 35.3 g/dL    RDW 38.2 35.9 - 50.0 fL    Platelet Count 204 164 - 446 K/uL    MPV 9.9 9.0 - 12.9 fL    Neutrophils-Polys 85.30 (H) 44.00 - 72.00 %    Lymphocytes 7.70 (L) 22.00 - 41.00 %    Monocytes 5.90 0.00 - 13.40 %    Eosinophils 0.30 0.00 - 6.90 %    Basophils 0.20 0.00 - 1.80 %    Immature Granulocytes 0.60 0.00 - 0.90 %    Nucleated RBC 0.00 /100 WBC    Neutrophils (Absolute) 12.06 (H) 1.82 - 7.42 K/uL    Lymphs (Absolute) 1.09 1.00 - 4.80 K/uL    Monos (Absolute) 0.84 0.00 - 0.85 K/uL    Eos (Absolute) 0.04 0.00 - 0.51 K/uL    Baso (Absolute) 0.03 0.00 - 0.12 K/uL    Immature Granulocytes (abs) 0.08 0.00 - 0.11 K/uL    NRBC (Absolute) 0.00 K/uL   COMP METABOLIC PANEL   Result Value Ref Range    Sodium 141 135 - 145 mmol/L    Potassium 4.3 3.6 - 5.5 mmol/L    Chloride 109 96 - 112 mmol/L    Co2 22 20 - 33 mmol/L    Anion Gap 10.0 0.0 - 11.9    Glucose 113 (H) 65 - 99 mg/dL    Bun 14 8 - 22 mg/dL    Creatinine 1.27 0.50 - 1.40 mg/dL    Calcium 8.6 8.5 - 10.5 mg/dL    AST(SGOT) 21 12 - 45 U/L    ALT(SGPT) 27 2 - 50 U/L    Alkaline Phosphatase 66 30 - 99 U/L    Total Bilirubin 0.4  0.1 - 1.5 mg/dL    Albumin 4.3 3.2 - 4.9 g/dL    Total Protein 6.8 6.0 - 8.2 g/dL    Globulin 2.5 1.9 - 3.5 g/dL    A-G Ratio 1.7 g/dL   MAGNESIUM   Result Value Ref Range    Magnesium 2.2 1.5 - 2.5 mg/dL   URINE DRUG SCREEN   Result Value Ref Range    Amphetamines Urine Negative Negative    Barbiturates Negative Negative    Benzodiazepines Negative Negative    Cocaine Metabolite Negative Negative    Methadone Negative Negative    Opiates Negative Negative    Oxycodone Negative Negative    Phencyclidine -Pcp Negative Negative    Propoxyphene Negative Negative    Cannabinoid Metab Negative Negative   VITAMIN D,25 HYDROXY   Result Value Ref Range    25-Hydroxy   Vitamin D 25 8 (L) 30 - 100 ng/mL   ESTIMATED GFR   Result Value Ref Range    GFR If African American >60 >60 mL/min/1.73 m 2    GFR If Non African American >60 >60 mL/min/1.73 m 2   URINALYSIS,CULTURE IF INDICATED   Result Value Ref Range    Color Yellow     Character Clear     Specific Gravity 1.023 <1.035    Ph 5.0 5.0 - 8.0    Glucose Negative Negative mg/dL    Ketones Trace (A) Negative mg/dL    Protein 30 (A) Negative mg/dL    Bilirubin Negative Negative    Urobilinogen, Urine 0.2 Negative    Nitrite Negative Negative    Leukocyte Esterase Negative Negative    Occult Blood Trace (A) Negative    Micro Urine Req Microscopic    URINE MICROSCOPIC (W/UA)   Result Value Ref Range    WBC 0-2 (A) /hpf    RBC 0-2 (A) /hpf    Bacteria Negative None /hpf    Epithelial Cells Negative /hpf    Hyaline Cast 3-5 (A) /lpf       Medications   lactated ringers infusion (BOLUS) (0 mL Intravenous Stopped 4/11/19 1058)   LORazepam (ATIVAN) injection 1 mg (1 mg Intravenous Given 4/11/19 1102)       HYDRATION: Based on the patient's presentation of Tachycardia the patient was given IV fluids. IV Hydration was used because oral hydration was not adequate alone. Upon recheck following hydration, the patient was Improved.    COURSE & MEDICAL DECISION MAKING  Patient presents after  having 3 separate short duration seizures.  Just evaluated by neurology yesterday.  He was tachycardic.  He had findings consistent with seizure on arrival.  He is given IV fluids because of his tachycardia.  His parents had given him Ativan early this morning.  I consulted neurology.  Discussed with Dr. Allen.  He advised Ativan 1 mg IV.  This was administered Dr. Allen spoke with Dr. Cooper the patient's primary neurologist.  He advised initiation of Klonopin 1 mg orally at night prior to bed.  He stated the patient did not need to be admitted to the hospital.  They will continue with plan for outpatient EEG.  Family should call neurology if patient has additional seizure activity.  Continue with current seizure plan.  Patient was observed in the ER for over 4 hours without recurrent seizure activity and he is back to normal mental status.  I discussed the plan with family.  Of advised that the patient be brought back to the ER for recurrent repeated seizures, or concerning medical symptoms.      FINAL IMPRESSION  1.  Breakthrough seizure      This dictation was created using voice recognition software. The accuracy of the dictation is limited to the abilities of the software.  The nursing notes were reviewed and certain aspects of this information were incorporated into this note.      Electronically signed by: Anuj Tan, 4/11/2019 10:03 AM

## 2019-04-11 NOTE — ED NOTES
Pt BI EMS from home.  Pt has h/o seizures and takes Keppra.  Per report pt has had 3 seizures today with last at 0820.  Pt was given ativan by parents after first seizure.  Pt was incont of urine and reports pain to tongue and head.  Pt was postictal on EMS arrival.  Pt A/O x4 on arrival to ER.  Pt 86% on RA.  Pt  for EMS.  Pt also has h/o Autism.  ERP to see.

## 2019-04-11 NOTE — PROGRESS NOTES
"Chief Complaint   Patient presents with   • Follow-Up     Partial idiopathic epilepsy with seizures of localized onset, not intractable, without status epilepticus        Problem List Items Addressed This Visit     Autism spectrum disorder    Relevant Orders    Nutrition (Dietary) Consult    Anxiety disorder    Obesity (BMI 30-39.9)    Relevant Orders    Patient identified as having weight management issue.  Appropriate orders and counseling given.    Nutrition (Dietary) Consult      Other Visit Diagnoses     Generalized convulsive epilepsy (HCC)        Relevant Medications    LamoTRIgine 100 MG TABLET SR 24 HR    Levetiracetam 750 MG TABLET SR 24 HR    Other Relevant Orders    REFERRAL TO NEURODIAGNOSTICS (EEG,EP,EMG/NCS/DBS) Modality Requested: Ambulatory EEG (48 hrs amb eeg)    CBC WITH DIFFERENTIAL    Comp Metabolic Panel    LAMOTRIGINE    LEVETIRACETAM (KEPPRA), S    VITAMIN D,25 HYDROXY    Screening for depression        Seizure (HCC)        Relevant Medications    LamoTRIgine 100 MG TABLET SR 24 HR    Levetiracetam 750 MG TABLET SR 24 HR          History of present illness:  Braydon Sotelo 23 y.o. male presents today with his mother to establish care for epilepsy. He had a normal birth. At age 13 yo, he had his first seizure, and these have continued since. The longest he has been without a seizure is six months. He saw Dr. Vázquez first and then established with Dr. Bloch, who is no longer in the practice so they were transferred under my care. His first medication was keppra and then a low dose of Lamictal was added. He does not drive. He only describes GTC seizures. No clear warning. They used to have day or night but over the last couple of years it appears they only happen on his sleep. There is no family h/o epilepsy, head injury or any other neurological condition except that he was diagnosed with \"high function autism, probably Asperger's\".     No headaches.     Mother states he is sarkar " often, gets angry and sometimes sad. He also suffers from anxiety. He is not suicidal or homicidal.     He lives at home with his family, and they are supportive.     He is compliant with his medications.     Past surgical history:   Past Surgical History:   Procedure Laterality Date   • OTHER  1/10/2014    wisdom teeth extraction   • TONSILLECTOMY AND ADENOIDECTOMY         Family history:   History reviewed. No pertinent family history.    Social history:   Social History     Social History   • Marital status: Single     Spouse name: N/A   • Number of children: N/A   • Years of education: N/A     Occupational History   • Not on file.     Social History Main Topics   • Smoking status: Never Smoker   • Smokeless tobacco: Never Used   • Alcohol use No   • Drug use: No   • Sexual activity: Not on file     Other Topics Concern   • Not on file     Social History Narrative   • No narrative on file       Current medications:   Current Outpatient Prescriptions   Medication   • LamoTRIgine 100 MG TABLET SR 24 HR   • Levetiracetam 750 MG TABLET SR 24 HR   • citalopram (CELEXA) 20 MG Tab   • lorazepam (ATIVAN) 1 MG Tab     No current facility-administered medications for this visit.        Medication Allergy:  No Known Allergies      Review of systems:   Constitutional: denies fever, night sweats, weight loss, or malaise/fatigue.   Eyes: denies acute vision change, eye pain or secretion.   Ears, Nose, Mouth, Throat: denies nasal secretion, nasal bleeding, difficulty swallowing, hearing loss, tinnitus, vertigo, ear pain, oral ulcers or lesions.   Endocrine: denies recent weight changes, heat or cold intolerance, polyuria, polydypsia, polyphagia,abnormal hair growth.  Cardiovascular: denies new onset of chest pain, palpitations, syncope, lower extremity edema, or dyspnea of exertion.  Pulmonary: denies shortness of breath, new onset of cough, hemoptysis, wheezing, chest pain or flu-like symptoms.   GI: denies nausea, vomiting,  "diarrhea, GI bleeding, change in appetite, abdominal pain, and change in bowel habits.  : denies urinary incontinence, retention or hematuria.  Heme/oncology: denies history of easy bruising or bleeding. No history of cancer. Allergy/immunology: denies hives/urticarial.  Dermatologic: denies new rash.  Musculoskeletal:denies joint swelling or pain, muscle pain, neck and back pain.   Neurologic: denies headaches, acute visual changes, facial droopiness, muscle weakness (focal or generalized), paresthesias, anesthesia, ataxia, change in speech or language, memory loss.  Psychiatric: denies symptoms of hallucinations, suicidal or homicidal thoughts.     Physical examination:   Vitals:    04/10/19 1256   BP: 124/78   BP Location: Left arm   Patient Position: Sitting   BP Cuff Size: Adult   Pulse: (!) 104   Resp: 14   Temp: 36.4 °C (97.5 °F)   TempSrc: Temporal   SpO2: 96%   Weight: 96.6 kg (213 lb)   Height: 1.753 m (5' 9\")     General: Patient in no acute distress, pleasant and cooperative. Overweight.   HEENT: Normocephalic, no signs of acute trauma.   Neck: supple, no meningeal signs or carotid bruits. There is normal range of motion. No tenderness on exam.   Chest: clear to auscultation. No cough.   CV: RRR, no murmurs.   Skin: no signs of acute rashes or trauma.   Musculoskeletal: joints exhibit full range of motion, without any pain to palpation. There are no signs of joint or muscle swelling. There is no tenderness to deep palpation of muscles.   Psychiatric: No hallucinatory behavior. Denies symptoms of depression or suicidal ideation. Mood and affect appear normal on exam.     NEUROLOGICAL EXAM:   Mental status, orientation: Awake, alert and fully oriented.   Speech and language: speech is clear and fluent. The patient is able to name, repeat and comprehend.   Memory: There is intact recollection of recent and remote events.   Cranial nerve exam: Pupils are 3-4 mm bilaterally and equally reactive to light and " accommodation. Visual fields are intact by confrontation. There is no nystagmus on primary or secondary gaze. Intact full EOM in all directions of gaze. Face appears symmetric. Sensation in the face is intact to light touch. Uvula is midline. Palate elevates symmetrically. Tongue is midline and without any signs of tongue biting or fasciculations.Sternocleidomastoid muscles exhibit is normal strength bilaterally. Shoulder shrug is intact bilaterally.   Motor exam: Strength is 5/5 in all extremities. Tone is normal. No abnormal movements were seen on exam.   Sensory exam reveals normal sense of light touch in all extremities.   Deep tendon reflexes:  2+ throughout. Plantar responses are flexor. There is no clonus.   Coordination: shows a normal finger-nose-finger. Normal rapidly alternating movements.   Gait: The patient was able to get up from seated position on first attempt without requiring assistance. Found to be steady when walking. Movements were fluid with normal arm swing. The patient was able to turn without difficulties or tendency to fall. Romberg exam shows mild swaying.         ANCILLARY DATA REVIEWED:       Lab Data Review:  No new labs available, old labs reviewed in chart.     Records reviewed:   Chart reviewed, including notes from Dr. Bloch.     Imaging:   MRI brain wo, 11/23/13:  Normal.    EEG:  VEEG, 10/11/18:  Normal.           ASSESSMENT AND PLAN:    1. Generalized convulsive epilepsy (HCC)  - REFERRAL TO NEURODIAGNOSTICS (EEG,EP,EMG/NCS/DBS) Modality Requested: Ambulatory EEG (48 hrs amb eeg)  - LamoTRIgine 100 MG TABLET SR 24 HR; Take 1 Tab by mouth every day. One tab po daily for two weeks, then increase to two tabs po daily.  Dispense: 60 Tab; Refill: 11  - CBC WITH DIFFERENTIAL; Future  - Comp Metabolic Panel; Future  - LAMOTRIGINE; Future  - LEVETIRACETAM (KEPPRA), S  - VITAMIN D,25 HYDROXY; Future    2. Autism spectrum disorder  - Nutrition (Dietary) Consult    3. Obesity (BMI  30-39.9)  - Patient identified as having weight management issue.  Appropriate orders and counseling given.  - Nutrition (Dietary) Consult    4. Anxiety disorder, unspecified type    5. Screening for depression    6. Seizure (HCC)  - Levetiracetam 750 MG TABLET SR 24 HR; TAKE TWO TABLETS BY MOUTH IN THE EVENING  Dispense: 60 Tab; Refill: 11      CLINICAL DISCUSSION:   Uncontrolled epilepsy, generalized convulsions, no clear auras, seizures now nocturnal. Longest without a seizure was six months in the past, has weekly spells now. Compliant with meds. Some mood issues reported by mother, probably Keppra not right med for him. Does not need ODT of Lamictal, was on very low dose. We will increased Lamictal and change to ER formulation, start with Lamictal  mg po daily in am and then increase to 200 mg daily in am after two weeks. Continue with Keppra ER 1500 mg qhs for now. Will have 48 hrs amb eeg in attempt to capture spell. Recent veeg was normal. Needs blood work, including AED levels. Can increase lamictal further in future if tolerating, may be able to control on monotherapy. His spells need further characterization. If amb eeg non contributory, will need EMU.   Remain on no driving for now.   Discussed diet and exercise.       FOLLOW-UP:   Return in about 3 months (around 7/10/2019).      EDUCATION AND COUNSELING:  -Education was provided to the patient and/or family regarding diagnosis and prognosis. The chronic and unpredictable nature of the condition were discussed. There is increased risk for additional events, which may carry potential for significant injuries and death. Discussed frequent seizure triggers: sleep deprivation, medication non-compliance, use of illegal drugs/alcohol, stress, and others.   -We reviewed in detail the current antiepileptic regimen. Potential side effects of antiepileptics were discussed at length, including but no limited to: hypersensitivity reactions (rash and others,  some of which can be fatal), visual field changes (some of which may be irreversible), glaucoma, diplopia, kidney stones, osteopenia/osteoporosis/bone fractures, hyperthermia/anhydrosis, hyponatremia, tremors/abnormal movements, ataxia, dizziness, fatigue, increased risk for falls, risk for cardiac arrhythmias/syncope, gastrointestinal side effects(hepatitis, pancreatitis, gastritis, ulcers), gingival hypertrophy/bleeding, drowsiness, sedation, anxiety/nervousness, increased risk for suicide, increased risk for depression, and psychosis.   -We also reviewed drug-drug interactions and their potential effect on seizure control and medication side effects.    -Recommend chronic vitamin D supplementation and regular exercise (if not contraindicated).   -Patient/family educated on risk for SUDEP (Sudden Death in Epilepsy). Counseling was provided on the importance of strict medication and follow up compliance. The patient/family understand the risks associated with non-adherence with the medical plan as outlined, including but not limited to an increased risk for breakthrough seizures, which may contribute to injuries, disability, status epilepticus, and even death.   -Counseling was also provided on potential effects of alcohol and other drugs, which may lower seizure threshold and/or affect the metabolism of antiepileptic drugs. We recommend avoidance of alcohol and illegal drugs.  -Avoid sleep deprivation.   -We extensively discussed the aspects related to safety in drivers who suffer from epilepsy. The patient is encourage to report to the Division of Motor Vehicles of any condition and/or spells related to confusion, disorientation, and/or loss of awareness and/or loss of consciousness; as these may pose a safety issue if they occur while operating a motor vehicle. The patient and/or family are ultimately responsible for exercising caution and abiding to regulations in place.   -Other seizure precautions were  discussed at length, including no diving, no skydiving, no climbing or exposure to unprotected heights, no unsupervised swimming, no Jacuzzi or bathing in bathtubs or deep bodies of water. The patient/family have been advised about risks for operating any machinery while suffering from seizures / syncope / epilepsy and/or while taking antiepileptic drugs.   -The patient understands and agrees that due to the complexity of his/her diagnosis, results of any testing and further recommendations will typically be discussed/made during a face to face encounter in my office. The patient and/or family further understands it is their responsibility to keep proper follow up.     Patient/family agree with plan, as outlined.         Mando Cooper MD   Epilepsy and Neurodiagnostics.   Clinical  of Neurology [facility].   Diplomate in Neurology, Epilepsy, and Electrodiagnostic Medicine.   Office: [phone]  Fax: [phone]      BILLING DOCUMENTATION:     Counseling:  I spent greater than 50% time face-to-face time of a total of 65 mins visit. Over 50% of the time of the visit today was spent on counseling and or coordination of care wtih the patient/family, with greater than 50% of the total time discussing:   o Diagnostic results, impressions, and/or recommended diagnostic studies, and coordination of care.   o Prognosis.  o Treatment recommendations, including risks, benefits, & alternatives.  o Instructions for treatment/management and/or follow-up.  o Importance of compliance with chosen treatment/management options.  o Risk factor modification.   o Patient & family education.  o Provided business card and/or instructions for follow-up & emergencies.

## 2019-04-13 LAB — LAMOTRIGINE SERPL-MCNC: <0.9 UG/ML (ref 2.5–15)

## 2019-04-18 ENCOUNTER — TELEPHONE (OUTPATIENT)
Dept: NEUROLOGY | Facility: MEDICAL CENTER | Age: 24
End: 2019-04-18

## 2019-04-18 NOTE — TELEPHONE ENCOUNTER
Father stated pt only had 4 tabs of klonopin left and requested a refill. I stated pt was in last week with his mother and Dr Cooper refilled it and handed the script to mom to take to the pharmacy to fill. Father seemed confused and I stated he needed to talk to mom and find out which pharmacy she dropped it off at or if she forgot to take it to the pharmacy. Father verbally understood and will call back if he has anymore questions.

## 2019-05-21 ENCOUNTER — TELEPHONE (OUTPATIENT)
Dept: MEDICAL GROUP | Age: 24
End: 2019-05-21

## 2019-05-21 DIAGNOSIS — E66.9 OBESITY (BMI 30-39.9): Primary | ICD-10-CM

## 2019-05-21 NOTE — TELEPHONE ENCOUNTER
1. Caller Name: Braydon Sotelo ( Mother)      Call Back Number: [phone] (home)         Patient approves a detailed voicemail message: yes    2. SPECIFIC Action To Be Taken: Referral pending, please sign.    3. Diagnosis/Clinical Reason for Request: Obesity    4. Specialty & Provider Name/Lab/Imaging Location: [facility]    5. Is appointment scheduled for requested order/referral: no    Patient was informed they will receive a return phone call from the office ONLY if there are any questions before processing their request. Advised to call back if they haven't received a call from the referral department in 5 days.

## 2019-05-28 ENCOUNTER — NON-PROVIDER VISIT (OUTPATIENT)
Dept: NEUROLOGY | Facility: MEDICAL CENTER | Age: 24
End: 2019-05-28
Payer: COMMERCIAL

## 2019-05-28 DIAGNOSIS — G40.309 NONINTRACTABLE GENERALIZED IDIOPATHIC EPILEPSY WITHOUT STATUS EPILEPTICUS (HCC): Primary | ICD-10-CM

## 2019-05-28 PROCEDURE — 95953 EEG: CPT | Performed by: PSYCHIATRY & NEUROLOGY

## 2019-05-28 NOTE — LETTER
May 28, 2019         Patient: Braydon Sotelo   YOB: 1995   Date of Visit: 5/28/2019           To Whom it May Concern:    Braydon Sotelo was seen in my clinic on 5/29/2019. He may return to work on 5/30/2019.    If you have any questions or concerns, please don't hesitate to call.        Sincerely,           NEURODIAGNOSTIC LAB [facility] [place]  Electronically Signed

## 2019-05-29 ENCOUNTER — NON-PROVIDER VISIT (OUTPATIENT)
Dept: NEUROLOGY | Facility: MEDICAL CENTER | Age: 24
End: 2019-05-29
Payer: COMMERCIAL

## 2019-05-29 DIAGNOSIS — G40.309 GENERALIZED CONVULSIVE EPILEPSY (HCC): ICD-10-CM

## 2019-05-29 PROCEDURE — 95953 EEG: CPT | Performed by: PSYCHIATRY & NEUROLOGY

## 2019-05-29 NOTE — PROCEDURES
48 HR AMBULATORY ELECTROENCEPHALOGRAM REPORT        DOS:   5/28/2019 (total recording for 21 hours and 37 minutes), and 5/29/2019 (total recording for 24 hours and 44 minutes).      INDICATION:  Braydon Sotelo 23 y.o. male presenting with seizures.      CURRENT ANTIEPILEPTIC REGIMEN: Lamotrigine 200 mg daily.      TECHNIQUE: A 30-channel, 48 hrs ambulatory electroencephalogram (aEEG) was performed in accordance with the international 10-20 system. This digital study was reviewed in bipolar and referential montages. The recording examined the patient during wakeful, drowsy and sleep states.      DESCRIPTION OF THE RECORD:  During the awake state, background shows symmetrical 12 Hz alpha activity posteriorly with amplitude of 70 mV.  There was reactivity with eye opening/closure.  Normal anterior-posterior gradient was noted with faster beta frequencies seen anteriorly.  During drowsiness, high-amplitude delta frequencies were seen.     During the sleep state, background shows diffuse high-amplitude 4-5 Hz delta activity.  Symmetrical high-amplitude sleep spindles and vertex sharp activities were seen in the central leads.     ACTIVATION PROCEDURES:   Not performed.      ICTAL AND/OR INTERICTAL FINDINGS:   No focal or generalized epileptiform activity was noted. No regional slowing was seen during this study.  No seizures were recorded during the study.      EVENT(S):  None.      EKG: sampling review of EKG recording demonstrated sinus rhythm.         INTERPRETATION:   This is a normal 48 hours ambulatory electroencephalogram recording in the awake, drowsy and sleep state.  No events were captured during the study. Clinical correlation is recommended.     Note: A normal electroencephalogram does not rule out epilepsy.            Mando Cooper MD   Epilepsy and Neurodiagnostics.   Clinical  of Neurology [facility].   Diplomate in Neurology,  Epilepsy, and Electrodiagnostic Medicine.   Office: [phone]  Fax: [phone]

## 2019-05-30 ENCOUNTER — NON-PROVIDER VISIT (OUTPATIENT)
Dept: NEUROLOGY | Facility: MEDICAL CENTER | Age: 24
End: 2019-05-30
Payer: COMMERCIAL

## 2019-06-13 ENCOUNTER — HOSPITAL ENCOUNTER (OUTPATIENT)
Dept: LAB | Facility: MEDICAL CENTER | Age: 24
End: 2019-06-13
Attending: PSYCHIATRY & NEUROLOGY
Payer: COMMERCIAL

## 2019-06-13 DIAGNOSIS — G40.309 GENERALIZED CONVULSIVE EPILEPSY (HCC): ICD-10-CM

## 2019-06-13 LAB
25(OH)D3 SERPL-MCNC: 52 NG/ML (ref 30–100)
ALBUMIN SERPL BCP-MCNC: 4.4 G/DL (ref 3.2–4.9)
ALBUMIN/GLOB SERPL: 1.8 G/DL
ALP SERPL-CCNC: 74 U/L (ref 30–99)
ALT SERPL-CCNC: 28 U/L (ref 2–50)
ANION GAP SERPL CALC-SCNC: 8 MMOL/L (ref 0–11.9)
AST SERPL-CCNC: 17 U/L (ref 12–45)
BASOPHILS # BLD AUTO: 1 % (ref 0–1.8)
BASOPHILS # BLD: 0.06 K/UL (ref 0–0.12)
BILIRUB SERPL-MCNC: 0.3 MG/DL (ref 0.1–1.5)
BUN SERPL-MCNC: 18 MG/DL (ref 8–22)
CALCIUM SERPL-MCNC: 9.2 MG/DL (ref 8.5–10.5)
CHLORIDE SERPL-SCNC: 108 MMOL/L (ref 96–112)
CO2 SERPL-SCNC: 24 MMOL/L (ref 20–33)
CREAT SERPL-MCNC: 1.28 MG/DL (ref 0.5–1.4)
EOSINOPHIL # BLD AUTO: 0.16 K/UL (ref 0–0.51)
EOSINOPHIL NFR BLD: 2.6 % (ref 0–6.9)
ERYTHROCYTE [DISTWIDTH] IN BLOOD BY AUTOMATED COUNT: 37.8 FL (ref 35.9–50)
FASTING STATUS PATIENT QL REPORTED: NORMAL
GLOBULIN SER CALC-MCNC: 2.4 G/DL (ref 1.9–3.5)
GLUCOSE SERPL-MCNC: 102 MG/DL (ref 65–99)
HCT VFR BLD AUTO: 50.4 % (ref 42–52)
HGB BLD-MCNC: 17.2 G/DL (ref 14–18)
IMM GRANULOCYTES # BLD AUTO: 0.01 K/UL (ref 0–0.11)
IMM GRANULOCYTES NFR BLD AUTO: 0.2 % (ref 0–0.9)
LYMPHOCYTES # BLD AUTO: 2.17 K/UL (ref 1–4.8)
LYMPHOCYTES NFR BLD: 35.5 % (ref 22–41)
MCH RBC QN AUTO: 30.3 PG (ref 27–33)
MCHC RBC AUTO-ENTMCNC: 34.1 G/DL (ref 33.7–35.3)
MCV RBC AUTO: 88.9 FL (ref 81.4–97.8)
MONOCYTES # BLD AUTO: 0.49 K/UL (ref 0–0.85)
MONOCYTES NFR BLD AUTO: 8 % (ref 0–13.4)
NEUTROPHILS # BLD AUTO: 3.23 K/UL (ref 1.82–7.42)
NEUTROPHILS NFR BLD: 52.7 % (ref 44–72)
NRBC # BLD AUTO: 0 K/UL
NRBC BLD-RTO: 0 /100 WBC
PLATELET # BLD AUTO: 189 K/UL (ref 164–446)
PMV BLD AUTO: 10.6 FL (ref 9–12.9)
POTASSIUM SERPL-SCNC: 4.6 MMOL/L (ref 3.6–5.5)
PROT SERPL-MCNC: 6.8 G/DL (ref 6–8.2)
RBC # BLD AUTO: 5.67 M/UL (ref 4.7–6.1)
SODIUM SERPL-SCNC: 140 MMOL/L (ref 135–145)
WBC # BLD AUTO: 6.1 K/UL (ref 4.8–10.8)

## 2019-06-13 PROCEDURE — 80175 DRUG SCREEN QUAN LAMOTRIGINE: CPT

## 2019-06-13 PROCEDURE — 82306 VITAMIN D 25 HYDROXY: CPT

## 2019-06-13 PROCEDURE — 80053 COMPREHEN METABOLIC PANEL: CPT

## 2019-06-13 PROCEDURE — 36415 COLL VENOUS BLD VENIPUNCTURE: CPT

## 2019-06-13 PROCEDURE — 80177 DRUG SCRN QUAN LEVETIRACETAM: CPT

## 2019-06-13 PROCEDURE — 85025 COMPLETE CBC W/AUTO DIFF WBC: CPT

## 2019-06-14 LAB
LAMOTRIGINE SERPL-MCNC: 1.8 UG/ML (ref 2.5–15)
LEVETIRACETAM SERPL-MCNC: 15 UG/ML (ref 12–46)

## 2019-06-19 ENCOUNTER — OFFICE VISIT (OUTPATIENT)
Dept: NEUROLOGY | Facility: MEDICAL CENTER | Age: 24
End: 2019-06-19
Payer: COMMERCIAL

## 2019-06-19 VITALS
HEIGHT: 69 IN | BODY MASS INDEX: 32.14 KG/M2 | RESPIRATION RATE: 16 BRPM | DIASTOLIC BLOOD PRESSURE: 76 MMHG | SYSTOLIC BLOOD PRESSURE: 122 MMHG | OXYGEN SATURATION: 94 % | WEIGHT: 217 LBS | TEMPERATURE: 96.5 F | HEART RATE: 100 BPM

## 2019-06-19 DIAGNOSIS — G47.33 OSA (OBSTRUCTIVE SLEEP APNEA): ICD-10-CM

## 2019-06-19 DIAGNOSIS — F41.9 ANXIETY DISORDER, UNSPECIFIED TYPE: ICD-10-CM

## 2019-06-19 DIAGNOSIS — F84.0 AUTISM SPECTRUM DISORDER: ICD-10-CM

## 2019-06-19 DIAGNOSIS — Z13.31 SCREENING FOR DEPRESSION: ICD-10-CM

## 2019-06-19 DIAGNOSIS — G47.10 HYPERSOMNIA: ICD-10-CM

## 2019-06-19 DIAGNOSIS — G40.309 GENERALIZED CONVULSIVE EPILEPSY (HCC): ICD-10-CM

## 2019-06-19 DIAGNOSIS — R56.9 SEIZURE (HCC): ICD-10-CM

## 2019-06-19 DIAGNOSIS — E66.9 OBESITY (BMI 30-39.9): ICD-10-CM

## 2019-06-19 PROCEDURE — 99215 OFFICE O/P EST HI 40 MIN: CPT | Performed by: PSYCHIATRY & NEUROLOGY

## 2019-06-19 RX ORDER — LEVETIRACETAM 750 MG/1
TABLET, FILM COATED, EXTENDED RELEASE ORAL
Qty: 60 TAB | Refills: 11 | Status: SHIPPED | OUTPATIENT
Start: 2019-06-19 | End: 2020-01-06 | Stop reason: SDUPTHER

## 2019-06-19 RX ORDER — LAMOTRIGINE 200 MG/1
200 TABLET, EXTENDED RELEASE ORAL DAILY
Qty: 30 TAB | Refills: 11 | Status: SHIPPED | OUTPATIENT
Start: 2019-06-19 | End: 2020-01-06 | Stop reason: SDUPTHER

## 2019-06-19 RX ORDER — CLONAZEPAM 1 MG/1
1 TABLET ORAL
Qty: 30 TAB | Refills: 5 | Status: SHIPPED | OUTPATIENT
Start: 2019-06-19 | End: 2019-12-16

## 2019-06-20 NOTE — PROGRESS NOTES
Chief Complaint   Patient presents with   • Follow-Up     Generalized convulsive epilepsy        Problem List Items Addressed This Visit     Autism spectrum disorder    Anxiety disorder    Obesity (BMI 30-39.9)      Other Visit Diagnoses     Generalized convulsive epilepsy (HCC)        Relevant Medications    Levetiracetam 750 MG TABLET SR 24 HR    clonazePAM (KLONOPIN) 1 MG Tab    LamoTRIgine 200 MG TABLET SR 24 HR    Seizure (HCC)        Relevant Medications    Levetiracetam 750 MG TABLET SR 24 HR    clonazePAM (KLONOPIN) 1 MG Tab    LamoTRIgine 200 MG TABLET SR 24 HR    KACY (obstructive sleep apnea)        Relevant Orders    REFERRAL TO PULMONOLOGY    Hypersomnia        Relevant Orders    REFERRAL TO PULMONOLOGY    Screening for depression              Interim history:  Braydon Sotelo returns in follow-up with his father.  He remains on Keppra ER 1500 mg nightly, and increased Lamictal to the ER formulation, currently taking 200 mg daily in the morning.  He believes that the current regimen is working, he has not had any seizures since I last saw him.  The patient and the father are quite happy about the outcome.  The patient underwent EEG, and blood work.    He does not drive, is not interested in driving, due to autism disorder.    The patient denies any worsening of mood.  He is not currently depressed, suicidal or homicidal.    The patient is complaining of hypersomnia during the day.  The father indicates that he often snores and gasps for air.      Past medical history:   Past Medical History:   Diagnosis Date   • Autism    • Autism spectrum disorder    • Seizure (HCC)     since 2013       Past surgical history:   Past Surgical History:   Procedure Laterality Date   • OTHER  1/10/2014    wisdom teeth extraction   • TONSILLECTOMY AND ADENOIDECTOMY         Family history:   History reviewed. No pertinent family history.    Social history:   Social History     Social History   • Marital status: Single      Spouse name: N/A   • Number of children: N/A   • Years of education: N/A     Occupational History   • Not on file.     Social History Main Topics   • Smoking status: Never Smoker   • Smokeless tobacco: Never Used   • Alcohol use No   • Drug use: No   • Sexual activity: Not on file     Other Topics Concern   • Not on file     Social History Narrative   • No narrative on file       Current medications:   Current Outpatient Prescriptions   Medication   • Levetiracetam 750 MG TABLET SR 24 HR   • clonazePAM (KLONOPIN) 1 MG Tab   • LamoTRIgine 200 MG TABLET SR 24 HR   • ergocalciferol (DRISDOL) 80042 UNIT capsule   • citalopram (CELEXA) 20 MG Tab   • lorazepam (ATIVAN) 1 MG Tab     No current facility-administered medications for this visit.        Medication Allergy:  No Known Allergies      Review of systems:   Constitutional: denies fever, night sweats, weight loss.  Eyes: denies acute vision change, eye pain or secretion.   Ears, Nose, Mouth, Throat: denies nasal secretion, nasal bleeding, difficulty swallowing, hearing loss, tinnitus, vertigo, ear pain, oral ulcers or lesions.   Endocrine: denies recent weight changes, heat or cold intolerance, polyuria, polydypsia, polyphagia,abnormal hair growth.  Cardiovascular: denies new onset of chest pain, palpitations, syncope, lower extremity edema, or dyspnea of exertion.  Pulmonary: denies shortness of breath, new onset of cough, hemoptysis, wheezing, chest pain or flu-like symptoms.   GI: denies nausea, vomiting, diarrhea, GI bleeding, change in appetite, abdominal pain, and change in bowel habits.  : denies urinary incontinence, retention or hematuria.  Heme/oncology: denies history of easy bruising or bleeding. No history of cancer. Allergy/immunology: denies hives/urticarial.  Dermatologic: denies new rash.  Musculoskeletal:denies joint swelling or pain, muscle pain, neck and back pain. Neurologic: denies headaches, acute visual changes, facial droopiness, muscle  "weakness (focal or generalized), paresthesias, anesthesia, ataxia, change in speech or language, memory loss, abnormal movements, seizures, loss of consciousness, or episodes of confusion.   Psychiatric: denies symptoms of worsening depression,  hallucinations, mood swings or changes, suicidal or homicidal thoughts.     Physical examination:   Vitals:    06/19/19 0853   BP: 122/76   BP Location: Right arm   Patient Position: Sitting   BP Cuff Size: Adult   Pulse: 100   Resp: 16   Temp: 35.8 °C (96.5 °F)   TempSrc: Temporal   SpO2: 94%   Weight: 98.4 kg (217 lb)   Height: 1.753 m (5' 9\")     General: Patient in no acute distress, pleasant and cooperative.  Obese.  HEENT: Normocephalic, no signs of acute trauma.   Neck: supple, no meningeal signs or carotid bruits. There is normal range of motion. No tenderness on exam.   Chest: clear to auscultation. No cough.   CV: RRR, no murmurs.   Skin: no signs of acute rashes or trauma.   Musculoskeletal: joints exhibit full range of motion, without any pain to palpation. There are no signs of joint or muscle swelling. There is no tenderness to deep palpation of muscles.   Psychiatric: No hallucinatory behavior. Denies symptoms of depression or suicidal ideation. Mood and affect appear normal on exam.     NEUROLOGICAL EXAM:   Mental status, orientation: Awake, alert and fully oriented.   Speech and language: speech is clear and fluent. The patient is able to name, repeat and comprehend.   Memory: There is intact recollection of recent and remote events.   Cranial nerve exam: Pupils are 3-4 mm bilaterally and equally reactive to light and accommodation. Visual fields are intact by confrontation. There is no nystagmus on primary or secondary gaze. Intact full EOM in all directions of gaze. Face appears symmetric. Sensation in the face is intact to light touch. Uvula is midline. Palate elevates symmetrically. Tongue is midline and without any signs of tongue biting or " fasciculations.Sternocleidomastoid muscles exhibit is normal strength bilaterally. Shoulder shrug is intact bilaterally.   Motor exam: Strength is 5/5 in all extremities. Tone is normal. No abnormal movements were seen on exam.   Sensory exam reveals normal sense of light touch in all extremities.   Deep tendon reflexes:  2+ throughout. Plantar responses are flexor. There is no clonus.   Coordination: shows a normal finger-nose-finger. Normal rapidly alternating movements.   Gait: The patient was able to get up from seated position on first attempt without requiring assistance. Found to be steady when walking. Movements were fluid with normal arm swing. The patient was able to turn without difficulties or tendency to fall. Romberg exam unremarkable.        ANCILLARY DATA REVIEWED:       Lab Data Review:  Reviewed in chart.  Keppra level was therapeutic.  Lamictal level was mildly subtherapeutic.  Vitamin D was normal.    Records reviewed:   Chart reviewed.    Imaging:   MRI brain wo, 11/23/13:  Normal.     EEG:  VEEG, 10/11/18:  Normal.      48-hour ambulatory EEG, 5/28/2019:  INTERPRETATION:   This is a normal 48 hours ambulatory electroencephalogram   recording in the awake, drowsy and sleep state.  No events were   captured during the study. Clinical correlation is recommended.  Note: A normal electroencephalogram does not rule out epilepsy.       ASSESSMENT AND PLAN:    1. Generalized convulsive epilepsy (HCC)  - LamoTRIgine 200 MG TABLET SR 24 HR; Take 200 mg by mouth every day.  Dispense: 30 Tab; Refill: 11    2. Seizure (HCC)  - Levetiracetam 750 MG TABLET SR 24 HR; TAKE TWO TABLETS BY MOUTH IN THE EVENING  Dispense: 60 Tab; Refill: 11  - clonazePAM (KLONOPIN) 1 MG Tab; Take 1 Tab by mouth every bedtime for 180 days.  Dispense: 30 Tab; Refill: 5    3. KACY (obstructive sleep apnea)  - REFERRAL TO PULMONOLOGY    4. Hypersomnia  - REFERRAL TO PULMONOLOGY    5. Obesity (BMI 30-39.9)    6. Autism spectrum  disorder    7. Anxiety disorder, unspecified type    8. Screening for depression        CLINICAL DISCUSSION:   The patient and family provide a history of uncontrolled epilepsy.  Seizures have been generalized convulsive.  The patient denies any clear aura.  Longest without a seizure was six months in the past, had weekly spells when I saw him first.  Compliant with meds. Some mood issues reported by family, probably Keppra not right med for him.  He has not had any further seizures on the adjusted regimen of Lamictal  mg daily in the morning, and levetiracetam and ER 1500 mg p.o. nightly.  The patient is currently denying any side effects.  His mood is stable.  His 48-hour EEG was normal.  I recommend that he is admitted to the epilepsy monitoring unit to further correct arise he spells, however both patient and father decided to wait at this point.  They do agreed that if the patient has another spell, they will proceed with EMU admission.  His spells need further characterization.   Remain on no driving for now.   Discussed diet and exercise at length.   Presentation concerning for obstructive sleep apnea.  The patient is obese.  Recommended weight loss.  Referral given to pulmonology.      FOLLOW-UP:   Return in about 6 months (around 12/19/2019).      EDUCATION AND COUNSELING:  -Education was provided to the patient and/or family regarding diagnosis and prognosis. The chronic and unpredictable nature of the condition were discussed. There is increased risk for additional events, which may carry potential for significant injuries and death. Discussed frequent seizure triggers: sleep deprivation, medication non-compliance, use of illegal drugs/alcohol, stress, and others.   -We reviewed in detail the current antiepileptic regimen. Potential side effects of antiepileptics were discussed at length, including but no limited to: hypersensitivity reactions (rash and others, some of which can be fatal), visual  field changes (some of which may be irreversible), glaucoma, diplopia, kidney stones, osteopenia/osteoporosis/bone fractures, hyperthermia/anhydrosis, hyponatremia, tremors/abnormal movements, ataxia, dizziness, fatigue, increased risk for falls, risk for cardiac arrhythmias/syncope, gastrointestinal side effects(hepatitis, pancreatitis, gastritis, ulcers), gingival hypertrophy/bleeding, drowsiness, sedation, anxiety/nervousness, increased risk for suicide, increased risk for depression, and psychosis.   -We also reviewed drug-drug interactions and their potential effect on seizure control and medication side effects.    -Recommend chronic vitamin D supplementation and regular exercise (if not contraindicated).   -Patient/family educated on risk for SUDEP (Sudden Death in Epilepsy). Counseling was provided on the importance of strict medication and follow up compliance. The patient/family understand the risks associated with non-adherence with the medical plan as outlined, including but not limited to an increased risk for breakthrough seizures, which may contribute to injuries, disability, status epilepticus, and even death.   -Counseling was also provided on potential effects of alcohol and other drugs, which may lower seizure threshold and/or affect the metabolism of antiepileptic drugs. We recommend avoidance of alcohol and illegal drugs.  -Avoid sleep deprivation.   -We extensively discussed the aspects related to safety in drivers who suffer from epilepsy. The patient is encourage to report to the Division of Motor Vehicles of any condition and/or spells related to confusion, disorientation, and/or loss of awareness and/or loss of consciousness; as these may pose a safety issue if they occur while operating a motor vehicle. The patient and/or family are ultimately responsible for exercising caution and abiding to regulations in place.   -Other seizure precautions were discussed at length, including no diving,  no skydiving, no climbing or exposure to unprotected heights, no unsupervised swimming, no Jacuzzi or bathing in bathtubs or deep bodies of water. The patient/family have been advised about risks for operating any machinery while suffering from seizures / syncope / epilepsy and/or while taking antiepileptic drugs.   -The patient understands and agrees that due to the complexity of his/her diagnosis, results of any testing and further recommendations will typically be discussed/made during a face to face encounter in my office. The patient and/or family further understands it is their responsibility to keep proper follow up.     Patient/family agree with plan, as outlined.         Mando Cooper MD   Epilepsy and Neurodiagnostics.   Clinical  of Neurology [facility].   Diplomate in Neurology, Epilepsy, and Electrodiagnostic Medicine.   Office: [phone]  Fax: [phone]      BILLING DOCUMENTATION:     I have performed physical exam and reviewed and updated ROS and plan today 6/19/2019. In review of that note, there are no new changes except as documented above.     Counseling:  I spent greater than 50% time face-to-face time of a total of 51 minutes visit. Over 50% of the time of the visit today was spent on counseling and or coordination of care wtih the patient and/or family, with greater than 50% of the total discussing my assessment and plan as stated above.

## 2019-06-27 ENCOUNTER — SLEEP CENTER VISIT (OUTPATIENT)
Dept: SLEEP MEDICINE | Facility: MEDICAL CENTER | Age: 24
End: 2019-06-27
Payer: COMMERCIAL

## 2019-06-27 VITALS
HEART RATE: 91 BPM | RESPIRATION RATE: 16 BRPM | DIASTOLIC BLOOD PRESSURE: 64 MMHG | WEIGHT: 218 LBS | OXYGEN SATURATION: 91 % | SYSTOLIC BLOOD PRESSURE: 102 MMHG | BODY MASS INDEX: 32.29 KG/M2 | HEIGHT: 69 IN

## 2019-06-27 DIAGNOSIS — F84.0 AUTISM: ICD-10-CM

## 2019-06-27 DIAGNOSIS — G47.10 HYPERSOMNOLENCE: ICD-10-CM

## 2019-06-27 DIAGNOSIS — R06.83 SNORING: ICD-10-CM

## 2019-06-27 DIAGNOSIS — G47.30 SLEEP APNEA, UNSPECIFIED TYPE: ICD-10-CM

## 2019-06-27 DIAGNOSIS — G40.409 OTHER GENERALIZED EPILEPSY, NOT INTRACTABLE, WITHOUT STATUS EPILEPTICUS (HCC): ICD-10-CM

## 2019-06-27 PROCEDURE — 99244 OFF/OP CNSLTJ NEW/EST MOD 40: CPT | Performed by: INTERNAL MEDICINE

## 2019-07-01 NOTE — PROGRESS NOTES
CC:  Snoring and daytime somnolence, suspected sleep apnea hypopnea syndrome.    HPI:   Mr. Sotelo is a 24-year-old man referred by Dr. Patrica Guzman to assist in the evaluation and management of suspected sleep-disordered breathing.    He has a history of chronic anxiety, autism spectrum disorder and a general motor seizure disorder, followed by neurology.  He is currently treated with Keppra at 1500 mg nightly and Lamictal at 200 mg each morning.  In the course of his evaluation, symptoms suggesting sleep disordered breathing were identified.    As reported by his patient and his father who is present for the interview, he has a regular sleep schedule with bedtime at about 11 PM and he falls asleep within about 15 minutes.  He may awaken twice on an average night and arises between 6 and 8 in the morning feeling tired and groggy without regular morning headaches.  He has noted his snoring but is not noticed cyclic breathing or apnea.  He does talk in his sleep.  His father's familiar with the symptoms of sleep apnea as he himself wears CPAP and 2 paternal uncles have similar problems.  He is tired during the day and regularly dozes off during quiet moments or as a passenger in a motor vehicle.  His Channing sleepiness score is at least 11 points.  He drinks caffeinated beverages sparingly and does not use substances to maintain wakefulness but he does use clonazepam at 1 mg nightly and he does also have lorazepam to use at 1 mg up to every 4 hours as needed for anxiety.  He has not previously been evaluated for sleep issues.  He does not have symptoms suggesting narcolepsy, parasomnia or restless leg syndrome        Patient Active Problem List    Diagnosis Date Noted   • Otitis of both ears 03/28/2019   • Obesity (BMI 30-39.9) 05/25/2017   • Need for vaccination 05/25/2017   • Anxiety disorder 03/22/2017   • Autism 10/04/2016   • Autism spectrum disorder 01/05/2015   • Epilepsy (HCC) 01/05/2015       Past  "Medical History:   Diagnosis Date   • Autism    • Autism spectrum disorder    • Seizure (HCC)     since 2013       Past Surgical History:   Procedure Laterality Date   • OTHER  1/10/2014    wisdom teeth extraction   • TONSILLECTOMY     • TONSILLECTOMY AND ADENOIDECTOMY         Family History   Problem Relation Age of Onset   • Sleep Apnea Father    • Hypertension Father    • Lung Disease Paternal Aunt    • Lung Disease Paternal Grandmother        Social History     Social History   • Marital status: Single     Spouse name: N/A   • Number of children: N/A   • Years of education: N/A     Occupational History   • Not on file.     Social History Main Topics   • Smoking status: Never Smoker   • Smokeless tobacco: Never Used   • Alcohol use No   • Drug use: No   • Sexual activity: Not on file     Other Topics Concern   • Not on file     Social History Narrative   • No narrative on file       Current Outpatient Prescriptions   Medication Sig Dispense Refill   • Levetiracetam 750 MG TABLET SR 24 HR TAKE TWO TABLETS BY MOUTH IN THE EVENING 60 Tab 11   • clonazePAM (KLONOPIN) 1 MG Tab Take 1 Tab by mouth every bedtime for 180 days. 30 Tab 5   • LamoTRIgine 200 MG TABLET SR 24 HR Take 200 mg by mouth every day. 30 Tab 11   • ergocalciferol (DRISDOL) 49757 UNIT capsule Take 1 Cap by mouth every 7 days. 4 Cap 5   • citalopram (CELEXA) 20 MG Tab Take 1 Tab by mouth every day. 90 Tab 3   • lorazepam (ATIVAN) 1 MG Tab Take 1 mg by mouth every four hours as needed for Anxiety.       No current facility-administered medications for this visit.     \"CURRENT RX\"      Allergies: Patient has no known allergies.      ROS  Positive for the sleep and neurologic issues reviewed above.  He is on citalopram 20 mg nightly.  All other aspects of the CPT review of systems process are negative as documented in the attached self history form.      Physical Exam:   /64 (BP Location: Right arm, Patient Position: Sitting, BP Cuff Size: Large " "adult)   Pulse 91   Resp 16   Ht 1.753 m (5' 9\")   Wt 98.9 kg (218 lb)   SpO2 91%   BMI 32.19 kg/m²    Head and neck examination demonstrates no mucosal lesion, purulent drainage or evident polyps. The pharynx is benign with a Mallampati III presentation. The neck is supple without thyromegaly. On chest examination there are symmetrical bilateral breath sounds without rales, wheezing or consolidation. On cardiac examination, the apical impulse and heart sounds are normal and the rhythm is regular. There is no murmur, gallop or rub and no jugular venous distention. The abdomen is soft with active bowel sounds and no palpable hepatosplenomegaly, mass, guarding or rebound. The extremities show no clubbing, cyanosis or edema and no signs of deep venous thrombosis. There is no warmth, redness, tenderness or palpable venous cord in the calves. The skin is clear, warm and dry. There is no unusual peripheral lymphadenopathy. Peripheral pulses are palpable in all 4 extremities. On neurologic examination, cranial nerve function is intact, motor tone is symmetrical, and the patient is alert, oriented and responsive.       Problems:  1. Sleep apnea, unspecified type  He presents with snoring and excessive daytime somnolence.  His a crowded posterior pharyngeal airway and a body mass index of 32.    2. Hypersomnolence  Probably related to the suspected sleep-disordered breathing.  He is spending sufficient nightly time in bed and does not seem to have major issues with sleep hygiene.    3. Snoring    4. Other generalized epilepsy, not intractable, without status epilepticus (HCC)  Treatment per neurology.  Effective treatment of sleep-disordered breathing is required as sleep deprivation may lower the seizure threshold.    5. Autism    6. BMI 32.0-32.9,adult  We have talked about the role of weight management in the treatment of sleep-disordered breathing.  - Height And Weight      Plan:   Polysomnogram, possible " split-night study.  I've requested an extended EEG montage given his history of seizure disorder.    Return visit after testing to discuss results and treatment options.    We appreciate the opportunity to assist in his care.

## 2019-07-14 ENCOUNTER — SLEEP STUDY (OUTPATIENT)
Dept: SLEEP MEDICINE | Facility: MEDICAL CENTER | Age: 24
End: 2019-07-14
Attending: INTERNAL MEDICINE
Payer: COMMERCIAL

## 2019-07-14 DIAGNOSIS — G47.30 SLEEP APNEA, UNSPECIFIED TYPE: ICD-10-CM

## 2019-07-14 DIAGNOSIS — G40.409 OTHER GENERALIZED EPILEPSY, NOT INTRACTABLE, WITHOUT STATUS EPILEPTICUS (HCC): ICD-10-CM

## 2019-07-14 PROCEDURE — 95811 POLYSOM 6/>YRS CPAP 4/> PARM: CPT | Performed by: FAMILY MEDICINE

## 2019-07-15 NOTE — PROCEDURES
Technical summary: The patient underwent a split-night polysomnogram. This was a 16 channel montage study to include a 6 channel EEG, a 2 channel EOG, and chin EMG, left and right leg EMG, a snore channel, a nasal pressure transducer, and a nasal oral airflow  thermistor and a CFLOW pressure transducer.   Respiratory effort was assessed with the use of a thoracic and abdominal monitor and overnight oximetry was obtained. Audio and video recordings were reviewed. This was a fully attended study and sleep stage scoring was performed. The test was technically adequate.    Scoring Criteria: A modification of the the AASM Manual for the Scoring of Sleep and Associated Events, 2012, was used.   Obstructive apnea was scored by cessation of airflow for at least 10 seconds with continuing respiratory effort.  Central apnea was scored by cessation of airflow for at least 10 seconds with no effort.  Hypopnea was scored by a 30% or more reduction in airflow for at least 10 seconds accompanied by an arterial oxygen desaturation of 3% or more.  (For Medicare patients, hypopneas were scored by a 30% or more reduction in airflow for at least 10 seconds accompanied by an arterial oxygen saturation of 4% or more, as required by their insurance, CMS.    Interpretation:  Study start time was 09:23:01 PM.  Total recording time was 3h 28.5m (208 minutes) with a total sleep time of 3h 10.5m (190 minutes) resulting in a sleep efficiency of 91.37%.Sleep latency from the start fo the study was 11 minutes minutes and REM latency from sleep onset was 00 minutes minutes. Normal SE, increased N3 and decreased REM.    Respiratory:   There were 5 apneas in total consisting of 1 obstructive apneas, 0 mixed apneas, and 4 central apneas.  There were 42 hypopneas in total.The apnea index was 1.57 per hour and the hypopnea index was 13.23 per hour.The overall AHI was 14.8, with a REM AHI of 0.00, and a supine AHI of 20.32.     Limb Movements:  There  were a total of 4 periodic leg movements, of which 0 were PLMS arousals.  This resulted in a PLMS index of 1.3 and a PLMS arousal index of 0.0    Oximetry:  The mean SaO2 was 91.0% for the diagnostic portion of the study, with a minimum SaO2 of 83.0%.    Treatment:    Interpretation:  Treatment recording time was 4h 56.0m (296 minutes) with a total sleep time of 4h 29.5m (269 minutes) resulting in a sleep efficiency of 91.0%.  Sleep latency from the start of treatment was 03 minutes minutes and REM latency from sleep onset was 0h 7.5m minutes.  The patient had 31 arousals in total for an arousal index of 6.9. Sleep stage showed normal SE, REM rebound but no N3.    Respiratory:   There were 10 apneas in total consisting of  0 obstructive apneas, 10 central apneas, and 0 mixed apneas for an apnea index of 2.23.  The patient had 26 hypopneas in total, which resulted in a hypopnea index of 5.79.  The overall AHI was 8.01, with a REM AHI of 11.83, and a supine AHI of 8.01.     27% of the apneas wee central apneas    Limb Movements:  There were a total of 0 periodic leg movements, of which 0 were PLMS arousals.  This resulted in a PLMS index of 0.0 and a PLMS arousal index of 0.0.    Oximetry:  The mean SaO2 during treatment was 92.0%, with a minimum oxygen saturation of 56.0%.    CPAP was tried from 5 cm H2O to 11 cm H2O.      CPAP Titration:  Due to the significant number of obstructive respiratory events observed during the diagnostic portion of the study a CPAP titration trial was performed during the second half of the night. The CPAP pressure was initiated at 5 cm of water and the pressure was increased in an attempt to eliminate all sleep disordered breathing and snoring. The CPAP pressure was increased to 11 cm water and at this final pressure the patient was observed in the supine but not REM sleep stage. The apnea hypopnea index improved to 0/hr with improved O2 shawn of  92%.He spent 0 % of sleep time below 89%  O2 saturation Snoring was resolved.  No supplemental oxygen was required.    Impression:  1.  Mild obstructive sleep apnea with AHI of 14.8/hr and O2 shawn 83 %. Due to severity of the disease he met the split study protocol. The titration started with CPAP 5 cm and the best tolerated was CPAP 11 cm. The AHI improved to 0/hr with improved O2 shawn of 92% and average O2 saturation of 93 %.       Recommendations:  I recommend CPAP 11 cm medium dreamwear FFM. I also recommend 30 day compliance download to assess the efficacy to the recommended pressure, measure leak, apnea hypopnea index and compliance for further outpatient monitoring and management of CPAP therapy. In some cases alternative treatment options may prove effective in resolving sleep apnea and these options include upper airway surgery, the use of a dental orthotic or weight loss and positional therapy. Clinical correlation is required. In general patients with sleep apnea are advised to avoid alcohol and sedatives and to not operate a motor vehicle while drowsy and are at a greater risk for cardiovascular disease.

## 2019-07-18 ENCOUNTER — NON-PROVIDER VISIT (OUTPATIENT)
Dept: HEALTH INFORMATION MANAGEMENT | Facility: MEDICAL CENTER | Age: 24
End: 2019-07-18
Payer: COMMERCIAL

## 2019-07-18 VITALS — WEIGHT: 219.4 LBS | HEIGHT: 70 IN | BODY MASS INDEX: 31.41 KG/M2

## 2019-07-18 DIAGNOSIS — E66.9 OBESITY (BMI 30-39.9): ICD-10-CM

## 2019-07-18 PROCEDURE — 97802 MEDICAL NUTRITION INDIV IN: CPT | Performed by: DIETITIAN, REGISTERED

## 2019-07-18 NOTE — PROGRESS NOTES
"7/18/2019    Rajat Guzman M.D.  24 y.o.   Time in/out: 1332 - 1418    Anthropometrics/Objective  Vitals:    07/18/19 1426   Weight: 99.5 kg (219 lb 6.4 oz)   Height: 1.778 m (5' 10\")       Body mass index is 31.48 kg/m².    Stated Goal Weight: 150#  See comprehensive patient history form for further information     Subjective:  -States he would like to lose weight  -Has gained 30# in the last year, per patient  -Does not eat many fruits/vegetables  -Eats a lot of frozen burritos and breakfast sandwiches d/t simplicity  -Hx of autism spectrum disorder    Nutrition Diagnosis (PES Statement)    Obese related to excessive energy intake and inadequate energy expenditure as evidenced by BMI > 30.    Nutrition Intervention  Meal and Snack  Recommend a general/healthful diet    Comprehensive Nutrition education Instruction or training leading to in-depth nutrition related knowledge about:  Combine carb, protein and fat at each meal, Meal timing and spacing, Metabolism of carb, protein, fat, Physical activity/exercise, Portion control, Label Reading and Handouts provided regarding topics discussed    Monitoring & Evaluation Plan  Behavioral-Environmental:  Physical activity:  Increase as tolerated    Food / Nutrient Intake:  Food intake:  Use the plate method recommendations for portions/balance at meals    Physical Signs / Symptoms:  Weight change:  Weight loss to goal      Assessment Notes:  Braydon and his parents are going to try to eat and offer at least 1 cup of vegetables and 1 piece of fruit each day, which should replace some food at a meal or his snacks.  I emphasized that he does not eat enough protein and is likely eating far too much CHO at meals.  Instead of eating two burritos for lunch or dinner he will start eating one and then will add vegetables and protein to that meal.      I reviewed the plate method with him and showed him how to use it for balance and portions at his meals.  I want to " see him again in one month to see what kind of changes he has made.

## 2019-08-22 ENCOUNTER — SLEEP CENTER VISIT (OUTPATIENT)
Dept: SLEEP MEDICINE | Facility: MEDICAL CENTER | Age: 24
End: 2019-08-22
Payer: COMMERCIAL

## 2019-08-22 VITALS
SYSTOLIC BLOOD PRESSURE: 120 MMHG | RESPIRATION RATE: 16 BRPM | HEART RATE: 58 BPM | OXYGEN SATURATION: 97 % | HEIGHT: 70 IN | WEIGHT: 213 LBS | BODY MASS INDEX: 30.49 KG/M2 | DIASTOLIC BLOOD PRESSURE: 80 MMHG

## 2019-08-22 DIAGNOSIS — G47.33 OSA (OBSTRUCTIVE SLEEP APNEA): ICD-10-CM

## 2019-08-22 DIAGNOSIS — G40.409 OTHER GENERALIZED EPILEPSY, NOT INTRACTABLE, WITHOUT STATUS EPILEPTICUS (HCC): ICD-10-CM

## 2019-08-22 PROCEDURE — 99214 OFFICE O/P EST MOD 30 MIN: CPT | Performed by: FAMILY MEDICINE

## 2019-08-22 NOTE — PROGRESS NOTES
[facility] Follow Up Note     Date: 8/22/2019 / Time: 10:04 AM    Patient ID:   Name:             Braydon Sotelo   YOB: 1995  Age:                 24 y.o.  male   MRN:               [id number]      Thank you for requesting a sleep medicine consultation on Braydon Sotelo at the sleep center. He presents today with the chief complaints of KACY and SS follow up. He has a history of chronic anxiety, autism spectrum disorder and a general motor seizure disorder    HISTORY OF PRESENT ILLNESS:       Pt is currently not on therapy. He goes to sleep around 11 pm and wakes up around 4-6 am. He is getting about 6 hrs of sleep on a good night and about 5 hr of sleep on a bad night. The bad nights are about few per week. He uses clonazepam at night time and has lorezepam PRN for anxiety. Overall, he doesnot finds his sleep refreshing.He does take regular naps. The naps are usually 120 min long.. The symptoms of excessive daytime and snoring has continued .     Since his last visit he had split night study which showed Mild obstructive sleep apnea with AHI of 14.8/hr and O2 shawn 83 %. Due to severity of the disease he met the split study protocol. The titration started with CPAP 5 cm and the best tolerated was CPAP 11 cm. The AHI improved to 0/hr with improved O2 shawn of 92% and average O2 saturation of 93 %.    Last seizure was about 2 months ago. He is on Levetiracetam , lamotaragine and klonopin. It is managed by neurology. He does not drive or have a drivers license.     REVIEW OF SYSTEMS:       Constitutional: Denies fevers, Denies weight changes  Ears/Nose/Throat/Mouth: Denies nasal congestion or sore throat   Cardiovascular: Denies chest pain or palpitations   Respiratory: Denies shortness of breath , Denies cough  Gastrointestinal/Hepatic: Denies abdominal pain,   Genitourinary: Deniesdysuria or frequency  Musculoskeletal/Rheum: Denies  joint pain and swelling   Skin/Breast:  "Denies rash,   Neurological: Denies headache,   Sleep: + snoring     Comprehensive review of systems form is reviewed with the patient and is attached in the EMR.     PMH:  has a past medical history of Autism, Autism spectrum disorder, and Seizure (HCC).  MEDS:   Current Outpatient Medications:   •  Levetiracetam 750 MG TABLET SR 24 HR, TAKE TWO TABLETS BY MOUTH IN THE EVENING, Disp: 60 Tab, Rfl: 11  •  clonazePAM (KLONOPIN) 1 MG Tab, Take 1 Tab by mouth every bedtime for 180 days., Disp: 30 Tab, Rfl: 5  •  LamoTRIgine 200 MG TABLET SR 24 HR, Take 200 mg by mouth every day., Disp: 30 Tab, Rfl: 11  •  ergocalciferol (DRISDOL) 17356 UNIT capsule, Take 1 Cap by mouth every 7 days., Disp: 4 Cap, Rfl: 5  •  citalopram (CELEXA) 20 MG Tab, Take 1 Tab by mouth every day., Disp: 90 Tab, Rfl: 3  •  lorazepam (ATIVAN) 1 MG Tab, Take 1 mg by mouth every four hours as needed for Anxiety., Disp: , Rfl:   ALLERGIES: No Known Allergies  SURGHX:   Past Surgical History:   Procedure Laterality Date   • OTHER  1/10/2014    wisdom teeth extraction   • TONSILLECTOMY     • TONSILLECTOMY AND ADENOIDECTOMY       SOCHX:  reports that he has never smoked. He has never used smokeless tobacco. He reports that he does not drink alcohol or use drugs..  FH:   Family History   Problem Relation Age of Onset   • Sleep Apnea Father    • Hypertension Father    • Lung Disease Paternal Aunt    • Lung Disease Paternal Grandmother          Physical Exam:  Vitals/ General Appearance:   Weight/BMI: Body mass index is 30.56 kg/m².  /80 (BP Location: Left arm, Patient Position: Sitting, BP Cuff Size: Adult)   Pulse (!) 58   Resp 16   Ht 1.778 m (5' 10\")   Wt 96.6 kg (213 lb)   SpO2 97%   Vitals:    08/22/19 0957   BP: 120/80   BP Location: Left arm   Patient Position: Sitting   BP Cuff Size: Adult   Pulse: (!) 58   Resp: 16   SpO2: 97%   Weight: 96.6 kg (213 lb)   Height: 1.778 m (5' 10\")       Pt. is alert and oriented to time, place and person. " Cooperative and in no apparent distress.       -Head: Atraumatic, normocephalic.   -. Nose: No inferior turbinate hypertophy  -. Throat: Oropharynx appears crowded in that the palate is overhanging (Malam Ritu scale 3. Tonsils are not enlarged, uvula is large, pharynx not inflamed.   -. Neck: Supple. No thyromegaly  -. Chest: Trachea central, no spine deformity   -. Lungs auscultation: B/L good air entry, vesicular breath sounds, no adventitious sounds  -. Heart auscultation: 1st and 2nd heart sounds normal, regular rhythm. No appreciable murmur.  - Extremities: no clubbing, no pedal edema.  - Skin: No rash  - NEUROLOGICAL EXAMINATION: On neurological exam, the patient was alert and oriented x3. speech was clear and fluent without dysarthria.      ASSESSMENT AND PLAN     1. Sleep Apnea .    The pathophysiology of sleep anea and the increased risk of cardiovascular morbidity from untreated sleep apnea is discussed in detail with the patient. He  also has seizure which can be worsened by her KACY.     He is urged to avoid supine sleep, weight gain and alcoholic beverages since all of these can worsen sleep apnea. He is cautioned against drowsy driving. If He feels sleepy while driving, He must pull over for a break/nap, rather than persist on the road, in the interest of He own safety and that of others on the road.   Plan   - Auto CPAP vs overnight CPAP titration vs dental appliance and surgeries was dicussed in detail. After informed discussion CPAP 11 cm with nasal pillow mask    - F/u in 8-10 weeks to assess the efficiacy of recommended pressure    -  SS  was reviewed and discussed with the pt   - compliance was reinforced     2. Regarding treatment of other past medical problems and general health maintenance,  He is urged to follow up with PCP.

## 2019-08-30 ENCOUNTER — NON-PROVIDER VISIT (OUTPATIENT)
Dept: HEALTH INFORMATION MANAGEMENT | Facility: MEDICAL CENTER | Age: 24
End: 2019-08-30
Payer: COMMERCIAL

## 2019-08-30 VITALS — BODY MASS INDEX: 30.65 KG/M2 | WEIGHT: 214.1 LBS | HEIGHT: 70 IN

## 2019-08-30 DIAGNOSIS — E66.9 OBESITY (BMI 30-39.9): ICD-10-CM

## 2019-08-30 PROCEDURE — 97803 MED NUTRITION INDIV SUBSEQ: CPT | Performed by: DIETITIAN, REGISTERED

## 2019-08-30 NOTE — PROGRESS NOTES
"Nutrition Reassess    8/30/2019    Rajat Burciaga M.D.   24 y.o.   Time in/out:  1520 - 1536    Subjective:  -Is eating more salads and some vegetables  -Is eating higher protein snacks instead of CHO-containing snacks  -Had fast food 1x in the last month    Anthropometrics/Objective  Vitals:    08/30/19 1540   Weight: 97.1 kg (214 lb 1.6 oz)   Height: 1.778 m (5' 10\")     Body mass index is 30.72 kg/m².    Previous weight/date: 219.4#  Change:  - 5.3#   ReAssesment/Notes:  Braydon has cut down the amount of pre-packaged foods he eats and has added more foods that he and his parents make, like sandwiches, on a daily basis.  He is including Kind Bars or greek yogurts for snacks now and states that he is \"proud\" of the changes he has made.      He will be continuing what he has been doing and will consider adding some dark leafy greens to his salads instead of only iceberg lettuce.  He also will be getting a CPAP machine soon and that may help with his energy levels in the afternoon, hopefully helping him to increase his activity.    He will be going to [facility] in October and he may or may not be seen by a RD before he goes.    Follow-up: ~ 1 month    "

## 2019-09-30 ENCOUNTER — TELEPHONE (OUTPATIENT)
Dept: NEUROLOGY | Facility: MEDICAL CENTER | Age: 24
End: 2019-09-30

## 2019-09-30 NOTE — TELEPHONE ENCOUNTER
Patient is requesting a refill on his vit d 50,000. His level was last checked in June and it seems to be corrected. Do you want pt to continue with the cap once a week or do an otc or stop completely?  Please advise.

## 2019-10-01 ENCOUNTER — TELEPHONE (OUTPATIENT)
Dept: NEUROLOGY | Facility: MEDICAL CENTER | Age: 24
End: 2019-10-01

## 2019-10-29 ENCOUNTER — NON-PROVIDER VISIT (OUTPATIENT)
Dept: HEALTH INFORMATION MANAGEMENT | Facility: MEDICAL CENTER | Age: 24
End: 2019-10-29
Payer: COMMERCIAL

## 2019-10-29 DIAGNOSIS — E66.9 OBESITY (BMI 30-39.9): ICD-10-CM

## 2019-10-29 PROCEDURE — 97803 MED NUTRITION INDIV SUBSEQ: CPT | Performed by: DIETITIAN, REGISTERED

## 2019-10-30 VITALS — BODY MASS INDEX: 31.41 KG/M2 | WEIGHT: 219.4 LBS | HEIGHT: 70 IN

## 2019-10-30 NOTE — PROGRESS NOTES
"Nutrition Reassess    10/30/2019    Rajat Burciaga M.D.   24 y.o.     Time In/Out: 5:05-5:38 pm       Subjective:  - not eating salads and vegetables regularly  - recently at [facility] and ate out frequently during the trip   - wants to get back into [facility] for exercise, but discouraged because his doctor discouraged this given hx of seizures   - mom packing/preparing most foods other than microwaved items   - tired/after taking medications  - started on CPAP, has not noticed any changes in his energy levels   - hungry at dinner, would like to have frozen 3 burritos     Anthropometrics/Objective    Vitals:    10/30/19 0811   Weight: 99.5 kg (219 lb 6.4 oz)   Height: 1.778 m (5' 10\")     Body mass index is 31.48 kg/m².        Weight Hx:  219.4 lb - 7/18   214.1 lb - 8/30   219.4 lb - 10/30 (today)     Total weight change: 0 (+5.3 lbs since last visit)     Diet Recall:  B - Oz Tobar egg white breakfast sausages (2)   S - 0  L - PB & Jelly on w/w Chris's bread  S - Kind bar   D - frozen bean and cheese or beef and cheese burrito (2), sometimes a prepackaged salad without the dressing (mom adds low fat ranch 1-2T)   S - 0    ReAssesment/Notes:    Braydon has regained weight since his last visit and after their trip to [facility]. He has not been regularly eating any fruits and vegetables as he was doing before. Braydon was somewhat hesitant on today's visit in regards to making changes to his diet, but did agree to work on getting back to adding 1 serving of frozen or fresh vegetables to dinner 2 nights a week. Adding vegetables should help him to feel casarez as well. Suggested bag of small pre-cut carrots for lunch in addition to his sandwich as another alternative.     As for activity he has been very tired which his mom reports due to his medications. Encouraged Braydon to orient his activity during the early part of the day when he has more energy instead of waiting until later. They are considering a gym at the " mall together and he is open to this idea and getting there at least once to try it out. Will reevaluate his willingness to change next visit, see how its doing adding back in vegetables, and finding some type of activity/exercise he can do regularly on his own.     Follow-up: 1 month

## 2019-12-03 ENCOUNTER — NON-PROVIDER VISIT (OUTPATIENT)
Dept: HEALTH INFORMATION MANAGEMENT | Facility: MEDICAL CENTER | Age: 24
End: 2019-12-03
Payer: COMMERCIAL

## 2019-12-03 VITALS — BODY MASS INDEX: 31.51 KG/M2 | WEIGHT: 220.1 LBS | HEIGHT: 70 IN

## 2019-12-03 DIAGNOSIS — E66.9 OBESITY (BMI 30-39.9): ICD-10-CM

## 2019-12-03 PROCEDURE — 97803 MED NUTRITION INDIV SUBSEQ: CPT | Performed by: DIETITIAN, REGISTERED

## 2019-12-04 NOTE — PROGRESS NOTES
"Nutrition Reassess    12/3/2019    Rajat Burciaga M.D.   24 y.o.     Time In/Out: 5:01-5:20 pm       Subjective:  - States he ate some of the Healthy Choice meals and more vegetables  - Difficult to eat healthy when his Dad brings home fast food  - Mom admits she does not know how to cook   - Not exercising because his parents have not signed him up or taken him to the gym yet     Anthropometrics/Objective    Vitals:    12/03/19 1735   Weight: 99.8 kg (220 lb 1.6 oz)   Height: 1.778 m (5' 10\")     Body mass index is 31.58 kg/m².        Weight Hx:  219.4 lb - 7/18   214.1 lb - 8/30   219.4 lb - 10/30   220.1 lb - today      Total weight change: +0.7lb    ReAssesment/Notes:    Braydon states he is eating more vegetables, however he is still eating fast food and packaged foods like burritos and breakfast sandwiches daily and not exercising. Braydon and his mother do not know how to cook which limits his ability to eat healthier, combined with being selective with the foods he likes to eat. I discussed with Braydon's mom that it would be beneficial for the family to meet together and talk about healthy eating and lifestyle change together as they do not appear to all be on the same page with helping Braydon to eat healthier and make better food choices. They both agreed. Braydon is open to trying some new foods and his mom has agreed to start practicing cooking. We discussed that the only way to get better at cooking is to practice. Braydon came up with a balanced meal that he would like to help his mom cook including shrimp, leafy greens, and brown rice. I walked them through the steps on how to cook shrimp and brown rice (minute brown rice or Seeds of Change as a start for quick option).     Braydon is still resistance to change in regards to exercise. I encouraged him to find an alternative to the gym as this is not a feasible solution for him or his family at this time and reminded him that there are many other " options for activity in his home that he can do instead.     Goals:  1) Start cooking, prepare one balanced meal together as discussed today     Follow-up: 6-8 weeks

## 2019-12-22 DIAGNOSIS — F84.0 AUTISM SPECTRUM DISORDER: ICD-10-CM

## 2019-12-24 RX ORDER — CITALOPRAM 20 MG/1
TABLET ORAL
Qty: 90 TAB | Refills: 0 | Status: SHIPPED | OUTPATIENT
Start: 2019-12-24 | End: 2020-04-06

## 2020-01-05 DIAGNOSIS — R56.9 SEIZURES (HCC): ICD-10-CM

## 2020-01-06 ENCOUNTER — OFFICE VISIT (OUTPATIENT)
Dept: NEUROLOGY | Facility: MEDICAL CENTER | Age: 25
End: 2020-01-06
Payer: COMMERCIAL

## 2020-01-06 VITALS
SYSTOLIC BLOOD PRESSURE: 118 MMHG | HEIGHT: 70 IN | DIASTOLIC BLOOD PRESSURE: 78 MMHG | RESPIRATION RATE: 18 BRPM | OXYGEN SATURATION: 96 % | WEIGHT: 218 LBS | BODY MASS INDEX: 31.21 KG/M2 | TEMPERATURE: 95.3 F | HEART RATE: 82 BPM

## 2020-01-06 DIAGNOSIS — G47.10 HYPERSOMNIA: ICD-10-CM

## 2020-01-06 DIAGNOSIS — Z13.31 SCREENING FOR DEPRESSION: ICD-10-CM

## 2020-01-06 DIAGNOSIS — R53.81 MALAISE AND FATIGUE: ICD-10-CM

## 2020-01-06 DIAGNOSIS — R53.83 MALAISE AND FATIGUE: ICD-10-CM

## 2020-01-06 DIAGNOSIS — G47.33 OBSTRUCTIVE SLEEP APNEA: ICD-10-CM

## 2020-01-06 DIAGNOSIS — R56.9 SEIZURE (HCC): ICD-10-CM

## 2020-01-06 DIAGNOSIS — F84.0 AUTISM SPECTRUM DISORDER: ICD-10-CM

## 2020-01-06 DIAGNOSIS — E66.9 OBESITY (BMI 30-39.9): ICD-10-CM

## 2020-01-06 DIAGNOSIS — G40.309 GENERALIZED CONVULSIVE EPILEPSY (HCC): ICD-10-CM

## 2020-01-06 PROCEDURE — 99215 OFFICE O/P EST HI 40 MIN: CPT | Performed by: PSYCHIATRY & NEUROLOGY

## 2020-01-06 RX ORDER — LORAZEPAM 1 MG/1
1 TABLET ORAL
Qty: 20 TAB | Refills: 2 | Status: SHIPPED | OUTPATIENT
Start: 2020-01-06 | End: 2020-04-05

## 2020-01-06 RX ORDER — LAMOTRIGINE 200 MG/1
200 TABLET, EXTENDED RELEASE ORAL DAILY
Qty: 30 TAB | Refills: 11 | Status: SHIPPED | OUTPATIENT
Start: 2020-01-06 | End: 2021-01-18

## 2020-01-06 RX ORDER — CLONAZEPAM 1 MG/1
1 TABLET ORAL
COMMUNITY
End: 2020-01-06 | Stop reason: SDUPTHER

## 2020-01-06 RX ORDER — CLONAZEPAM 1 MG/1
1 TABLET ORAL
Qty: 30 TAB | Refills: 5 | Status: SHIPPED | OUTPATIENT
Start: 2020-01-06 | End: 2020-07-04

## 2020-01-06 RX ORDER — LEVETIRACETAM 750 MG/1
TABLET, FILM COATED, EXTENDED RELEASE ORAL
Qty: 60 TAB | Refills: 11 | Status: SHIPPED | OUTPATIENT
Start: 2020-01-06 | End: 2021-01-18

## 2020-01-08 RX ORDER — CLONAZEPAM 1 MG/1
TABLET ORAL
Qty: 30 TAB | Refills: 5 | Status: SHIPPED | OUTPATIENT
Start: 2020-01-08 | End: 2020-07-06

## 2020-01-08 NOTE — PROGRESS NOTES
Chief Complaint   Patient presents with   • Follow-Up     Generalized convulsive epilepsy (HCC)       Problem List Items Addressed This Visit     Autism spectrum disorder    Obesity (BMI 30-39.9)      Other Visit Diagnoses     Generalized convulsive epilepsy (HCC)        Relevant Medications    Levetiracetam 750 MG TABLET SR 24 HR    LamoTRIgine 200 MG TABLET SR 24 HR    clonazePAM (KLONOPIN) 1 MG Tab    LORazepam (ATIVAN) 1 MG Tab    Seizure (HCC)        Relevant Medications    Levetiracetam 750 MG TABLET SR 24 HR    LamoTRIgine 200 MG TABLET SR 24 HR    clonazePAM (KLONOPIN) 1 MG Tab    Screening for depression        Malaise and fatigue        Hypersomnia        Obstructive sleep apnea              Interim history:  Braydon Sotelo returns with his mother in follow-up.  The patient remains seizure-free since April 2019.  He is doing great on a combination of 3 antiepileptics (levetiracetam, lamotrigine, and clonazepam).  He denies any clear side effects.  He is fully compliant with his regimen.  The patient does not drive.  The patient continues to complain of tiredness during the daytime, he continues to exhibit irregular sleep patterns, playing video games all night, also may not be wearing the CPAP correctly, as the mother indicates that there are parameters on the CPAP indicating so.  His mood is good, he denies any current symptoms of depression and or suicidal ideation.  He denies any new neurological complaints.    Past medical history:   Past Medical History:   Diagnosis Date   • Autism    • Autism spectrum disorder    • Seizure (HCC)     since 2013       Past surgical history:   Past Surgical History:   Procedure Laterality Date   • OTHER  1/10/2014    wisdom teeth extraction   • TONSILLECTOMY     • TONSILLECTOMY AND ADENOIDECTOMY         Family history:   Family History   Problem Relation Age of Onset   • Sleep Apnea Father    • Hypertension Father    • Lung Disease Paternal Aunt    • Lung Disease  Paternal Grandmother        Social history:   Social History     Socioeconomic History   • Marital status: Single     Spouse name: Not on file   • Number of children: Not on file   • Years of education: Not on file   • Highest education level: Not on file   Occupational History   • Not on file   Social Needs   • Financial resource strain: Not on file   • Food insecurity:     Worry: Not on file     Inability: Not on file   • Transportation needs:     Medical: Not on file     Non-medical: Not on file   Tobacco Use   • Smoking status: Never Smoker   • Smokeless tobacco: Never Used   Substance and Sexual Activity   • Alcohol use: No     Alcohol/week: 0.0 oz   • Drug use: No   • Sexual activity: Not on file   Lifestyle   • Physical activity:     Days per week: Not on file     Minutes per session: Not on file   • Stress: Not on file   Relationships   • Social connections:     Talks on phone: Not on file     Gets together: Not on file     Attends Mandaeism service: Not on file     Active member of club or organization: Not on file     Attends meetings of clubs or organizations: Not on file     Relationship status: Not on file   • Intimate partner violence:     Fear of current or ex partner: Not on file     Emotionally abused: Not on file     Physically abused: Not on file     Forced sexual activity: Not on file   Other Topics Concern   • Not on file   Social History Narrative   • Not on file       Current medications:   Current Outpatient Medications   Medication   • Levetiracetam 750 MG TABLET SR 24 HR   • LamoTRIgine 200 MG TABLET SR 24 HR   • clonazePAM (KLONOPIN) 1 MG Tab   • LORazepam (ATIVAN) 1 MG Tab   • citalopram (CELEXA) 20 MG Tab     No current facility-administered medications for this visit.        Medication Allergy:  No Known Allergies      Review of systems:   As indicated in history today.    Physical examination:   Vitals:    01/06/20 0946   BP: 118/78   BP Location: Left arm   Patient Position: Sitting  "  BP Cuff Size: Adult   Pulse: 82   Resp: 18   Temp: (!) 35.2 °C (95.3 °F)   TempSrc: Temporal   SpO2: 96%   Weight: 98.9 kg (218 lb)   Height: 1.778 m (5' 10\")     General: Patient in no acute distress, pleasant and cooperative.  Obese.  HEENT: Normocephalic, no signs of acute trauma.   Neck: supple, no meningeal signs or carotid bruits. There is normal range of motion. No tenderness on exam.   Chest: clear to auscultation. No cough.   CV: RRR, no murmurs.   Skin: no signs of acute rashes or trauma.   Musculoskeletal: joints exhibit full range of motion, without any pain to palpation. There are no signs of joint or muscle swelling. There is no tenderness to deep palpation of muscles.   Psychiatric: No hallucinatory behavior. Denies symptoms of depression or suicidal ideation. Mood and affect appear normal on exam.     NEUROLOGICAL EXAM:   Mental status, orientation: Awake, alert and fully oriented.   Speech and language: speech is clear and fluent. The patient is able to name, repeat and comprehend.   Memory: There is intact recollection of recent and remote events.   Cranial nerve exam: Pupils are 3-4 mm bilaterally and equally reactive to light and accommodation. Visual fields are intact by confrontation. There is no nystagmus on primary or secondary gaze. Intact full EOM in all directions of gaze. Face appears symmetric. Sensation in the face is intact to light touch. Uvula is midline. Palate elevates symmetrically. Tongue is midline and without any signs of tongue biting or fasciculations.Sternocleidomastoid muscles exhibit is normal strength bilaterally. Shoulder shrug is intact bilaterally.   Motor exam: Strength is 5/5 in all extremities. Tone is normal. No abnormal movements were seen on exam.   Sensory exam reveals normal sense of light touch in all extremities.   Deep tendon reflexes:  2+ throughout. Plantar responses are flexor. There is no clonus.   Coordination: shows a normal finger-nose-finger. " Normal rapidly alternating movements.   Gait: The patient was able to get up from seated position on first attempt without requiring assistance. Found to be steady when walking. Movements were fluid with normal arm swing. The patient was able to turn without difficulties or tendency to fall. Romberg exam was unremarkable.        ANCILLARY DATA REVIEWED:       Lab Data Review:  Reviewed in chart.    Records reviewed:   Chart reviewed.    Imaging:   MRI brain wo, 11/23/13:  Normal.     EEG:  VEEG, 10/11/18:  Normal.      48-hour ambulatory EEG, 5/28/2019:  INTERPRETATION:   This is a normal 48 hours ambulatory electroencephalogram   recording in the awake, drowsy and sleep state.  No events were   captured during the study. Clinical correlation is recommended.  Note: A normal electroencephalogram does not rule out epilepsy.            ASSESSMENT AND PLAN:    1. Generalized convulsive epilepsy (HCC)  - LamoTRIgine 200 MG TABLET SR 24 HR; Take 200 mg by mouth every day.  Dispense: 30 Tab; Refill: 11  - clonazePAM (KLONOPIN) 1 MG Tab; Take 1 Tab by mouth every bedtime for 180 days.  Dispense: 30 Tab; Refill: 5  - LORazepam (ATIVAN) 1 MG Tab; Take 1 Tab by mouth 1 time daily as needed for Anxiety (and seizures.) for up to 90 days.  Dispense: 20 Tab; Refill: 2    2. Seizure (HCC)  - Levetiracetam 750 MG TABLET SR 24 HR; TAKE TWO TABLETS BY MOUTH IN THE EVENING  Dispense: 60 Tab; Refill: 11    3. Screening for depression    4. Autism spectrum disorder    5. Malaise and fatigue    6. Hypersomnia    7. Obstructive sleep apnea    8. Obesity (BMI 30-39.9)      CLINICAL DISCUSSION:   The patient and family initially provided a history of uncontrolled epilepsy.  Seizures described as generalized tonic-clonic. The patient denies any clear aura.  Longest without a seizure was six months in the past, had weekly spells when I saw him first, however no further seizures since April 2019, has had great results with the current regiment.   Compliant with meds. Some mood issues reported by family initially, probably Keppra not right med for him.    Current antiepileptic drugs regimen:   Lamictal  mg daily in the morning,  levetiracetam ER 1500 mg p.o. nightly.    Clonazepam 1 mg p.o. nightly.      The patient is currently denying any side effects.  His mood is stable.  His 48-hour EEG was normal.  I recommended that he is admitted to the epilepsy monitoring unit to further correct arise he spells, however both patient and father decided to wait at this point.  They do agreed that if the patient has another spell, they will proceed with EMU admission.  His spells need further characterization.   Remain on no driving for now.  This was discussed again during today's visit  Discussed diet and exercise at length.       On a CPAP for obstructive sleep apnea.  The patient is obese.  Recommended weight loss.  He will continue to follow with pulmonology.    Planning to go visit [place], mother requested Ativan to be used as needed in case of seizures.  Discussed avoidance of sleep deprivation, and improvement of sleep hygiene.         FOLLOW-UP:   Return in about 6 months (around 7/6/2020).      EDUCATION AND COUNSELING:  -Education was provided to the patient and/or family regarding diagnosis and prognosis. The chronic and unpredictable nature of the condition were discussed. There is increased risk for additional events, which may carry potential for significant injuries and death. Discussed frequent seizure triggers: sleep deprivation, medication non-compliance, use of illegal drugs/alcohol, stress, and others.   -We reviewed in detail the current antiepileptic regimen. Potential side effects of antiepileptics were discussed at length, including but no limited to: hypersensitivity reactions (rash and others, some of which can be fatal), visual field changes (some of which may be irreversible), glaucoma, diplopia, kidney stones,  osteopenia/osteoporosis/bone fractures, hyperthermia/anhydrosis, hyponatremia, tremors/abnormal movements, ataxia, dizziness, fatigue, increased risk for falls, risk for cardiac arrhythmias/syncope, gastrointestinal side effects(hepatitis, pancreatitis, gastritis, ulcers), gingival hypertrophy/bleeding, drowsiness, sedation, anxiety/nervousness, increased risk for suicide, increased risk for depression, and psychosis.   -We also reviewed drug-drug interactions and their potential effect on seizure control and medication side effects.    -Recommend chronic vitamin D supplementation and regular exercise (if not contraindicated).   -Patient/family educated on risk for SUDEP (Sudden Death in Epilepsy). Counseling was provided on the importance of strict medication and follow up compliance. The patient/family understand the risks associated with non-adherence with the medical plan as outlined, including but not limited to an increased risk for breakthrough seizures, which may contribute to injuries, disability, status epilepticus, and even death.   -Counseling was also provided on potential effects of alcohol and other drugs, which may lower seizure threshold and/or affect the metabolism of antiepileptic drugs. We recommend avoidance of alcohol and illegal drugs.  He denies use.  -Avoid sleep deprivation.   -We extensively discussed the aspects related to safety in drivers who suffer from epilepsy. The patient is encourage to report to the Division of Motor Vehicles of any condition and/or spells related to confusion, disorientation, and/or loss of awareness and/or loss of consciousness; as these may pose a safety issue if they occur while operating a motor vehicle. The patient and/or family are ultimately responsible for exercising caution and abiding to regulations in place.  He does not drive.  -Other seizure precautions were discussed at length, including no diving, no skydiving, no climbing or exposure to  unprotected heights, no unsupervised swimming, no Jacuzzi or bathing in bathtubs or deep bodies of water. The patient/family have been advised about risks for operating any machinery while suffering from seizures / syncope / epilepsy and/or while taking antiepileptic drugs.   -The patient understands and agrees that due to the complexity of his/her diagnosis, results of any testing and further recommendations will typically be discussed/made during a face to face encounter in my office. The patient and/or family further understands it is their responsibility to keep proper follow up.     Patient/family agree with plan, as outlined.         Mando Cooper MD   Epilepsy and Neurodiagnostics.   Clinical  of Neurology [facility].   Diplomate in Neurology, Epilepsy, and Electrodiagnostic Medicine.   Office: [phone]  Fax: [phone]      BILLING DOCUMENTATION:     I have performed physical exam and reviewed and updated ROS and plan today 1/6/2020. In review of that note, there are no new changes except as documented above.     Counseling:  I spent greater than 50% time face-to-face time of a total of 48 minutes visit. Over 50% of the time of the visit today was spent on counseling and or coordination of care wtih the patient and/or family, with greater than 50% of the total discussing my assessment and plan as stated above.

## 2020-01-15 ENCOUNTER — NON-PROVIDER VISIT (OUTPATIENT)
Dept: HEALTH INFORMATION MANAGEMENT | Facility: MEDICAL CENTER | Age: 25
End: 2020-01-15
Payer: COMMERCIAL

## 2020-01-15 VITALS — HEIGHT: 70 IN | WEIGHT: 226.1 LBS | BODY MASS INDEX: 32.37 KG/M2

## 2020-01-15 DIAGNOSIS — E66.9 OBESITY (BMI 30-39.9): ICD-10-CM

## 2020-01-15 PROCEDURE — 97803 MED NUTRITION INDIV SUBSEQ: CPT | Performed by: DIETITIAN, REGISTERED

## 2020-01-16 NOTE — PROGRESS NOTES
"Nutrition Reassess    1/15/2020    Rajat Burciaga M.D.   24 y.o.     Time In/Out: 4:54-5:24 pm       Subjective:  -Mom reports lots of events surrounding food over the holidays   -Eating very few fruits and vegetables  -More shrimp lately with some green occasionally   -Doing some stretching exercise, no other PA  -More frequent fast food per Mom, Braydon often asks to go to [employer] and sometimes she has been saying no     Anthropometrics/Objective    Vitals:    01/15/20 1738   Weight: 102.6 kg (226 lb 1.6 oz)   Height: 1.778 m (5' 10\")     Body mass index is 32.44 kg/m².        Weight Hx:  219.4 lb - 7/18/19  214.1 lb - 8/30/19   219.4 lb - 10/30/19    220.1 lb - 12/3/19  226.1 lb - today   Total weight change: +6.7 lbs    Diet Recall:  B - 2 egg mcmuffins  S - kind bar  L - bean and cheese or beef and cheese frozen burritos   S - kind bar   D - salad with shrimp and light ranch    ReAssesment/Notes:  Braydon does not feel bad physically when he is eating poor food choices and has difficulty understanding why eating healthier and losing weight may be beneficial to him. He continues to eat the same processed foods regularly and lacking fruits and vegetables in his diet. Attempted to brainstorm ideas for items from these categories that he is willing to eat and likes, however he appears resistant. He remains physically inactive despite in-home ideas we have discussed. Encouraged Mom to limit frequency of fast food intake and discussed healthier options (avoiding fried options, choosing side apples or salad) however Braydon is not open to trying this. Next visit will try alternative breakfast/lunch options that are higher in fiber, potentially a cereal that he may be open to trying.     Follow-up: 4-6 weeks       "
Pt had an elevated ESr, an MRI/MRA (to R/O TA) ordered by neurologist and has it scheduled for 6/21/18. Pt was also referred to a vascular specialist, has appt on 6/19. form provided for clearance for sx and recommendations if applicable.

## 2020-02-19 ENCOUNTER — NON-PROVIDER VISIT (OUTPATIENT)
Dept: HEALTH INFORMATION MANAGEMENT | Facility: MEDICAL CENTER | Age: 25
End: 2020-02-19
Payer: COMMERCIAL

## 2020-02-19 VITALS — WEIGHT: 226.5 LBS | BODY MASS INDEX: 32.43 KG/M2 | HEIGHT: 70 IN

## 2020-02-19 DIAGNOSIS — E66.9 OBESITY (BMI 30-39.9): ICD-10-CM

## 2020-02-19 PROCEDURE — 97803 MED NUTRITION INDIV SUBSEQ: CPT | Performed by: DIETITIAN, REGISTERED

## 2020-02-20 NOTE — PROGRESS NOTES
"Nutrition Reassess    2/19/2020    Rajat Burciaga M.D.   24 y.o.     Time In/Out: 5:10-5:40 pm       Subjective:  -Eating 2 burritos from the [employer] truck up to 3 nights a week   -Starting walking on the treadmill at the gym, has gone once so far, mom concerned about pt \"going to hard\"     Anthropometrics/Objective    Vitals:    02/19/20 1736   Weight: 102.7 kg (226 lb 8 oz)   Height: 1.778 m (5' 10\")     Body mass index is 32.5 kg/m².        Weight Hx:  219.4 lb - 7/18/19  214.1 lb - 8/30/19   219.4 lb - 10/30/19    220.1 lb - 12/3/19  226.1 lb - 1/15/20  226.5 lb - today   Total weight change: +7.1 lbs    Diet Recall:  B - not discussed   L - PB & J and a KIND bar   S - salad with shrimp and ranch  D - 2 burritos from [employer] (a few nights a week)    ReAssesment/Notes: Braydon continues to struggle with adding in additional fruits and vegetables to his diet and resistant to only having 1 burrito at the [employer]. I have encouraged him mom to prepare healthier snacks for him and put them in his lunch since she packs it. Brainstormed some ideas for additional lunch ideas. Braydon has agreed to going to the gym once a week on the weekends. We discussed using the \"talk test\" when exercising to ensure he isn't overdoing it as he is just starting back at physical activity. He is resistant to going any more than this. He remains resistant with any changes to his diet, but did agree to the below goals.     Goals:  1) Gym once a weekend   2) Add 1 healthy snack (fruit or veggie with dip) to lunch once a week     Follow-up: 4-6 weeks       "

## 2020-04-01 ENCOUNTER — TELEMEDICINE (OUTPATIENT)
Dept: HEALTH INFORMATION MANAGEMENT | Facility: MEDICAL CENTER | Age: 25
End: 2020-04-01
Payer: COMMERCIAL

## 2020-04-01 DIAGNOSIS — E66.9 OBESITY (BMI 30-39.9): ICD-10-CM

## 2020-04-01 PROCEDURE — 97803 MED NUTRITION INDIV SUBSEQ: CPT | Mod: 95,CR | Performed by: DIETITIAN, REGISTERED

## 2020-04-01 NOTE — PROGRESS NOTES
Nutrition Reassess    4/1/2020    Rajat Burciaga M.D.   24 y.o.     Time In/Out: 5:03-5:27 pm     This encounter was conducted via Zoom .   Verbal consent was obtained. Patient's identity was verified.    Subjective:  -[place], Braydon's mother, bought frozen vegetables and has been cooking those for dinner  -[place] also wanting Braydon to get more vitamin C  -Has gym membership, but due to COVID and stay in place order membership has been on hold  -Still eating burritos and pot stickers   -Tired after taking medications in the morning, wants to sleep most of the day    Anthropometrics/Objective  *No weight obtained due to virtual visit       ReAssesment/Notes:  Braydon has been more open to eating vegetables lately by including them at dinner and snacking on celery with peanut butter. Discussed vitamin C containing foods he can incorporate into his diet and suggested due to pt's self-limited food selections that he take an adult multivitamin daily. Unfortunately his medications contribute to tiredness and fatigue. After our discussion Braydon has decided he can walk the stairs at his house in the morning after breakfast, but before lunch when his energy levels are the highest. He is going to aim to do this 3-4x a week in addition to already taking his dog out when needed. Suggested him and his family track his progress on a calendar and brainstorm a non food related reward for when he completes 4 weeks straight of his goal to help with motivation.     Follow-up: 1 month

## 2020-04-05 DIAGNOSIS — F84.0 AUTISM SPECTRUM DISORDER: ICD-10-CM

## 2020-04-06 RX ORDER — CITALOPRAM 20 MG/1
TABLET ORAL
Qty: 90 TAB | Refills: 0 | Status: SHIPPED | OUTPATIENT
Start: 2020-04-06 | End: 2020-07-06

## 2020-04-06 NOTE — TELEPHONE ENCOUNTER
Requested Prescriptions     Pending Prescriptions Disp Refills   • citalopram (CELEXA) 20 MG Tab [Pharmacy Med Name: Citalopram Hydrobromide 20 MG Oral Tablet] 90 Tab 0     Sig: Take 1 tablet by mouth once daily

## 2020-04-23 ENCOUNTER — SLEEP CENTER VISIT (OUTPATIENT)
Dept: SLEEP MEDICINE | Facility: MEDICAL CENTER | Age: 25
End: 2020-04-23
Payer: COMMERCIAL

## 2020-04-23 VITALS
SYSTOLIC BLOOD PRESSURE: 122 MMHG | WEIGHT: 237 LBS | RESPIRATION RATE: 14 BRPM | HEIGHT: 70 IN | HEART RATE: 71 BPM | OXYGEN SATURATION: 91 % | DIASTOLIC BLOOD PRESSURE: 84 MMHG | BODY MASS INDEX: 33.93 KG/M2

## 2020-04-23 DIAGNOSIS — G47.33 OSA (OBSTRUCTIVE SLEEP APNEA): ICD-10-CM

## 2020-04-23 PROCEDURE — 99213 OFFICE O/P EST LOW 20 MIN: CPT | Performed by: FAMILY MEDICINE

## 2020-04-23 ASSESSMENT — FIBROSIS 4 INDEX: FIB4 SCORE: 0.39

## 2020-04-23 NOTE — PROGRESS NOTES
[facility] Follow Up Note     Date: 4/23/2020 / Time: 8:24 AM    Patient ID:   Name:             Braydon Sotelo   YOB: 1995  Age:                 24 y.o.  male   MRN:               [id number]      Thank you for requesting a sleep medicine consultation on Braydon Sotelo at the sleep center. He presents today with the chief complaints of KACY follow up.     HISTORY OF PRESENT ILLNESS:       Pt is currently on CPAP 11 cm. He goes to sleep around 11 pm and wakes up around 5:30 am. He is getting about 6 hrs of sleep on a good night. The bad nights are rare per week. Overall,  He does  finds his sleep refreshing and his frequency on seizures have improved significantly. As per dad he did not have any seizure sijce he has been on the CPAP.He does take regular naps. The naps are usually 30+ min long.      He is using CPAP most days of the week. Pt reports 5 hrs of average nightly use of CPAP. Pt denies snoring, gasping,choking.Pt also denies significant mask leak that is interfering with sleep. The 30 day compliance was downloaded which shows adequate compliance with more that 4 hr usage about 76%. The AHI is has improved to 1.1/hr. The mask leak is normal. The symptoms of excessive daytime, snoring and gasping has improved.     SLEEP HISTORY   split night study which showed Mild obstructive sleep apnea with AHI of 14.8/hr and O2 shawn 83 %. Due to severity of the disease he met the split study protocol. The titration started with CPAP 5 cm and the best tolerated was CPAP 11 cm. The AHI improved to 0/hr with improved O2 shawn of 92% and average O2 saturation of 93 %.      REVIEW OF SYSTEMS:       Constitutional: Denies fevers, Denies weight changes  Eyes: Denies changes in vision, no eye pain  Ears/Nose/Throat/Mouth: Denies nasal congestion or sore throat   Cardiovascular: Denies chest pain or palpitations   Respiratory: Denies shortness of breath , Denies  "cough  Gastrointestinal/Hepatic: Denies abdominal pain, nausea, vomiting, diarrhea, constipation or GI bleeding   Genitourinary: Deniesdysuria or frequency  Musculoskeletal/Rheum: Denies  joint pain and swelling   Skin/Breast: Denies rash,   Neurological: Denies headache, confusion, memory loss or focal weakness/parasthesias  Psychiatric: denies mood disorder   Sleep: denies snoring    Comprehensive review of systems form is reviewed with the patient and is attached in the EMR.     PMH:  has a past medical history of Autism, Autism spectrum disorder, and Seizure (HCC).  MEDS:   Current Outpatient Medications:   •  citalopram (CELEXA) 20 MG Tab, Take 1 tablet by mouth once daily, Disp: 90 Tab, Rfl: 0  •  clonazePAM (KLONOPIN) 1 MG Tab, TAKE 1 TABLET BY MOUTH AT BEDTIME, Disp: 30 Tab, Rfl: 5  •  Levetiracetam 750 MG TABLET SR 24 HR, TAKE TWO TABLETS BY MOUTH IN THE EVENING, Disp: 60 Tab, Rfl: 11  •  LamoTRIgine 200 MG TABLET SR 24 HR, Take 200 mg by mouth every day., Disp: 30 Tab, Rfl: 11  •  clonazePAM (KLONOPIN) 1 MG Tab, Take 1 Tab by mouth every bedtime for 180 days., Disp: 30 Tab, Rfl: 5  ALLERGIES: No Known Allergies  SURGHX:   Past Surgical History:   Procedure Laterality Date   • OTHER  1/10/2014    wisdom teeth extraction   • TONSILLECTOMY     • TONSILLECTOMY AND ADENOIDECTOMY       SOCHX:  reports that he has never smoked. He has never used smokeless tobacco. He reports that he does not drink alcohol or use drugs..  FH:   Family History   Problem Relation Age of Onset   • Sleep Apnea Father    • Hypertension Father    • Lung Disease Paternal Aunt    • Lung Disease Paternal Grandmother          Physical Exam:  Vitals/ General Appearance:   Weight/BMI: Body mass index is 34.01 kg/m².  /84 (BP Location: Right arm, Patient Position: Sitting, BP Cuff Size: Adult)   Pulse 71   Resp 14   Ht 1.778 m (5' 10\")   Wt 107.5 kg (237 lb)   SpO2 91%   Vitals:    04/23/20 0808   BP: 122/84   BP Location: Right arm " "  Patient Position: Sitting   BP Cuff Size: Adult   Pulse: 71   Resp: 14   SpO2: 91%   Weight: 107.5 kg (237 lb)   Height: 1.778 m (5' 10\")       Pt. is alert and oriented to time, place and person. Cooperative and in no apparent distress.       -Head: Atraumatic, normocephalic.   -. Throat: Oropharynx appears crowded in that the palate is overhanging.   -. Neck: Supple. No thyromegaly  -. Chest: Trachea central, no spine deformity   -. Lungs auscultation: B/L good air entry, vesicular breath sounds, no adventitious sounds  -. Heart auscultation: 1st and 2nd heart sounds normal, regular rhythm. No appreciable murmur.  - Extremities: no clubbing, no pedal edema.  - Skin: No rash  - NEUROLOGICAL EXAMINATION: On neurological exam, the patient was alert and oriented x3. speech was clear and fluent without dysarthria.      ASSESSMENT AND PLAN     1 Sleep Apnea     The pathophysiology of sleep anea and the increased risk of cardiovascular morbidity from untreated sleep apnea is discussed in detail with the patient.   He is urged to avoid supine sleep, weight gain and alcoholic beverages since all of these can worsen sleep apnea. He is cautioned against drowsy driving. If He feels sleepy while driving, He must pull over for a break/nap, rather than persist on the road, in the interest of He own safety and that of others on the road.   Plan   - Continue CPAP 11 cm with FFM     - compliance download was reviewed and discussed with the pt   - compliance was reinforced     2. Regarding treatment of other past medical problems and general health maintenance,  He is urged to follow up with PCP.      "

## 2020-04-29 ENCOUNTER — NON-PROVIDER VISIT (OUTPATIENT)
Dept: HEALTH INFORMATION MANAGEMENT | Facility: MEDICAL CENTER | Age: 25
End: 2020-04-29
Payer: COMMERCIAL

## 2020-04-29 DIAGNOSIS — E66.9 OBESITY (BMI 30-39.9): ICD-10-CM

## 2020-04-29 PROCEDURE — 97803 MED NUTRITION INDIV SUBSEQ: CPT | Mod: 95,CR | Performed by: DIETITIAN, REGISTERED

## 2020-04-30 NOTE — PROGRESS NOTES
Nutrition Reassess    4/30/2020    Rajat Burciaga M.D.   24 y.o.     Time In/Out: 5:15-5:45 pm     This encounter was conducted via Zoom .   Verbal consent was obtained. Patient's identity was verified.    Subjective:  -Sometimes getting up to walk around house/complex with his Dad in the morning, then very tired and goes back to sleep  -Staying up late at night watching television  -Per mom, she is trying to keep healthier foods in the house, but Braydon often buys his own (goldfish etc) and pushes back when she tries to remind him to have 1 serving  -Wants to eat when he sees his Dad eating    Anthropometrics/Objective  *Unable to assess to virtual visit   There were no vitals filed for this visit.  There is no height or weight on file to calculate BMI.        ReAssesment/Notes:  Braydon struggles to understand the benefits of eating healthy, exercising and making lifestyle changes which makes it difficult for him to commit to any changes. He is resistant to most recommendations by family or during visits or agrees to them to appease. Braydon's mother is trying to assist him with keeping on a routine similar to when he is working for some consistency. We discussed the benefits of eating on a schedule, but unfortunately Braydon is able to obtain food and snacks whenever he desires. His mother agrees to continue to offer and keep healthy foods on hand and provide when eating family style. His medications that cause drowsiness remain a very large barrier to getting Braydon more active.     Follow-up: 2 months

## 2020-06-23 ENCOUNTER — APPOINTMENT (OUTPATIENT)
Dept: HEALTH INFORMATION MANAGEMENT | Facility: MEDICAL CENTER | Age: 25
End: 2020-06-23
Payer: COMMERCIAL

## 2020-07-01 ENCOUNTER — NON-PROVIDER VISIT (OUTPATIENT)
Dept: HEALTH INFORMATION MANAGEMENT | Facility: MEDICAL CENTER | Age: 25
End: 2020-07-01
Payer: COMMERCIAL

## 2020-07-01 DIAGNOSIS — E66.9 OBESITY (BMI 30-39.9): ICD-10-CM

## 2020-07-01 PROCEDURE — 98968 PH1 ASSMT&MGMT NQHP 21-30: CPT | Mod: CR | Performed by: DIETITIAN, REGISTERED

## 2020-07-02 NOTE — PROGRESS NOTES
"Nutrition Reassess    7/1/2020    Rajat Burciaga M.D.   25 y.o.       COVID-19 VIRTUAL VISIT   This encounter was conducted via telephone call.   Patient initiated/verbally consented to telephone visit. Patient's identity was verified.  Call start time: 4:17; Call end time: 4:42      Subjective:  -walking with a friend to the video game store or [employer] instead of being driven, waking on average 2x a week   -walked too much poor shoes and now has blisters in his feet and is sore  -tried cherries, did not like them     Anthropometrics/Objective  Last weight filed: 237 lb (4/23/20)  BMI 34.01    ReAssesment/Notes:  Braydon has increased his physical activity since his last visit. He appears to have a friend who is encouraging him to be more active and supporting him with weight loss. Discussed importance of surrounding himself with friends who encourage a healthy lifestyle. He remains trying new foods, so I applauded him for this. Discussed starting out slow with exercise, not \"overdoing it\", and preparing ahead by making good shoe choices and packing water and snacks when he goes. Encouraged Braydon to continue his walking 2 days a week with an increase as tolerated.     Follow-up: 2 months         "

## 2020-07-05 DIAGNOSIS — F84.0 AUTISM SPECTRUM DISORDER: ICD-10-CM

## 2020-07-06 RX ORDER — CITALOPRAM 20 MG/1
TABLET ORAL
Qty: 90 TAB | Refills: 0 | Status: SHIPPED | OUTPATIENT
Start: 2020-07-06 | End: 2020-10-12

## 2020-07-19 DIAGNOSIS — G40.309 GENERALIZED CONVULSIVE EPILEPSY (HCC): ICD-10-CM

## 2020-07-20 RX ORDER — CLONAZEPAM 1 MG/1
TABLET ORAL
Qty: 30 TAB | Refills: 5 | Status: SHIPPED | OUTPATIENT
Start: 2020-07-20 | End: 2021-01-12

## 2020-07-23 ENCOUNTER — TELEPHONE (OUTPATIENT)
Dept: NEUROLOGY | Facility: MEDICAL CENTER | Age: 25
End: 2020-07-23

## 2020-07-23 NOTE — TELEPHONE ENCOUNTER
I  Called the [facility] pharmacy and called in KLONOPIN 1mg, TAKE 1 TABLET BY MOUTH ONCE DAILY AT BEDTIME, Disp-30 Tab,R-5, to a pharmacist brad

## 2020-07-29 ENCOUNTER — APPOINTMENT (OUTPATIENT)
Dept: HEALTH INFORMATION MANAGEMENT | Facility: MEDICAL CENTER | Age: 25
End: 2020-07-29
Payer: COMMERCIAL

## 2020-10-10 DIAGNOSIS — F84.0 AUTISM SPECTRUM DISORDER: ICD-10-CM

## 2020-10-12 RX ORDER — CITALOPRAM 20 MG/1
TABLET ORAL
Qty: 90 TAB | Refills: 0 | Status: SHIPPED | OUTPATIENT
Start: 2020-10-12 | End: 2021-01-12

## 2020-10-13 ENCOUNTER — TELEPHONE (OUTPATIENT)
Dept: NEUROLOGY | Facility: MEDICAL CENTER | Age: 25
End: 2020-10-13

## 2020-10-13 NOTE — TELEPHONE ENCOUNTER
Mom stated pt had seizure this morning went to check on pt about 5 am and found him laying on the floor. Mom stated she heard pt at 2 am this morning so pt was still awake at that time and mom thinks pt had sz between 2-5 am. Pt was conscious when found, a cut on his eye not sure if pt hit the nightstand when he had a sz. Mom gave pt ativan and pt fell asleep. Travon came to their home evaluated pt and stated he was ok that he did not need to go to the ed. Mom is unsure if pt took his clonazepam last night.

## 2020-10-19 ENCOUNTER — SLEEP CENTER VISIT (OUTPATIENT)
Dept: SLEEP MEDICINE | Facility: MEDICAL CENTER | Age: 25
End: 2020-10-19
Payer: COMMERCIAL

## 2020-10-19 VITALS
HEIGHT: 70 IN | OXYGEN SATURATION: 97 % | SYSTOLIC BLOOD PRESSURE: 124 MMHG | DIASTOLIC BLOOD PRESSURE: 84 MMHG | HEART RATE: 78 BPM | RESPIRATION RATE: 14 BRPM | BODY MASS INDEX: 34.5 KG/M2 | WEIGHT: 241 LBS

## 2020-10-19 DIAGNOSIS — G40.409 OTHER GENERALIZED EPILEPSY, NOT INTRACTABLE, WITHOUT STATUS EPILEPTICUS (HCC): ICD-10-CM

## 2020-10-19 DIAGNOSIS — G47.33 OSA (OBSTRUCTIVE SLEEP APNEA): ICD-10-CM

## 2020-10-19 PROCEDURE — 99213 OFFICE O/P EST LOW 20 MIN: CPT | Performed by: FAMILY MEDICINE

## 2020-10-19 ASSESSMENT — FIBROSIS 4 INDEX: FIB4 SCORE: 0.41

## 2020-10-19 NOTE — PROGRESS NOTES
[facility] Sleep Center Follow Up Note     Date: 10/19/2020 / Time: 8:06 AM    Patient ID:   Name:             Braydon Sotelo   YOB: 1995  Age:                 25 y.o.  male   MRN:               [id number]      Thank you for requesting a sleep medicine consultation on Braydon Sotelo at the sleep center. He presents today with the chief complaints of KACY follow up.     HISTORY OF PRESENT ILLNESS:       Pt is currently on CPAP 11 cm. He is getting about 7 hrs of sleep on a good night and about 6 hr of sleep on a bad night. Overall,  He does  finds his sleep refreshing. he take regular naps. The naps are usually 1-2 hr long.He denies any symptoms of RLS, narcolepsy or any symptoms to suggest parasomnias such as nightmares, sleep walking or acting out of dreams.      He is using CPAP most days of the week. Pt reports 6 hrs of average nightly use of CPAP. Pt denies snoring, gasping,choking.Pt also denies significant mask leak that is interfering with sleep. The 30 day compliance was downloaded which shows inadequate compliance with more that 4 hr usage about 60%. The AHI is has improved to 2.4/hr. The mask leak is abnormal. The symptoms of KACY are well controlled on CPAP. He missed days in last 2 weeks because he had a seizure and bit his tongue.    SLEEP HISTORY   split night study which showed Mild obstructive sleep apnea with AHI of 14.8/hr and O2 shawn 83 %. Due to severity of the disease he met the split study protocol. The titration started with CPAP 5 cm and the best tolerated was CPAP 11 cm. The AHI improved to 0/hr with improved O2 shawn of 92% and average O2 saturation of 93 %.      REVIEW OF SYSTEMS:       Constitutional: Denies fevers, Denies weight changes  Eyes: Denies changes in vision, no eye pain  Ears/Nose/Throat/Mouth: Denies nasal congestion or sore throat   Cardiovascular: Denies chest pain or palpitations   Respiratory: Denies shortness of breath , Denies  "cough  Gastrointestinal/Hepatic: Denies abdominal pain, nausea, vomiting, diarrhea, constipation or GI bleeding   Genitourinary: Deniesdysuria or frequency  Musculoskeletal/Rheum: Denies  joint pain and swelling   Skin/Breast: Denies rash,   Neurological: Denies headache, confusion, memory loss or focal weakness/parasthesias, + seizure  Psychiatric: denies mood disorder   Sleep: denies snoring     Comprehensive review of systems form is reviewed with the patient and is attached in the EMR.     PMH:  has a past medical history of Autism, Autism spectrum disorder, and Seizure (HCC).  MEDS:   Current Outpatient Medications:   •  citalopram (CELEXA) 20 MG Tab, Take 1 tablet by mouth once daily, Disp: 90 Tab, Rfl: 0  •  clonazePAM (KLONOPIN) 1 MG Tab, TAKE 1 TABLET BY MOUTH ONCE DAILY AT BEDTIME, Disp: 30 Tab, Rfl: 5  •  Levetiracetam 750 MG TABLET SR 24 HR, TAKE TWO TABLETS BY MOUTH IN THE EVENING, Disp: 60 Tab, Rfl: 11  •  LamoTRIgine 200 MG TABLET SR 24 HR, Take 200 mg by mouth every day., Disp: 30 Tab, Rfl: 11  ALLERGIES: No Known Allergies  SURGHX:   Past Surgical History:   Procedure Laterality Date   • OTHER  1/10/2014    wisdom teeth extraction   • TONSILLECTOMY     • TONSILLECTOMY AND ADENOIDECTOMY       SOCHX:  reports that he has never smoked. He has never used smokeless tobacco. He reports that he does not drink alcohol or use drugs..  FH:   Family History   Problem Relation Age of Onset   • Sleep Apnea Father    • Hypertension Father    • Lung Disease Paternal Aunt    • Lung Disease Paternal Grandmother          Physical Exam:  Vitals/ General Appearance:   Weight/BMI: Body mass index is 34.58 kg/m².  /84 (BP Location: Right arm, Patient Position: Sitting, BP Cuff Size: Adult)   Pulse 78   Resp 14   Ht 1.778 m (5' 10\")   Wt 109.3 kg (241 lb)   SpO2 97%   Vitals:    10/19/20 0751   BP: 124/84   BP Location: Right arm   Patient Position: Sitting   BP Cuff Size: Adult   Pulse: 78   Resp: 14   SpO2: " "97%   Weight: 109.3 kg (241 lb)   Height: 1.778 m (5' 10\")       Pt. is alert and oriented to time, place and person. Cooperative and in no apparent distress.       Constitutional: Alert, no distress, well-groomed.  Skin: No rashes in visible areas.  Eye: Round. Conjunctiva clear, lids normal. No icterus.   ENMT: Lips pink without lesions, good dentition, moist mucous membranes. Phonation normal.  Neck: No masses, no thyromegaly. Moves freely without pain.  CV: Pulse as reported by patient  Respiratory: Unlabored respiratory effort, no cough or audible wheeze  Psych: Alert and oriented x3, normal affect and mood.     ASSESSMENT AND PLAN     1. Sleep Apnea: The pathophysiology of sleep anea and the increased risk of cardiovascular morbidity from untreated sleep apnea is discussed in detail with the patient. He  also has seizure disorderwhich can be worsened by her KACY.     He is urged to avoid supine sleep, weight gain and alcoholic beverages since all of these can worsen sleep apnea. He is cautioned against drowsy driving. If He feels sleepy while driving, He must pull over for a break/nap, rather than persist on the road, in the interest of He own safety and that of others on the road.   Plan   - Continue CPAP 11 cm with nasal pillow mask    - supplies are refilled    - compliance download was reviewed and discussed with the pt   - compliance was reinforced     2. Regarding treatment of other past medical problems and general health maintenance,  He is urged to follow up with PCP.    "

## 2020-10-19 NOTE — PATIENT INSTRUCTIONS
"CPAP and BPAP Information  CPAP and BPAP are methods of helping a person breathe with the use of air pressure. CPAP stands for \"continuous positive airway pressure.\" BPAP stands for \"bi-level positive airway pressure.\" In both methods, air is blown through your nose or mouth and into your air passages to help you breathe well.  CPAP and BPAP use different amounts of pressure to blow air. With CPAP, the amount of pressure stays the same while you breathe in and out. With BPAP, the amount of pressure is increased when you breathe in (inhale) so that you can take larger breaths. Your health care provider will recommend whether CPAP or BPAP would be more helpful for you.  Why are CPAP and BPAP treatments used?  CPAP or BPAP can be helpful if you have:  · Sleep apnea.  · Chronic obstructive pulmonary disease (COPD).  · Heart failure.  · Medical conditions that weaken the muscles of the chest including muscular dystrophy, or neurological diseases such as amyotrophic lateral sclerosis (ALS).  · Other problems that cause breathing to be weak, abnormal, or difficult.  CPAP is most commonly used for obstructive sleep apnea (KACY) to keep the airways from collapsing when the muscles relax during sleep.  How is CPAP or BPAP administered?  Both CPAP and BPAP are provided by a small machine with a flexible plastic tube that attaches to a plastic mask. You wear the mask. Air is blown through the mask into your nose or mouth. The amount of pressure that is used to blow the air can be adjusted on the machine. Your health care provider will determine the pressure setting that should be used based on your individual needs.  When should CPAP or BPAP be used?  In most cases, the mask only needs to be worn during sleep. Generally, the mask needs to be worn throughout the night and during any daytime naps. People with certain medical conditions may also need to wear the mask at other times when they are awake. Follow instructions from your " health care provider about when to use the machine.  What are some tips for using the mask?    · Because the mask needs to be snug, some people feel trapped or closed-in (claustrophobic) when first using the mask. If you feel this way, you may need to get used to the mask. One way to do this is by holding the mask loosely over your nose or mouth and then gradually applying the mask more snugly. You can also gradually increase the amount of time that you use the mask.  · Masks are available in various types and sizes. Some fit over your mouth and nose while others fit over just your nose. If your mask does not fit well, talk with your health care provider about getting a different one.  · If you are using a mask that fits over your nose and you tend to breathe through your mouth, a chin strap may be applied to help keep your mouth closed.  · The CPAP and BPAP machines have alarms that may sound if the mask comes off or develops a leak.  · If you have trouble with the mask, it is very important that you talk with your health care provider about finding a way to make the mask easier to tolerate. Do not stop using the mask. Stopping the use of the mask could have a negative impact on your health.  What are some tips for using the machine?  · Place your CPAP or BPAP machine on a secure table or stand near an electrical outlet.  · Know where the on/off switch is located on the machine.  · Follow instructions from your health care provider about how to set the pressure on your machine and when you should use it.  · Do not eat or drink while the CPAP or BPAP machine is on. Food or fluids could get pushed into your lungs by the pressure of the CPAP or BPAP.  · Do not smoke. Tobacco smoke residue can damage the machine.  · For home use, CPAP and BPAP machines can be rented or purchased through home health care companies. Many different brands of machines are available. Renting a machine before purchasing may help you find out  which particular machine works well for you.  · Keep the CPAP or BPAP machine and attachments clean. Ask your health care provider for specific instructions.  Get help right away if:  · You have redness or open areas around your nose or mouth where the mask fits.  · You have trouble using the CPAP or BPAP machine.  · You cannot tolerate wearing the CPAP or BPAP mask.  · You have pain, discomfort, and bloating in your abdomen.  Summary  · CPAP and BPAP are methods of helping a person breathe with the use of air pressure.  · Both CPAP and BPAP are provided by a small machine with a flexible plastic tube that attaches to a plastic mask.  · If you have trouble with the mask, it is very important that you talk with your health care provider about finding a way to make the mask easier to tolerate.  This information is not intended to replace advice given to you by your health care provider. Make sure you discuss any questions you have with your health care provider.  Document Released: 09/15/2005 Document Revised: 04/08/2020 Document Reviewed: 11/06/2017  Elsevier Patient Education © 2020 [employer]

## 2021-01-10 DIAGNOSIS — F84.0 AUTISM SPECTRUM DISORDER: ICD-10-CM

## 2021-01-11 DIAGNOSIS — G40.309 GENERALIZED CONVULSIVE EPILEPSY (HCC): ICD-10-CM

## 2021-01-12 RX ORDER — CITALOPRAM 20 MG/1
TABLET ORAL
Qty: 90 TAB | Refills: 0 | Status: SHIPPED | OUTPATIENT
Start: 2021-01-12 | End: 2021-04-08

## 2021-01-12 RX ORDER — CLONAZEPAM 1 MG/1
TABLET ORAL
Qty: 30 TAB | Refills: 0 | Status: SHIPPED | OUTPATIENT
Start: 2021-01-12 | End: 2021-05-12 | Stop reason: SDUPTHER

## 2021-01-17 DIAGNOSIS — G40.309 GENERALIZED CONVULSIVE EPILEPSY (HCC): ICD-10-CM

## 2021-01-17 DIAGNOSIS — R56.9 SEIZURE (HCC): ICD-10-CM

## 2021-01-18 RX ORDER — LEVETIRACETAM 750 MG/1
TABLET, FILM COATED, EXTENDED RELEASE ORAL
Qty: 60 TAB | Refills: 0 | Status: SHIPPED | OUTPATIENT
Start: 2021-01-18 | End: 2021-02-16

## 2021-01-18 RX ORDER — LAMOTRIGINE 200 MG/1
TABLET, EXTENDED RELEASE ORAL
Qty: 30 TAB | Refills: 0 | Status: SHIPPED | OUTPATIENT
Start: 2021-01-18 | End: 2021-02-16

## 2021-01-19 ENCOUNTER — TELEPHONE (OUTPATIENT)
Dept: NEUROLOGY | Facility: MEDICAL CENTER | Age: 26
End: 2021-01-19

## 2021-01-19 NOTE — TELEPHONE ENCOUNTER
Per pt/ dad pt was going to be out of the clonazepam 1 mg by this Friday. I called [facility] and the pharmacist stated the medication was picked up on 01/12/2020. Per dad there was a mix up. I also authorized 4 additional refills for pt until he see's Dr Cooper in may.

## 2021-02-14 DIAGNOSIS — R56.9 SEIZURE (HCC): ICD-10-CM

## 2021-02-14 DIAGNOSIS — G40.309 GENERALIZED CONVULSIVE EPILEPSY (HCC): ICD-10-CM

## 2021-02-16 RX ORDER — LEVETIRACETAM 750 MG/1
TABLET, FILM COATED, EXTENDED RELEASE ORAL
Qty: 60 TABLET | Refills: 5 | Status: SHIPPED | OUTPATIENT
Start: 2021-02-16 | End: 2021-05-12 | Stop reason: SDUPTHER

## 2021-02-16 RX ORDER — LAMOTRIGINE 200 MG/1
TABLET, EXTENDED RELEASE ORAL
Qty: 30 TABLET | Refills: 5 | Status: SHIPPED | OUTPATIENT
Start: 2021-02-16 | End: 2021-05-12 | Stop reason: SDUPTHER

## 2021-02-16 NOTE — TELEPHONE ENCOUNTER
Received request via: Pharmacy    Was the patient seen in the last year in this department? No     Does the patient have an active prescription (recently filled or refills available) for medication(s) requested? Yes.   Next appt  05/12/21

## 2021-04-07 DIAGNOSIS — F84.0 AUTISM SPECTRUM DISORDER: ICD-10-CM

## 2021-04-08 RX ORDER — CITALOPRAM 20 MG/1
TABLET ORAL
Qty: 90 TABLET | Refills: 3 | Status: SHIPPED | OUTPATIENT
Start: 2021-04-08 | End: 2021-05-12 | Stop reason: SDUPTHER

## 2021-05-12 ENCOUNTER — OFFICE VISIT (OUTPATIENT)
Dept: NEUROLOGY | Facility: MEDICAL CENTER | Age: 26
End: 2021-05-12
Attending: PSYCHIATRY & NEUROLOGY
Payer: COMMERCIAL

## 2021-05-12 VITALS
WEIGHT: 246.25 LBS | DIASTOLIC BLOOD PRESSURE: 60 MMHG | RESPIRATION RATE: 12 BRPM | BODY MASS INDEX: 35.33 KG/M2 | SYSTOLIC BLOOD PRESSURE: 110 MMHG | TEMPERATURE: 98.4 F | OXYGEN SATURATION: 92 % | HEART RATE: 82 BPM

## 2021-05-12 DIAGNOSIS — R56.9 SEIZURE (HCC): ICD-10-CM

## 2021-05-12 DIAGNOSIS — Z13.31 SCREENING FOR DEPRESSION: ICD-10-CM

## 2021-05-12 DIAGNOSIS — E66.9 OBESITY (BMI 30-39.9): ICD-10-CM

## 2021-05-12 DIAGNOSIS — F41.9 ANXIETY DISORDER, UNSPECIFIED TYPE: ICD-10-CM

## 2021-05-12 DIAGNOSIS — G47.10 HYPERSOMNIA: ICD-10-CM

## 2021-05-12 DIAGNOSIS — G40.309 GENERALIZED CONVULSIVE EPILEPSY (HCC): ICD-10-CM

## 2021-05-12 DIAGNOSIS — F84.0 AUTISM SPECTRUM DISORDER: ICD-10-CM

## 2021-05-12 DIAGNOSIS — G47.33 OBSTRUCTIVE SLEEP APNEA: ICD-10-CM

## 2021-05-12 DIAGNOSIS — E55.9 VITAMIN D DEFICIENCY: ICD-10-CM

## 2021-05-12 DIAGNOSIS — R53.83 MALAISE AND FATIGUE: ICD-10-CM

## 2021-05-12 DIAGNOSIS — R53.81 MALAISE AND FATIGUE: ICD-10-CM

## 2021-05-12 DIAGNOSIS — Z91.199 NONCOMPLIANCE: ICD-10-CM

## 2021-05-12 PROCEDURE — 99215 OFFICE O/P EST HI 40 MIN: CPT | Performed by: PSYCHIATRY & NEUROLOGY

## 2021-05-12 PROCEDURE — 99211 OFF/OP EST MAY X REQ PHY/QHP: CPT | Performed by: PSYCHIATRY & NEUROLOGY

## 2021-05-12 RX ORDER — LORAZEPAM 1 MG/1
1 TABLET ORAL EVERY 4 HOURS PRN
COMMUNITY
End: 2022-07-15 | Stop reason: SDUPTHER

## 2021-05-12 RX ORDER — LEVETIRACETAM 750 MG/1
2 TABLET, FILM COATED, EXTENDED RELEASE ORAL EVERY EVENING
Qty: 180 TABLET | Refills: 3 | Status: SHIPPED | OUTPATIENT
Start: 2021-05-12 | End: 2021-11-17 | Stop reason: SDUPTHER

## 2021-05-12 RX ORDER — CITALOPRAM 20 MG/1
TABLET ORAL
Qty: 90 TABLET | Refills: 3 | Status: SHIPPED | OUTPATIENT
Start: 2021-05-12 | End: 2022-04-18

## 2021-05-12 RX ORDER — CLONAZEPAM 1 MG/1
TABLET ORAL
Qty: 30 TABLET | Refills: 5 | Status: SHIPPED | OUTPATIENT
Start: 2021-05-12 | End: 2021-11-22

## 2021-05-12 RX ORDER — LAMOTRIGINE 200 MG/1
TABLET, EXTENDED RELEASE ORAL
Qty: 90 TABLET | Refills: 3 | Status: SHIPPED | OUTPATIENT
Start: 2021-05-12 | End: 2021-11-17 | Stop reason: SDUPTHER

## 2021-05-12 ASSESSMENT — FIBROSIS 4 INDEX: FIB4 SCORE: 0.41

## 2021-05-12 NOTE — PROGRESS NOTES
Chief Complaint   Patient presents with   • Follow-Up       Problem List Items Addressed This Visit     Autism spectrum disorder    Relevant Medications    citalopram (CELEXA) 20 MG Tab    Other Relevant Orders    CBC WITH DIFFERENTIAL    Comp Metabolic Panel    VITAMIN D,25 HYDROXY    TSH    HEMOGLOBIN A1C    Anxiety disorder    Relevant Medications    LORazepam (ATIVAN) 1 MG Tab    citalopram (CELEXA) 20 MG Tab    Other Relevant Orders    CBC WITH DIFFERENTIAL    Comp Metabolic Panel    VITAMIN D,25 HYDROXY    TSH    HEMOGLOBIN A1C    Obesity (BMI 30-39.9)    Relevant Orders    CBC WITH DIFFERENTIAL    Comp Metabolic Panel    VITAMIN D,25 HYDROXY    TSH    HEMOGLOBIN A1C      Other Visit Diagnoses     Generalized convulsive epilepsy (HCC)        Relevant Medications    LamoTRIgine 200 MG TABLET SR 24 HR    clonazePAM (KLONOPIN) 1 MG Tab    Levetiracetam 750 MG TABLET SR 24 HR    Other Relevant Orders    CBC WITH DIFFERENTIAL    Comp Metabolic Panel    VITAMIN D,25 HYDROXY    TSH    HEMOGLOBIN A1C    Screening for depression        Relevant Orders    CBC WITH DIFFERENTIAL    Comp Metabolic Panel    VITAMIN D,25 HYDROXY    TSH    HEMOGLOBIN A1C    Malaise and fatigue        Relevant Orders    CBC WITH DIFFERENTIAL    Comp Metabolic Panel    VITAMIN D,25 HYDROXY    TSH    HEMOGLOBIN A1C    Hypersomnia        Relevant Orders    CBC WITH DIFFERENTIAL    Comp Metabolic Panel    VITAMIN D,25 HYDROXY    TSH    HEMOGLOBIN A1C    Obstructive sleep apnea        Relevant Orders    CBC WITH DIFFERENTIAL    Comp Metabolic Panel    VITAMIN D,25 HYDROXY    TSH    HEMOGLOBIN A1C    Vitamin D deficiency        Relevant Orders    CBC WITH DIFFERENTIAL    Comp Metabolic Panel    VITAMIN D,25 HYDROXY    TSH    HEMOGLOBIN A1C    Seizure (HCC)        Relevant Medications    LamoTRIgine 200 MG TABLET SR 24 HR    clonazePAM (KLONOPIN) 1 MG Tab    Levetiracetam 750 MG TABLET SR 24 HR    Other Relevant Orders    CBC WITH DIFFERENTIAL     Comp Metabolic Panel    VITAMIN D,25 HYDROXY    TSH    HEMOGLOBIN A1C    Noncompliance              Interim history:  Braydon Sotelo returns to the clinic in follow-up, in the company of his mother.  The patient was lost to follow-up in the office.  His last seizure was in October 2020, due to not taking his seizure medications for 2 days in a row, apparently he forgot.  He is using a pillbox, and appears that his compliance has improved.  He has no complaints during today's appointment, history of depression and anxiety, this is improved with Celexa, would like to continue.  Is not depressed, suicidal or homicidal.  Has not had any recent blood work.  Denies any other neurological concerns.  Currently on Lamictal, clonazepam and levetiracetam, tolerating well, without any side effects, particularly no rash.    Sleep apnea, uses CPAP intermittently.    Past medical history:   Past Medical History:   Diagnosis Date   • Autism    • Autism spectrum disorder    • Seizure (HCC)     since 2013       Past surgical history:   Past Surgical History:   Procedure Laterality Date   • OTHER  1/10/2014    wisdom teeth extraction   • TONSILLECTOMY     • TONSILLECTOMY AND ADENOIDECTOMY         Family history:   Family History   Problem Relation Age of Onset   • Sleep Apnea Father    • Hypertension Father    • Lung Disease Paternal Aunt    • Lung Disease Paternal Grandmother        Social history:   Social History     Socioeconomic History   • Marital status: Single     Spouse name: Not on file   • Number of children: Not on file   • Years of education: Not on file   • Highest education level: Not on file   Occupational History   • Not on file   Tobacco Use   • Smoking status: Never Smoker   • Smokeless tobacco: Never Used   Vaping Use   • Vaping Use: Never used   Substance and Sexual Activity   • Alcohol use: No     Alcohol/week: 0.0 oz   • Drug use: No   • Sexual activity: Not on file   Other Topics Concern   • Not on file    Social History Narrative   • Not on file     Social Determinants of Health     Financial Resource Strain:    • Difficulty of Paying Living Expenses:    Food Insecurity:    • Worried About Running Out of Food in the Last Year:    • Ran Out of Food in the Last Year:    Transportation Needs:    • Lack of Transportation (Medical):    • Lack of Transportation (Non-Medical):    Physical Activity:    • Days of Exercise per Week:    • Minutes of Exercise per Session:    Stress:    • Feeling of Stress :    Social Connections:    • Frequency of Communication with Friends and Family:    • Frequency of Social Gatherings with Friends and Family:    • Attends [employer] Services:    • Active Member of Clubs or Organizations:    • Attends Club or Organization Meetings:    • Marital Status:    Intimate Partner Violence:    • Fear of Current or Ex-Partner:    • Emotionally Abused:    • Physically Abused:    • Sexually Abused:        Current medications:   Current Outpatient Medications   Medication   • LORazepam (ATIVAN) 1 MG Tab   • LamoTRIgine 200 MG TABLET SR 24 HR   • clonazePAM (KLONOPIN) 1 MG Tab   • Levetiracetam 750 MG TABLET SR 24 HR   • citalopram (CELEXA) 20 MG Tab     No current facility-administered medications for this visit.       Medication Allergy:  No Known Allergies      Review of systems:   As reviewed in today's history.    Physical examination:   Vitals:    05/12/21 1051   BP: 110/60   Pulse: 82   Resp: 12   Temp: 36.9 °C (98.4 °F)   TempSrc: Temporal   SpO2: 92%   Weight: 112 kg (246 lb 4.1 oz)     General: Patient in no acute distress, pleasant and cooperative.  Obese.  HEENT: Normocephalic, no signs of acute trauma.   Neck: supple, no meningeal signs or carotid bruits. There is normal range of motion. No tenderness on exam.   Chest: clear to auscultation. No cough.   CV: RRR, no murmurs.   Skin: no signs of acute rashes or trauma.   Musculoskeletal: joints exhibit full range of motion, without any pain to  palpation. There are no signs of joint or muscle swelling. There is no tenderness to deep palpation of muscles.   Psychiatric: No hallucinatory behavior. Denies symptoms of depression or suicidal ideation. Mood and affect appear normal on exam.     NEUROLOGICAL EXAM:   Mental status, orientation: Awake, alert and fully oriented.   Speech and language: speech is clear and fluent. The patient is able to name, repeat and comprehend.   Memory: There is intact recollection of recent and remote events.   Cranial nerve exam: Pupils are 3-4 mm bilaterally and equally reactive to light and accommodation. Visual fields are intact by confrontation. There is no nystagmus on primary or secondary gaze. Intact full EOM in all directions of gaze. Face appears symmetric. Sensation in the face is intact to light touch. Uvula is midline. Palate elevates symmetrically. Tongue is midline and without any signs of tongue biting or fasciculations.Sternocleidomastoid muscles exhibit is normal strength bilaterally. Shoulder shrug is intact bilaterally.   Motor exam: Strength is 5/5 in all extremities. Tone is normal. No abnormal movements were seen on exam.   Sensory exam reveals normal sense of light touch in all extremities.   Deep tendon reflexes:  2+ throughout. Plantar responses are flexor. There is no clonus.   Coordination: shows a normal finger-nose-finger. Normal rapidly alternating movements.   Gait: The patient was able to get up from seated position on first attempt without requiring assistance. Found to be steady when walking. Movements were fluid with normal arm swing. The patient was able to turn without difficulties or tendency to fall.         ANCILLARY DATA REVIEWED:       Lab Data Review:  Reviewed in chart.    Records reviewed:   Chart reviewed.    Imaging:   MRI brain wo, 11/23/13:  Normal.     EEG:  VEEG, 10/11/18:  Normal.      48-hour ambulatory EEG, 5/28/2019:  INTERPRETATION:   This is a normal 48 hours ambulatory  electroencephalogram   recording in the awake, drowsy and sleep state.  No events were   captured during the study. Clinical correlation is recommended.  Note: A normal electroencephalogram does not rule out epilepsy.        ASSESSMENT AND PLAN:  1. Generalized convulsive epilepsy (HCC)  - LamoTRIgine 200 MG TABLET SR 24 HR; Take 1 tablet by mouth once daily  Dispense: 90 tablet; Refill: 3  - clonazePAM (KLONOPIN) 1 MG Tab; TAKE 1 TABLET BY MOUTH DAILY AT BEDTIME  Dispense: 30 tablet; Refill: 5  - CBC WITH DIFFERENTIAL; Future  - Comp Metabolic Panel; Future  - VITAMIN D,25 HYDROXY; Future  - TSH; Future  - HEMOGLOBIN A1C; Future    2. Autism spectrum disorder  - citalopram (CELEXA) 20 MG Tab; Take 1 tablet by mouth daily  Dispense: 90 tablet; Refill: 3  - CBC WITH DIFFERENTIAL; Future  - Comp Metabolic Panel; Future  - VITAMIN D,25 HYDROXY; Future  - TSH; Future  - HEMOGLOBIN A1C; Future    3. Screening for depression  - CBC WITH DIFFERENTIAL; Future  - Comp Metabolic Panel; Future  - VITAMIN D,25 HYDROXY; Future  - TSH; Future  - HEMOGLOBIN A1C; Future    4. Malaise and fatigue  - CBC WITH DIFFERENTIAL; Future  - Comp Metabolic Panel; Future  - VITAMIN D,25 HYDROXY; Future  - TSH; Future  - HEMOGLOBIN A1C; Future    5. Hypersomnia  - CBC WITH DIFFERENTIAL; Future  - Comp Metabolic Panel; Future  - VITAMIN D,25 HYDROXY; Future  - TSH; Future  - HEMOGLOBIN A1C; Future    6. Obesity (BMI 30-39.9)  - CBC WITH DIFFERENTIAL; Future  - Comp Metabolic Panel; Future  - VITAMIN D,25 HYDROXY; Future  - TSH; Future  - HEMOGLOBIN A1C; Future    7. Obstructive sleep apnea  - CBC WITH DIFFERENTIAL; Future  - Comp Metabolic Panel; Future  - VITAMIN D,25 HYDROXY; Future  - TSH; Future  - HEMOGLOBIN A1C; Future    8. Vitamin D deficiency  - CBC WITH DIFFERENTIAL; Future  - Comp Metabolic Panel; Future  - VITAMIN D,25 HYDROXY; Future  - TSH; Future  - HEMOGLOBIN A1C; Future    9. Anxiety disorder, unspecified type  - CBC WITH  DIFFERENTIAL; Future  - Comp Metabolic Panel; Future  - VITAMIN D,25 HYDROXY; Future  - TSH; Future  - HEMOGLOBIN A1C; Future    10. Seizure (HCC)  - Levetiracetam 750 MG TABLET SR 24 HR; Take 2 Tablets by mouth every evening.  Dispense: 180 tablet; Refill: 3  - CBC WITH DIFFERENTIAL; Future  - Comp Metabolic Panel; Future  - VITAMIN D,25 HYDROXY; Future  - TSH; Future  - HEMOGLOBIN A1C; Future    11. Noncompliance    Other orders  - LORazepam (ATIVAN) 1 MG Tab; Take 1 mg by mouth every four hours as needed for Anxiety.       CLINICAL DISCUSSION:   The patient and family initially provided a history of uncontrolled epilepsy.  Seizures described as generalized tonic-clonic. The patient denies any clear aura.  Longest without a seizure was six months in the past, had weekly spells when I saw him first, however no further seizures from April 2019, 2 January 2020.  Unfortunately had a breakthrough seizure in October 2020, but this was due to noncompliance, stopped medication for 2 days, apparently forgot.  Compliance was discussed at length during today's appointment, in addition to risk for further seizures, status epilepticus, and even sudden unexplained death in epilepsy.  Patient should be 100% compliant.  He was also lost to follow-up from the office, and he and his mother understand that he should come to the appointments.    History of some mood issues reported by family initially, however this appears to be improved and not a regular problem, they do not want to make any changes at this time.      Current antiepileptic drugs regimen:   Lamictal  mg daily in the morning,  levetiracetam ER 1500 mg p.o. nightly.    Clonazepam 1 mg p.o. nightly.       The patient is currently denying any side effects.  His mood is stable.  His 48-hour EEG was normal.  I recommended that he is admitted to the epilepsy monitoring unit to further characterize his spells, however both patient and father decided to wait at this  point.      Remain on no driving for now.  This was discussed again during today's visit.    Discussed diet and exercise at length.      On a CPAP for obstructive sleep apnea.  The patient is obese.  Recommended weight loss.  He will continue to follow with pulmonology.  Discussed compliance with CPAP.         FOLLOW-UP:   Return in about 6 months (around 11/12/2021).      EDUCATION AND COUNSELING:  -Education was provided to the patient and/or family regarding diagnosis and prognosis. The chronic and unpredictable nature of the condition were discussed. There is increased risk for additional events, which may carry potential for significant injuries and death. Discussed frequent seizure triggers: sleep deprivation, medication non-compliance, use of illegal drugs/alcohol, stress, and others.   -We reviewed in detail the current antiepileptic regimen. Potential side effects of antiepileptics were discussed at length, including but no limited to: hypersensitivity reactions (rash and others, some of which can be fatal), visual field changes (some of which may be irreversible), glaucoma, diplopia, kidney stones, osteopenia/osteoporosis/bone fractures, hyperthermia/anhydrosis, hyponatremia, tremors/abnormal movements, ataxia, dizziness, fatigue, increased risk for falls, risk for cardiac arrhythmias and syncope, weight changes, hair loss, gastrointestinal side effects(hepatitis, pancreatitis, gastritis, ulcers, gingival hypertrophy/bleeding, drowsiness, sedation, memory loss, trouble finding words, anxiety/nervousness, increased risk for suicide, increased risk for depression, and psychosis.   -We also reviewed drug-drug interactions and their potential effect on seizure control and medication side effects.    -Recommend chronic vitamin D supplementation and regular exercise (if not contraindicated).   -Patient/family educated on risk for SUDEP (Sudden Death in Epilepsy). Counseling was provided on the importance of  strict medication and follow up compliance. The patient/family understand the risks associated with non-adherence with the medical plan as outlined, including but not limited to an increased risk for breakthrough seizures, which may contribute to injuries, disability, status epilepticus, and even death.   -Counseling was also provided on potential effects of alcohol and other drugs, which may lower seizure threshold and/or affect the metabolism of antiepileptic drugs. We recommend avoidance of alcohol and illegal drugs.  Denies use.  -Avoid sleep deprivation.   -We extensively discussed the aspects related to safety in drivers who suffer from epilepsy. The patient is encourage to report to the Division of Motor Vehicles of any condition and/or spells related to confusion, disorientation, and/or loss of awareness and/or loss of consciousness; as these may pose a safety issue if they occur while operating a motor vehicle. The patient and/or family are ultimately responsible for exercising caution and abiding to regulations in place. The patient understands that only the Department of Motor Vehicles (DMV) is able to authorize an individual to drive, even after medical clearance, DMV clearance must be obtained.  Does not drive.  -Other seizure precautions were discussed at length, including no diving, no skydiving, no climbing or exposure to unprotected heights, no unsupervised swimming, no Jacuzzi or bathing in bathtubs or deep bodies of water. The patient/family have been advised about risks for operating any machinery while suffering from seizures / syncope / epilepsy and/or while taking antiepileptic drugs.   -The patient understands and agrees that due to the complexity of his/her diagnosis, results of any testing and further recommendations will typically be discussed/made during a face to face encounter in my office. The patient and/or family further understands it is their responsibility to keep proper follow  up.     Patient/family agree with plan, as outlined.         Mando Cooper MD   Epilepsy and Neurodiagnostics.   Clinical  of Neurology [facility].   Diplomate in Neurology, Epilepsy, and Electrodiagnostic Medicine.   Office: [phone]  Fax: [phone]      BILLING DOCUMENTATION:     I have performed physical exam and reviewed and updated ROS and plan today 5/12/2021. In review of that note, there are no new changes except as documented above.     Counseling:  I spent greater than 50% time face-to-face time of a total of 40 minutes visit. Over 50% of the time of the visit today was spent on counseling and or coordination of care wtih the patient and/or family, with greater than 50% of the total discussing my assessment and plan as stated above.

## 2021-05-14 ENCOUNTER — HOSPITAL ENCOUNTER (OUTPATIENT)
Dept: LAB | Facility: MEDICAL CENTER | Age: 26
End: 2021-05-14
Attending: PSYCHIATRY & NEUROLOGY
Payer: COMMERCIAL

## 2021-05-14 DIAGNOSIS — E66.9 OBESITY (BMI 30-39.9): ICD-10-CM

## 2021-05-14 DIAGNOSIS — Z13.31 SCREENING FOR DEPRESSION: ICD-10-CM

## 2021-05-14 DIAGNOSIS — R53.81 MALAISE AND FATIGUE: ICD-10-CM

## 2021-05-14 DIAGNOSIS — R53.83 MALAISE AND FATIGUE: ICD-10-CM

## 2021-05-14 DIAGNOSIS — G47.10 HYPERSOMNIA: ICD-10-CM

## 2021-05-14 DIAGNOSIS — F41.9 ANXIETY DISORDER, UNSPECIFIED TYPE: ICD-10-CM

## 2021-05-14 DIAGNOSIS — E55.9 VITAMIN D DEFICIENCY: ICD-10-CM

## 2021-05-14 DIAGNOSIS — G40.309 GENERALIZED CONVULSIVE EPILEPSY (HCC): ICD-10-CM

## 2021-05-14 DIAGNOSIS — F84.0 AUTISM SPECTRUM DISORDER: ICD-10-CM

## 2021-05-14 DIAGNOSIS — R56.9 SEIZURE (HCC): ICD-10-CM

## 2021-05-14 DIAGNOSIS — G47.33 OBSTRUCTIVE SLEEP APNEA: ICD-10-CM

## 2021-05-14 LAB
ALBUMIN SERPL BCP-MCNC: 4.6 G/DL (ref 3.2–4.9)
ALBUMIN/GLOB SERPL: 1.8 G/DL
ALP SERPL-CCNC: 85 U/L (ref 30–99)
ALT SERPL-CCNC: 35 U/L (ref 2–50)
ANION GAP SERPL CALC-SCNC: 13 MMOL/L (ref 7–16)
AST SERPL-CCNC: 21 U/L (ref 12–45)
BASOPHILS # BLD AUTO: 0.5 % (ref 0–1.8)
BASOPHILS # BLD: 0.04 K/UL (ref 0–0.12)
BILIRUB SERPL-MCNC: 0.2 MG/DL (ref 0.1–1.5)
BUN SERPL-MCNC: 18 MG/DL (ref 8–22)
CALCIUM SERPL-MCNC: 9.4 MG/DL (ref 8.4–10.2)
CHLORIDE SERPL-SCNC: 104 MMOL/L (ref 96–112)
CO2 SERPL-SCNC: 24 MMOL/L (ref 20–33)
CREAT SERPL-MCNC: 1.03 MG/DL (ref 0.5–1.4)
EOSINOPHIL # BLD AUTO: 0.26 K/UL (ref 0–0.51)
EOSINOPHIL NFR BLD: 3 % (ref 0–6.9)
ERYTHROCYTE [DISTWIDTH] IN BLOOD BY AUTOMATED COUNT: 38.4 FL (ref 35.9–50)
EST. AVERAGE GLUCOSE BLD GHB EST-MCNC: 108 MG/DL
FASTING STATUS PATIENT QL REPORTED: NORMAL
GLOBULIN SER CALC-MCNC: 2.5 G/DL (ref 1.9–3.5)
GLUCOSE SERPL-MCNC: 89 MG/DL (ref 65–99)
HBA1C MFR BLD: 5.4 % (ref 4–5.6)
HCT VFR BLD AUTO: 50.8 % (ref 42–52)
HGB BLD-MCNC: 16.9 G/DL (ref 14–18)
IMM GRANULOCYTES # BLD AUTO: 0.03 K/UL (ref 0–0.11)
IMM GRANULOCYTES NFR BLD AUTO: 0.4 % (ref 0–0.9)
LYMPHOCYTES # BLD AUTO: 2.77 K/UL (ref 1–4.8)
LYMPHOCYTES NFR BLD: 32.5 % (ref 22–41)
MCH RBC QN AUTO: 29.1 PG (ref 27–33)
MCHC RBC AUTO-ENTMCNC: 33.3 G/DL (ref 33.7–35.3)
MCV RBC AUTO: 87.6 FL (ref 81.4–97.8)
MONOCYTES # BLD AUTO: 0.63 K/UL (ref 0–0.85)
MONOCYTES NFR BLD AUTO: 7.4 % (ref 0–13.4)
NEUTROPHILS # BLD AUTO: 4.8 K/UL (ref 1.82–7.42)
NEUTROPHILS NFR BLD: 56.2 % (ref 44–72)
NRBC # BLD AUTO: 0 K/UL
NRBC BLD-RTO: 0 /100 WBC
PLATELET # BLD AUTO: 225 K/UL (ref 164–446)
PMV BLD AUTO: 10 FL (ref 9–12.9)
POTASSIUM SERPL-SCNC: 4.3 MMOL/L (ref 3.6–5.5)
PROT SERPL-MCNC: 7.1 G/DL (ref 6–8.2)
RBC # BLD AUTO: 5.8 M/UL (ref 4.7–6.1)
SODIUM SERPL-SCNC: 141 MMOL/L (ref 135–145)
TSH SERPL DL<=0.005 MIU/L-ACNC: 3.95 UIU/ML (ref 0.38–5.33)
WBC # BLD AUTO: 8.5 K/UL (ref 4.8–10.8)

## 2021-05-14 PROCEDURE — 84443 ASSAY THYROID STIM HORMONE: CPT

## 2021-05-14 PROCEDURE — 36415 COLL VENOUS BLD VENIPUNCTURE: CPT

## 2021-05-14 PROCEDURE — 80053 COMPREHEN METABOLIC PANEL: CPT

## 2021-05-14 PROCEDURE — 83036 HEMOGLOBIN GLYCOSYLATED A1C: CPT

## 2021-05-14 PROCEDURE — 82306 VITAMIN D 25 HYDROXY: CPT

## 2021-05-14 PROCEDURE — 85025 COMPLETE CBC W/AUTO DIFF WBC: CPT

## 2021-05-16 LAB — 25(OH)D3 SERPL-MCNC: 17 NG/ML (ref 30–80)

## 2021-05-16 RX ORDER — ERGOCALCIFEROL 1.25 MG/1
50000 CAPSULE ORAL
Qty: 4 CAPSULE | Refills: 5 | Status: SHIPPED | OUTPATIENT
Start: 2021-05-16 | End: 2021-10-25

## 2021-08-13 ENCOUNTER — HOSPITAL ENCOUNTER (OUTPATIENT)
Facility: MEDICAL CENTER | Age: 26
End: 2021-08-13
Attending: FAMILY MEDICINE
Payer: COMMERCIAL

## 2021-08-13 ENCOUNTER — OFFICE VISIT (OUTPATIENT)
Dept: MEDICAL GROUP | Age: 26
End: 2021-08-13
Payer: COMMERCIAL

## 2021-08-13 VITALS
OXYGEN SATURATION: 93 % | WEIGHT: 257.4 LBS | DIASTOLIC BLOOD PRESSURE: 68 MMHG | HEART RATE: 88 BPM | TEMPERATURE: 98.6 F | BODY MASS INDEX: 36.85 KG/M2 | HEIGHT: 70 IN | SYSTOLIC BLOOD PRESSURE: 112 MMHG

## 2021-08-13 DIAGNOSIS — R05.9 COUGH: ICD-10-CM

## 2021-08-13 DIAGNOSIS — J06.9 UPPER RESPIRATORY TRACT INFECTION, UNSPECIFIED TYPE: ICD-10-CM

## 2021-08-13 LAB
FLUAV+FLUBV AG SPEC QL IA: NEGATIVE
INT CON NEG: NEGATIVE
INT CON NEG: NEGATIVE
INT CON POS: POSITIVE
INT CON POS: POSITIVE
S PYO AG THROAT QL: NEGATIVE

## 2021-08-13 PROCEDURE — 87804 INFLUENZA ASSAY W/OPTIC: CPT | Performed by: FAMILY MEDICINE

## 2021-08-13 PROCEDURE — U0005 INFEC AGEN DETEC AMPLI PROBE: HCPCS

## 2021-08-13 PROCEDURE — 87880 STREP A ASSAY W/OPTIC: CPT | Performed by: FAMILY MEDICINE

## 2021-08-13 PROCEDURE — U0003 INFECTIOUS AGENT DETECTION BY NUCLEIC ACID (DNA OR RNA); SEVERE ACUTE RESPIRATORY SYNDROME CORONAVIRUS 2 (SARS-COV-2) (CORONAVIRUS DISEASE [COVID-19]), AMPLIFIED PROBE TECHNIQUE, MAKING USE OF HIGH THROUGHPUT TECHNOLOGIES AS DESCRIBED BY CMS-2020-01-R: HCPCS

## 2021-08-13 PROCEDURE — 99213 OFFICE O/P EST LOW 20 MIN: CPT | Performed by: FAMILY MEDICINE

## 2021-08-13 RX ORDER — PREDNISONE 20 MG/1
TABLET ORAL
Qty: 12 TABLET | Refills: 0 | Status: SHIPPED | OUTPATIENT
Start: 2021-08-13 | End: 2021-11-17

## 2021-08-13 RX ORDER — LORATADINE 10 MG/1
10 TABLET ORAL DAILY
Qty: 30 TABLET | Refills: 2 | Status: SHIPPED | OUTPATIENT
Start: 2021-08-13 | End: 2023-10-31

## 2021-08-13 ASSESSMENT — FIBROSIS 4 INDEX: FIB4 SCORE: 0.39

## 2021-08-13 ASSESSMENT — PAIN SCALES - GENERAL: PAINLEVEL: NO PAIN

## 2021-08-13 NOTE — PROGRESS NOTES
This medical record contains text that has been entered with the assistance of computer voice recognition and dictation software.  Therefore, it may contain unintended errors in text, spelling, punctuation, or grammar      Chief Complaint   Patient presents with   • Cough         Braydon Sotelo is a 26 y.o. male here evaluation and management of: cough      HPI:     1. Cough  2. Upper respiratory tract infection, unspecified type  NEW UNDIAGNOSED PROBLEM    Braydon is a very pleasant 26-year-old male who presents to clinic with his father Russ complaining of a cough.  He states this started when the smoke outside.,  The cough is associated with no production of sputum, no fevers chills night sweats he does have sometimes a mild sore throat.  He denies any change in taste or smell, no shortness of breath no difficulty breathing, no dyspnea exertion.  Denies any changes in vision, no numbness or tingling no weakness in extremity no headache.  It has decreased his to appetite this recent experience.    Current medicines (including changes today)  Current Outpatient Medications   Medication Sig Dispense Refill   • loratadine (CLARITIN) 10 MG Tab Take 1 Tablet by mouth every day. 30 Tablet 2   • predniSONE (DELTASONE) 20 MG Tab Take 3 tabs po for 2 days then 2 tabs for 2 days then 1 for 2 days 12 Tablet 0   • fluticasone-salmeterol (ADVAIR) 100-50 MCG/DOSE AEROSOL POWDER, BREATH ACTIVATED Inhale 1 Puff every 12 hours. 60 Each 2   • vitamin D, Ergocalciferol, (DRISDOL) 1.25 MG (58252 UT) Cap capsule Take 1 capsule by mouth every 7 days. 4 capsule 5   • LORazepam (ATIVAN) 1 MG Tab Take 1 mg by mouth every four hours as needed for Anxiety.     • LamoTRIgine 200 MG TABLET SR 24 HR Take 1 tablet by mouth once daily 90 tablet 3   • clonazePAM (KLONOPIN) 1 MG Tab TAKE 1 TABLET BY MOUTH DAILY AT BEDTIME 30 tablet 5   • Levetiracetam 750 MG TABLET SR 24 HR Take 2 Tablets by mouth every evening. 180 tablet 3   • citalopram  "(CELEXA) 20 MG Tab Take 1 tablet by mouth daily 90 tablet 3     No current facility-administered medications for this visit.     He  has a past medical history of Autism, Autism spectrum disorder, and Seizure (HCC).  He  has a past surgical history that includes tonsillectomy and adenoidectomy; other (1/10/2014); and tonsillectomy.  Social History     Tobacco Use   • Smoking status: Never Smoker   • Smokeless tobacco: Never Used   Vaping Use   • Vaping Use: Never used   Substance Use Topics   • Alcohol use: No     Alcohol/week: 0.0 oz   • Drug use: No     Social History     Social History Narrative   • Not on file     Family History   Problem Relation Age of Onset   • No Known Problems Mother    • Sleep Apnea Father    • Hypertension Father    • Obesity Father    • Lung Disease Paternal Aunt    • Lung Disease Paternal Grandmother    • No Known Problems Maternal Grandmother    • Diabetes Maternal Grandfather    • Hyperlipidemia Paternal Grandfather      Family Status   Relation Name Status   • Mo  Alive   • Fa  Alive   • PAunt     • PGMo     • MGMo     • MGFa     • PGFa           ROS    The pertinent  ROS findings can be seen in the HPI above.     All other systems reviewed and are negative     Objective:     /68 (BP Location: Left arm, Patient Position: Sitting, BP Cuff Size: Adult long)   Pulse 88   Temp 37 °C (98.6 °F) (Temporal)   Ht 1.778 m (5' 10\")   Wt 117 kg (257 lb 6.4 oz)   SpO2 93%  Body mass index is 36.93 kg/m².      Physical Exam:    Constitutional: Alert, no distress.  Skin: No suspicious lesions  Eye: Equal, round and reactive, conjunctiva clear, lids normal.  ENMT: Lips without lesions, good dentition, oropharynx clear.  Neck: Trachea midline, no masses, no thyromegaly. No cervical or supraclavicular lymphadenopathy.  Respiratory: Unlabored respiratory effort, lungs clear to auscultation, no wheezes, no ronchi.  Cardiovascular: Normal S1, S2, no " murmur, no edema  Abdomen: Soft, non-tender, no masses, no hepatosplenomegaly.        Assessment and Plan:   The following treatment plan was discussed      1. Cough  2. Upper respiratory tract infection, unspecified type  We will screen for Covid  This could be a upper respiratory tract infection or asthma from the recent pollution from smoke  Nevertheless he is hemodynamically stable there is no clinical indication for imaging  He may safely be treated as an outpatient    - loratadine (CLARITIN) 10 MG Tab; Take 1 Tablet by mouth every day.  Dispense: 30 Tablet; Refill: 2  - predniSONE (DELTASONE) 20 MG Tab; Take 3 tabs po for 2 days then 2 tabs for 2 days then 1 for 2 days  Dispense: 12 Tablet; Refill: 0  - fluticasone-salmeterol (ADVAIR) 100-50 MCG/DOSE AEROSOL POWDER, BREATH ACTIVATED; Inhale 1 Puff every 12 hours.  Dispense: 60 Each; Refill: 2      - COVID/SARS CoV-2 PCR; Future  - Influenza By PCR, A/B/H1N1; Future  - GRP B STREP, BY PCR (DIRECT); Future  - loratadine (CLARITIN) 10 MG Tab; Take 1 Tablet by mouth every day.  Dispense: 30 Tablet; Refill: 2  - predniSONE (DELTASONE) 20 MG Tab; Take 3 tabs po for 2 days then 2 tabs for 2 days then 1 for 2 days  Dispense: 12 Tablet; Refill: 0  - fluticasone-salmeterol (ADVAIR) 100-50 MCG/DOSE AEROSOL POWDER, BREATH ACTIVATED; Inhale 1 Puff every 12 hours.  Dispense: 60 Each; Refill: 2            Instructed to Follow up in clinic or ER for worsening symptoms, difficulty breathing, lack of expected recovery, or should new symptoms or problems arise.    Followup: Return in about 6 months (around 2/13/2022) for Reevaluation, labs.

## 2021-08-13 NOTE — RESULT ENCOUNTER NOTE
I discussed results and plan with patient, who stated understanding.       Rajat Burciaga MD  [employer], [address] [address]

## 2021-08-14 DIAGNOSIS — J06.9 UPPER RESPIRATORY TRACT INFECTION, UNSPECIFIED TYPE: ICD-10-CM

## 2021-08-14 LAB
COVID ORDER STATUS COVID19: NORMAL
SARS-COV-2 RNA RESP QL NAA+PROBE: NOTDETECTED
SPECIMEN SOURCE: NORMAL

## 2021-09-16 DIAGNOSIS — J06.9 UPPER RESPIRATORY TRACT INFECTION, UNSPECIFIED TYPE: ICD-10-CM

## 2021-09-16 DIAGNOSIS — R05.9 COUGH: ICD-10-CM

## 2021-09-22 ENCOUNTER — HOSPITAL ENCOUNTER (OUTPATIENT)
Facility: MEDICAL CENTER | Age: 26
End: 2021-09-22
Attending: FAMILY MEDICINE
Payer: COMMERCIAL

## 2021-09-22 ENCOUNTER — OFFICE VISIT (OUTPATIENT)
Dept: URGENT CARE | Facility: CLINIC | Age: 26
End: 2021-09-22
Payer: COMMERCIAL

## 2021-09-22 VITALS
SYSTOLIC BLOOD PRESSURE: 114 MMHG | TEMPERATURE: 98 F | HEIGHT: 69 IN | WEIGHT: 256 LBS | HEART RATE: 90 BPM | RESPIRATION RATE: 12 BRPM | BODY MASS INDEX: 37.92 KG/M2 | DIASTOLIC BLOOD PRESSURE: 70 MMHG | OXYGEN SATURATION: 95 %

## 2021-09-22 DIAGNOSIS — Z11.52 ENCOUNTER FOR SCREENING FOR COVID-19: ICD-10-CM

## 2021-09-22 PROBLEM — Z23 NEED FOR VACCINATION: Status: RESOLVED | Noted: 2017-05-25 | Resolved: 2021-09-22

## 2021-09-22 PROCEDURE — U0005 INFEC AGEN DETEC AMPLI PROBE: HCPCS

## 2021-09-22 PROCEDURE — U0003 INFECTIOUS AGENT DETECTION BY NUCLEIC ACID (DNA OR RNA); SEVERE ACUTE RESPIRATORY SYNDROME CORONAVIRUS 2 (SARS-COV-2) (CORONAVIRUS DISEASE [COVID-19]), AMPLIFIED PROBE TECHNIQUE, MAKING USE OF HIGH THROUGHPUT TECHNOLOGIES AS DESCRIBED BY CMS-2020-01-R: HCPCS

## 2021-09-22 PROCEDURE — 99213 OFFICE O/P EST LOW 20 MIN: CPT | Mod: CS | Performed by: FAMILY MEDICINE

## 2021-09-22 ASSESSMENT — FIBROSIS 4 INDEX: FIB4 SCORE: 0.39

## 2021-09-22 NOTE — PROGRESS NOTES
"  Subjective:      26 y.o. male presents to urgent care for concerns about Covid.  9/18/2020 when he started with runny nose, cough, and headache.  He denies any shortness of breath, fevers, body aches, nausea, vomiting, diarrhea, or loss of sense of smell or taste.  His mom who he lives with was sick with similar symptoms, but was not tested for Covid.  He is fully vaccinated against Covid with the [employer] vaccine.    He does not use tobacco products.  No prior history of asthma or COPD.    He denies any other questions or concerns at this time.    Current problem list, medication, and past medical/surgical history were reviewed in Epic.    ROS  See HPI     Objective:      /70 (BP Location: Right arm, Patient Position: Sitting, BP Cuff Size: Adult)   Pulse 90   Temp 36.7 °C (98 °F) (Temporal)   Resp 12   Ht 1.753 m (5' 9\")   Wt 116 kg (256 lb)   SpO2 95%   BMI 37.80 kg/m²     Physical Exam  Constitutional:       General: He is not in acute distress.     Appearance: He is not diaphoretic.   HENT:      Right Ear: Tympanic membrane, ear canal and external ear normal.      Left Ear: Tympanic membrane, ear canal and external ear normal.      Mouth/Throat:      Pharynx: Oropharynx is clear.      Tonsils: No tonsillar exudate. 2+ on the right. 2+ on the left.   Cardiovascular:      Rate and Rhythm: Normal rate and regular rhythm.      Heart sounds: Normal heart sounds.   Pulmonary:      Effort: Pulmonary effort is normal. No respiratory distress.      Breath sounds: Normal breath sounds.   Neurological:      Mental Status: He is alert.   Psychiatric:         Mood and Affect: Affect normal.         Judgment: Judgment normal.       Assessment/Plan:     1. Encounter for screening for COVID-19  Testing performed for COVID-19. In the meantime patient was advised to isolate until COVID test results returned. I encouraged mask use, frequent handwashing, wiping down hard surfaces, etc. Tylenol and Ibuprofen " were recommended for symptomatic relief. If positive they will be contacted by their local health department regarding return to work/school protocols. Patient is currently without indication of need for higher level of care. Patient/Guardian was given precautionary signs/symptoms that mandate immediate follow up and evaluation in the ED. The patient and/or guardian demonstrated a good understanding and agreed with the treatment plan.  - SARS-CoV-2 PCR (24 hour In-House): Collect NP swab in VTM; Future    Instructed to return to Urgent Care or nearest Emergency Department if symptoms fail to improve, for any change in condition, further concerns, or new concerning symptoms. Patient states understanding of the plan of care and discharge instructions.    Gaby Stock M.D.

## 2021-09-23 LAB — COVID ORDER STATUS COVID19: NORMAL

## 2021-09-24 LAB
SARS-COV-2 RNA RESP QL NAA+PROBE: NOTDETECTED
SPECIMEN SOURCE: NORMAL

## 2021-10-05 NOTE — TELEPHONE ENCOUNTER
DOCUMENTATION OF PAR STATUS:    1. Name of Medication & Dose: Fluticasone-Salmeterol 100-50MCG/DOSE aerosol powder     2. Name of Prescription Coverage Company & phone #: [employer]    3. Date Prior Auth Submitted: 10/05/2021    4. What information was given to obtain insurance decision? icd 10 code    5. Prior Auth Status? Still Pending    6. Patient Notified: yes

## 2021-10-14 NOTE — TELEPHONE ENCOUNTER
FINAL PRIOR AUTHORIZATION STATUS:    1.  Name of Medication & Dose: Fluticasone-Salmeterol 100-50MCG/DOSE aerosol powder     2. Prior Auth Status: Drug is covered by current benefit plan. No further PA activity needed    3. Action Taken: Pharmacy Notified: yes Patient Notified: yes

## 2021-10-25 RX ORDER — ERGOCALCIFEROL 1.25 MG/1
CAPSULE ORAL
Qty: 4 CAPSULE | Refills: 0 | Status: SHIPPED | OUTPATIENT
Start: 2021-10-25 | End: 2021-11-17 | Stop reason: SDUPTHER

## 2021-10-28 NOTE — TELEPHONE ENCOUNTER
----- Message from Chris Wong sent at 10/28/2021 11:23 AM EDT -----  Regarding: Severe Abdominal Issue  Good Morning,    Patient called today about someone calling to give her some advise on her abdominal pain. She has a Colonoscopy coming up with Dr. Willard in the next few weeks. I did advise her to go to the ER if the pain worsens.      Received request via: Pharmacy    Was the patient seen in the last year in this department? Yes    Does the patient have an active prescription (recently filled or refills available) for medication(s) requested? No     Insurance will not cover 60 a month. I changed the sig for what they do cover. Please advise?

## 2021-11-17 ENCOUNTER — OFFICE VISIT (OUTPATIENT)
Dept: NEUROLOGY | Facility: MEDICAL CENTER | Age: 26
End: 2021-11-17
Attending: REGISTERED NURSE
Payer: COMMERCIAL

## 2021-11-17 VITALS
RESPIRATION RATE: 12 BRPM | SYSTOLIC BLOOD PRESSURE: 118 MMHG | DIASTOLIC BLOOD PRESSURE: 72 MMHG | OXYGEN SATURATION: 93 % | HEART RATE: 86 BPM | BODY MASS INDEX: 37.39 KG/M2 | WEIGHT: 252.43 LBS | TEMPERATURE: 97.9 F | HEIGHT: 69 IN

## 2021-11-17 DIAGNOSIS — E55.9 VITAMIN D DEFICIENCY: ICD-10-CM

## 2021-11-17 DIAGNOSIS — F41.9 ANXIETY DISORDER, UNSPECIFIED TYPE: ICD-10-CM

## 2021-11-17 DIAGNOSIS — R56.9 SEIZURE (HCC): ICD-10-CM

## 2021-11-17 DIAGNOSIS — G47.33 OBSTRUCTIVE SLEEP APNEA: ICD-10-CM

## 2021-11-17 DIAGNOSIS — G40.309 GENERALIZED CONVULSIVE EPILEPSY (HCC): ICD-10-CM

## 2021-11-17 PROCEDURE — 99215 OFFICE O/P EST HI 40 MIN: CPT | Performed by: REGISTERED NURSE

## 2021-11-17 PROCEDURE — 99212 OFFICE O/P EST SF 10 MIN: CPT | Performed by: REGISTERED NURSE

## 2021-11-17 RX ORDER — LEVETIRACETAM 750 MG/1
2 TABLET, FILM COATED, EXTENDED RELEASE ORAL EVERY EVENING
Qty: 180 TABLET | Refills: 3 | Status: SHIPPED | OUTPATIENT
Start: 2021-11-17 | End: 2022-05-25

## 2021-11-17 RX ORDER — ERGOCALCIFEROL 1.25 MG/1
50000 CAPSULE ORAL
Qty: 5 CAPSULE | Refills: 3 | Status: SHIPPED | OUTPATIENT
Start: 2021-11-17 | End: 2022-04-19

## 2021-11-17 RX ORDER — LAMOTRIGINE 200 MG/1
TABLET, EXTENDED RELEASE ORAL
Qty: 90 TABLET | Refills: 3 | Status: SHIPPED | OUTPATIENT
Start: 2021-11-17 | End: 2022-07-15 | Stop reason: SDUPTHER

## 2021-11-17 ASSESSMENT — FIBROSIS 4 INDEX: FIB4 SCORE: 0.39

## 2021-11-17 NOTE — PROGRESS NOTES
CC: Seizures        History of present illness:  Braydon Sotelo 26 y.o. male presents today with generalized convulsive epilepsy, autism and sleep apnea.   He was last seen 2021 by Dr. Cooper.  At that time had reported a seizure in 10/2020 for which he admitted that for two days he forgot anti seizure meds.    Today he reports the following:     Currently taking:  Lamicta 200mg (one daily). Klonazepam at bedtime  and  Keppra 750 mg (2 tablets q evening),     Playing flag football, enjoying making [employer] video's.  Plays guitar and loves singing.    No seizures to report since Oct.  2020.      Seizure onset:  18 years of age      Seizure types: Generalized tonic clonic    Last seizure:  10/2020      Prior brain imagin2013    Driving status: No     School/work status:  [employer] for past 5 years.      Past medical history:   Past Medical History:   Diagnosis Date   • Autism    • Autism spectrum disorder    • Seizure (HCC)     since        Past surgical history:   Past Surgical History:   Procedure Laterality Date   • OTHER  1/10/2014    wisdom teeth extraction   • TONSILLECTOMY     • TONSILLECTOMY AND ADENOIDECTOMY         Family history:   Family History   Problem Relation Age of Onset   • No Known Problems Mother    • Sleep Apnea Father    • Hypertension Father    • Obesity Father    • Lung Disease Paternal Aunt    • Lung Disease Paternal Grandmother    • No Known Problems Maternal Grandmother    • Diabetes Maternal Grandfather    • Hyperlipidemia Paternal Grandfather        Social history:   Social History     Socioeconomic History   • Marital status: Single     Spouse name: Not on file   • Number of children: Not on file   • Years of education: Not on file   • Highest education level: Not on file   Occupational History   • Not on file   Tobacco Use   • Smoking status: Never Smoker   • Smokeless tobacco: Never Used   Vaping Use   • Vaping Use: Never used   Substance and Sexual Activity   •  Alcohol use: No     Alcohol/week: 0.0 oz   • Drug use: No   • Sexual activity: Not Currently   Other Topics Concern   • Not on file   Social History Narrative   • Not on file     Social Determinants of Health     Financial Resource Strain:    • Difficulty of Paying Living Expenses: Not on file   Food Insecurity:    • Worried About Running Out of Food in the Last Year: Not on file   • Ran Out of Food in the Last Year: Not on file   Transportation Needs:    • Lack of Transportation (Medical): Not on file   • Lack of Transportation (Non-Medical): Not on file   Physical Activity:    • Days of Exercise per Week: Not on file   • Minutes of Exercise per Session: Not on file   Stress:    • Feeling of Stress : Not on file   Social Connections:    • Frequency of Communication with Friends and Family: Not on file   • Frequency of Social Gatherings with Friends and Family: Not on file   • Attends Baptism Services: Not on file   • Active Member of Clubs or Organizations: Not on file   • Attends Club or Organization Meetings: Not on file   • Marital Status: Not on file   Intimate Partner Violence:    • Fear of Current or Ex-Partner: Not on file   • Emotionally Abused: Not on file   • Physically Abused: Not on file   • Sexually Abused: Not on file   Housing Stability:    • Unable to Pay for Housing in the Last Year: Not on file   • Number of Places Lived in the Last Year: Not on file   • Unstable Housing in the Last Year: Not on file       Current medications:   Current Outpatient Medications   Medication   • vitamin D2, Ergocalciferol, (DRISDOL) 1.25 MG (85216 UT) Cap capsule   • fluticasone-salmeterol (ADVAIR) 100-50 MCG/DOSE AEROSOL POWDER, BREATH ACTIVATED   • loratadine (CLARITIN) 10 MG Tab   • predniSONE (DELTASONE) 20 MG Tab   • LORazepam (ATIVAN) 1 MG Tab   • LamoTRIgine 200 MG TABLET SR 24 HR   • Levetiracetam 750 MG TABLET SR 24 HR   • citalopram (CELEXA) 20 MG Tab     No current facility-administered medications for  "this visit.       Medication Allergy:  No Known Allergies      Review of systems:   Constitutional: denies fever, night sweats, weight loss, or malaise/fatigue.   Eyes: denies acute vision change, eye pain or secretion.   Ears, Nose, Mouth, Throat: denies nasal secretion, nasal bleeding, difficulty swallowing, hearing loss, tinnitus, vertigo, ear pain, oral ulcers or lesions.   Endocrine: denies recent weight changes, heat or cold intolerance, polyuria, polydypsia, polyphagia,abnormal hair growth.  Cardiovascular: denies new onset of chest pain, palpitations, syncope, lower extremity edema, or dyspnea of exertion.  Pulmonary: denies shortness of breath, new onset of cough, hemoptysis, wheezing, chest pain or flu-like symptoms.   GI: denies nausea, vomiting, diarrhea, GI bleeding, change in appetite, abdominal pain, and change in bowel habits.  : denies urinary incontinence, retention or hematuria.  Heme/oncology: denies history of easy bruising or bleeding. No history of cancer. Allergy/immunology: denies hives/urticarial.  Dermatologic: denies new rash.  Musculoskeletal:denies joint swelling or pain, muscle pain, neck and back pain. Neurologic: denies headaches, acute visual changes, facial droopiness, muscle weakness (focal or generalized), paresthesias, anesthesia, ataxia, change in speech or language, memory loss, abnormal movements, seizures, loss of consciousness, or episodes of confusion.   Psychiatric: denies symptoms of depression, anxiety, hallucinations, mood swings or changes, suicidal or homicidal thoughts.     Physical examination:   /72 (BP Location: Right arm, Patient Position: Sitting, BP Cuff Size: Adult)   Pulse 86   Temp 36.6 °C (97.9 °F) (Temporal)   Resp 12   Ht 1.753 m (5' 9\")   Wt 115 kg (252 lb 6.8 oz)   SpO2 93%   BMI 37.28 kg/m²     General: Patient in no acute distress, pleasant and cooperative.  HEENT: Normocephalic, no signs of acute trauma.   Neck: supple, no meningeal " signs or carotid bruits. There is normal range of motion. No tenderness on exam.   Chest: clear to auscultation. No cough.   CV: RRR, no murmurs.   Abdomen: soft, non distended or tender.   Skin: no signs of acute rashes or trauma.   Musculoskeletal: joints exhibit full range of motion, without any pain to palpation. There are no signs of joint or muscle swelling. There is no tenderness to deep palpation of muscles.   Psychiatric: No hallucinatory behavior. Denies symptoms of depression or suicidal ideation. Mood and affect appear normal on exam.     NEUROLOGICAL EXAM:   Mental status, orientation: Awake, alert and fully oriented.   Speech and language: speech is clear and fluent. The patient is able to name, repeat and comprehend.   Memory: There is intact recollection of recent and remote events.   Cranial nerve exam: Pupils are 3-4 mm bilaterally and equally reactive to light and accommodation. Visual fields are intact by confrontation. There is no nystagmus on primary or secondary gaze. Intact full EOM in all directions of gaze. Face appears symmetric. Sensation in the face is intact to light touch. Uvula is midline. Palate elevates symmetrically. Tongue is midline and without any signs of tongue biting or fasciculations.Sternocleidomastoid muscles exhibit is normal strength bilaterally. Shoulder shrug is intact bilaterally.   Motor exam: Strength is 5/5 in all extremities. Tone is normal. No abnormal movements were seen on exam.   Sensory exam reveals normal sense of light touch, proprioception, vibration and pinprick in all extremities.   Deep tendon reflexes:  2+ throughout. Plantar responses are flexor. There is no clonus.   Coordination: shows a normal finger-nose-finger. Normal rapidly alternating movements.   Gait: The patient was able to get up from seated position on first attempt without requiring assistance. Found to be steady when walking. Movements were fluid with normal arm swing. The patient was  able to turn without difficulties or tendency to fall. Romberg exam negative.        ANCILLARY DATA REVIEWED:   Results for OPAL JENNINGS (MRN [id number]) as of 11/17/2021 14:09   Ref. Range 5/14/2021 07:28   WBC Latest Ref Range: 4.8 - 10.8 K/uL 8.5   RBC Latest Ref Range: 4.70 - 6.10 M/uL 5.80   Hemoglobin Latest Ref Range: 14.0 - 18.0 g/dL 16.9   Hematocrit Latest Ref Range: 42.0 - 52.0 % 50.8   MCV Latest Ref Range: 81.4 - 97.8 fL 87.6   MCH Latest Ref Range: 27.0 - 33.0 pg 29.1   MCHC Latest Ref Range: 33.7 - 35.3 g/dL 33.3 (L)   RDW Latest Ref Range: 35.9 - 50.0 fL 38.4   Platelet Count Latest Ref Range: 164 - 446 K/uL 225   MPV Latest Ref Range: 9.0 - 12.9 fL 10.0   Neutrophils-Polys Latest Ref Range: 44.00 - 72.00 % 56.20   Neutrophils (Absolute) Latest Ref Range: 1.82 - 7.42 K/uL 4.80   Lymphocytes Latest Ref Range: 22.00 - 41.00 % 32.50   Lymphs (Absolute) Latest Ref Range: 1.00 - 4.80 K/uL 2.77   Monocytes Latest Ref Range: 0.00 - 13.40 % 7.40   Monos (Absolute) Latest Ref Range: 0.00 - 0.85 K/uL 0.63   Eosinophils Latest Ref Range: 0.00 - 6.90 % 3.00   Eos (Absolute) Latest Ref Range: 0.00 - 0.51 K/uL 0.26   Basophils Latest Ref Range: 0.00 - 1.80 % 0.50   Baso (Absolute) Latest Ref Range: 0.00 - 0.12 K/uL 0.04   Immature Granulocytes Latest Ref Range: 0.00 - 0.90 % 0.40   Immature Granulocytes (abs) Latest Ref Range: 0.00 - 0.11 K/uL 0.03   Nucleated RBC Latest Units: /100 WBC 0.00   NRBC (Absolute) Latest Units: K/uL 0.00   Results for OPAL JENNINGS (MRN [id number]) as of 11/17/2021 14:09   Ref. Range 5/14/2021 07:28   Sodium Latest Ref Range: 135 - 145 mmol/L 141   Potassium Latest Ref Range: 3.6 - 5.5 mmol/L 4.3   Chloride Latest Ref Range: 96 - 112 mmol/L 104   Co2 Latest Ref Range: 20 - 33 mmol/L 24   Anion Gap Latest Ref Range: 7.0 - 16.0  13.0   Glucose Latest Ref Range: 65 - 99 mg/dL 89   Bun Latest Ref Range: 8 - 22 mg/dL 18   Creatinine Latest Ref Range: 0.50 - 1.40 mg/dL 1.03   GFR  If  Latest Ref Range: >60 mL/min/1.73 m 2 >60   GFR If Non  Latest Ref Range: >60 mL/min/1.73 m 2 >60   Calcium Latest Ref Range: 8.4 - 10.2 mg/dL 9.4   AST(SGOT) Latest Ref Range: 12 - 45 U/L 21   ALT(SGPT) Latest Ref Range: 2 - 50 U/L 35   Alkaline Phosphatase Latest Ref Range: 30 - 99 U/L 85   Total Bilirubin Latest Ref Range: 0.1 - 1.5 mg/dL 0.2   Albumin Latest Ref Range: 3.2 - 4.9 g/dL 4.6   Total Protein Latest Ref Range: 6.0 - 8.2 g/dL 7.1   Globulin Latest Ref Range: 1.9 - 3.5 g/dL 2.5   A-G Ratio Latest Units: g/dL 1.8   Glycohemoglobin Latest Ref Range: 4.0 - 5.6 % 5.4   Estim. Avg Glu Latest Units: mg/dL 108   Fasting Status Unknown Fasting               Records reviewed:       EE2013  MRI OF THE BRAIN WITHOUT CONTRAST WITHIN NORMAL LIMITS    ASSESSMENT AND PLAN:    Braydon Sotelo is a 26 year old male with a history of seizures, autism and sleep apnea.  Here today with father.  In good spirits.  Verbalizes sometimes he doesn't wear CPAP and feels tired the next day.  He has had no complaints of seizures.  Will continue meds as previously written and f/u six months. Labs to be drawn before next appointment.    1. Generalized convulsive epilepsy (HCC)    - VITAMIN D,25 HYDROXY; Future  - CBC WITH DIFFERENTIAL; Future  - Comp Metabolic Panel; Future  - LamoTRIgine 200 MG TABLET SR 24 HR; Take 1 tablet by mouth once daily  Dispense: 90 Tablet; Refill: 3  - vitamin D2, Ergocalciferol, (DRISDOL) 1.25 MG (86046 UT) Cap capsule; Take 1 Capsule by mouth every 7 days.  Dispense: 5 Capsule; Refill: 3    2. Obstructive sleep apnea  Instructed to be compliant with wearing CPAP    - VITAMIN D,25 HYDROXY; Future  - CBC WITH DIFFERENTIAL; Future  - Comp Metabolic Panel; Future  - vitamin D2, Ergocalciferol, (DRISDOL) 1.25 MG (06879 UT) Cap capsule; Take 1 Capsule by mouth every 7 days.  Dispense: 5 Capsule; Refill: 3    3. Anxiety disorder, unspecified type    - VITAMIN  D,25 HYDROXY; Future  - CBC WITH DIFFERENTIAL; Future  - Comp Metabolic Panel; Future  - vitamin D2, Ergocalciferol, (DRISDOL) 1.25 MG (61838 UT) Cap capsule; Take 1 Capsule by mouth every 7 days.  Dispense: 5 Capsule; Refill: 3    4. Vitamin D deficiency    - VITAMIN D,25 HYDROXY; Future  - CBC WITH DIFFERENTIAL; Future  - Comp Metabolic Panel; Future  - vitamin D2, Ergocalciferol, (DRISDOL) 1.25 MG (24936 UT) Cap capsule; Take 1 Capsule by mouth every 7 days.  Dispense: 5 Capsule; Refill: 3    5. Seizure (HCC)    - VITAMIN D,25 HYDROXY; Future  - CBC WITH DIFFERENTIAL; Future  - Comp Metabolic Panel; Future  - Levetiracetam 750 MG TABLET SR 24 HR; Take 2 Tablets by mouth every evening.  Dispense: 180 Tablet; Refill: 3  - vitamin D2, Ergocalciferol, (DRISDOL) 1.25 MG (59582 UT) Cap capsule; Take 1 Capsule by mouth every 7 days.  Dispense: 5 Capsule; Refill: 3            FOLLOW-UP:   Six months      EDUCATION AND COUNSELING:  -Education was provided to the patient and/or family regarding diagnosis and prognosis. The chronic and unpredictable nature of the condition were discussed. There is increased risk for additional events, which may carry potential for significant injuries and death. Discussed frequent seizure triggers: sleep deprivation, medication non-compliance, use of illegal drugs/alcohol, stress, and others.   -We reviewed in detail the current antiepileptic regimen. Potential side effects of antiepileptics were discussed at length, including but no limited to: hypersensitivity reactions (rash and others, some of which can be fatal), visual field changes (some of which may be irreversible), glaucoma, diplopia, kidney stones, osteopenia/osteoporosis/bone fractures, hyperthermia/anhydrosis, hyponatremia, tremors/abnormal movements, ataxia, dizziness, fatigue, increased risk for falls, risk for cardiac arrhythmias and syncope, weight changes, hair loss, gastrointestinal side effects(hepatitis, pancreatitis,  gastritis, ulcers, gingival hypertrophy/bleeding, drowsiness, sedation, memory loss, trouble finding words, anxiety/nervousness, increased risk for suicide, increased risk for depression, and psychosis.   -We also reviewed drug-drug interactions and their potential effect on seizure control and medication side effects.    -We also discussed in detail potential effects of seizures, epilepsy, and medications during pregnancy, including but not limited to fetal malformations, child developmental/intellectual disability, fetal/ risk for hemorrhages, stillbirth, maternal death, premature birth, and others. The patient/family aware that pregnancy should be avoided, unless desired, in which case we recommend discussing with us at least a year prior to planned conception. To avoid undesired pregnancy while on antiepileptics, we recommend dual contraception.   -Folic acid 2 mg is recommended for all females in childbearing age (12-44 years of age).   -Recommend chronic vitamin D supplementation and regular exercise (if not contraindicated).   -Patient/family educated on risk for SUDEP (Sudden Death in Epilepsy). Counseling was provided on the importance of strict medication and follow up compliance. The patient/family understand the risks associated with non-adherence with the medical plan as outlined, including but not limited to an increased risk for breakthrough seizures, which may contribute to injuries, disability, status epilepticus, and even death.   -Counseling was also provided on potential effects of alcohol and other drugs, which may lower seizure threshold and/or affect the metabolism of antiepileptic drugs. We recommend avoidance of alcohol and illegal drugs.  -Avoid sleep deprivation.   -We extensively discussed the aspects related to safety in drivers who suffer from epilepsy. The patient is encourage to report to the Division of Motor Vehicles of any condition and/or spells related to confusion,  disorientation, and/or loss of awareness and/or loss of consciousness; as these may pose a safety issue if they occur while operating a motor vehicle. The patient and/or family are ultimately responsible for exercising caution and abiding to regulations in place. The patient understands that only the Department of Motor Vehicles (DMV) is able to authorize an individual to drive, even after medical clearance, DMV clearance must be obtained.   -Other seizure precautions were discussed at length, including no diving, no skydiving, no climbing or exposure to unprotected heights, no unsupervised swimming, no Jacuzzi or bathing in bathtubs or deep bodies of water. The patient/family have been advised about risks for operating any machinery while suffering from seizures / syncope / epilepsy and/or while taking antiepileptic drugs.   -The patient understands and agrees that due to the complexity of his/her diagnosis, results of any testing and further recommendations will typically be discussed/made during a face to face encounter in my office. The patient and/or family further understands it is their responsibility to keep proper follow up.     Patient/family agree with plan, as outlined.         My total time spent caring for the patient on the day of the encounter was 52 minutes.   This does not include time spent on separately billable procedures/tests.      Maria MILLER, KARMENP-C, MSCNP

## 2021-11-19 ENCOUNTER — HOSPITAL ENCOUNTER (OUTPATIENT)
Dept: LAB | Facility: MEDICAL CENTER | Age: 26
End: 2021-11-19
Attending: REGISTERED NURSE
Payer: MEDICARE

## 2021-11-19 DIAGNOSIS — G40.309 GENERALIZED CONVULSIVE EPILEPSY (HCC): ICD-10-CM

## 2021-11-19 DIAGNOSIS — E55.9 VITAMIN D DEFICIENCY: ICD-10-CM

## 2021-11-19 DIAGNOSIS — R56.9 SEIZURE (HCC): ICD-10-CM

## 2021-11-19 DIAGNOSIS — F41.9 ANXIETY DISORDER, UNSPECIFIED TYPE: ICD-10-CM

## 2021-11-19 DIAGNOSIS — G47.33 OBSTRUCTIVE SLEEP APNEA: ICD-10-CM

## 2021-11-19 LAB
ALBUMIN SERPL BCP-MCNC: 4.6 G/DL (ref 3.2–4.9)
ALBUMIN/GLOB SERPL: 1.8 G/DL
ALP SERPL-CCNC: 86 U/L (ref 30–99)
ALT SERPL-CCNC: 39 U/L (ref 2–50)
ANION GAP SERPL CALC-SCNC: 10 MMOL/L (ref 7–16)
AST SERPL-CCNC: 20 U/L (ref 12–45)
BASOPHILS # BLD AUTO: 0.5 % (ref 0–1.8)
BASOPHILS # BLD: 0.04 K/UL (ref 0–0.12)
BILIRUB SERPL-MCNC: 0.4 MG/DL (ref 0.1–1.5)
BUN SERPL-MCNC: 13 MG/DL (ref 8–22)
CALCIUM SERPL-MCNC: 9.2 MG/DL (ref 8.4–10.2)
CHLORIDE SERPL-SCNC: 106 MMOL/L (ref 96–112)
CO2 SERPL-SCNC: 27 MMOL/L (ref 20–33)
CREAT SERPL-MCNC: 1.24 MG/DL (ref 0.5–1.4)
EOSINOPHIL # BLD AUTO: 0.2 K/UL (ref 0–0.51)
EOSINOPHIL NFR BLD: 2.5 % (ref 0–6.9)
ERYTHROCYTE [DISTWIDTH] IN BLOOD BY AUTOMATED COUNT: 38.9 FL (ref 35.9–50)
FASTING STATUS PATIENT QL REPORTED: NORMAL
GLOBULIN SER CALC-MCNC: 2.5 G/DL (ref 1.9–3.5)
GLUCOSE SERPL-MCNC: 84 MG/DL (ref 65–99)
HCT VFR BLD AUTO: 49.6 % (ref 42–52)
HGB BLD-MCNC: 16.8 G/DL (ref 14–18)
IMM GRANULOCYTES # BLD AUTO: 0.03 K/UL (ref 0–0.11)
IMM GRANULOCYTES NFR BLD AUTO: 0.4 % (ref 0–0.9)
LYMPHOCYTES # BLD AUTO: 2.53 K/UL (ref 1–4.8)
LYMPHOCYTES NFR BLD: 32.2 % (ref 22–41)
MCH RBC QN AUTO: 29.9 PG (ref 27–33)
MCHC RBC AUTO-ENTMCNC: 33.9 G/DL (ref 33.7–35.3)
MCV RBC AUTO: 88.3 FL (ref 81.4–97.8)
MONOCYTES # BLD AUTO: 0.56 K/UL (ref 0–0.85)
MONOCYTES NFR BLD AUTO: 7.1 % (ref 0–13.4)
NEUTROPHILS # BLD AUTO: 4.5 K/UL (ref 1.82–7.42)
NEUTROPHILS NFR BLD: 57.3 % (ref 44–72)
NRBC # BLD AUTO: 0 K/UL
NRBC BLD-RTO: 0 /100 WBC
PLATELET # BLD AUTO: 220 K/UL (ref 164–446)
PMV BLD AUTO: 9.5 FL (ref 9–12.9)
POTASSIUM SERPL-SCNC: 4.1 MMOL/L (ref 3.6–5.5)
PROT SERPL-MCNC: 7.1 G/DL (ref 6–8.2)
RBC # BLD AUTO: 5.62 M/UL (ref 4.7–6.1)
SODIUM SERPL-SCNC: 143 MMOL/L (ref 135–145)
WBC # BLD AUTO: 7.9 K/UL (ref 4.8–10.8)

## 2021-11-19 PROCEDURE — 80053 COMPREHEN METABOLIC PANEL: CPT

## 2021-11-19 PROCEDURE — 82306 VITAMIN D 25 HYDROXY: CPT

## 2021-11-19 PROCEDURE — 85025 COMPLETE CBC W/AUTO DIFF WBC: CPT

## 2021-11-19 PROCEDURE — 36415 COLL VENOUS BLD VENIPUNCTURE: CPT

## 2021-11-21 DIAGNOSIS — G40.309 GENERALIZED CONVULSIVE EPILEPSY (HCC): ICD-10-CM

## 2021-11-22 LAB — 25(OH)D3 SERPL-MCNC: 32 NG/ML (ref 30–80)

## 2021-11-22 RX ORDER — CLONAZEPAM 1 MG/1
TABLET ORAL
Qty: 30 TABLET | Refills: 5 | Status: SHIPPED | OUTPATIENT
Start: 2021-11-22 | End: 2022-05-24 | Stop reason: SDUPTHER

## 2022-01-01 NOTE — PROCEDURES
48 HR AMBULATORY ELECTROENCEPHALOGRAM REPORT      DOS:   5/28/2019 (total recording for 21 hours and 37 minutes), and 5/29/2019 (total recording for 24 hours and 44 minutes).     INDICATION:  Braydon Sotelo 23 y.o. male presenting with seizures.     CURRENT ANTIEPILEPTIC REGIMEN: Lamotrigine 200 mg daily.      TECHNIQUE: A 30-channel, 48 hrs ambulatory electroencephalogram (aEEG) was performed in accordance with the international 10-20 system. This digital study was reviewed in bipolar and referential montages. The recording examined the patient during wakeful, drowsy and sleep states.     DESCRIPTION OF THE RECORD:  During the awake state, background shows symmetrical 12 Hz alpha activity posteriorly with amplitude of 70 mV.  There was reactivity with eye opening/closure.  Normal anterior-posterior gradient was noted with faster beta frequencies seen anteriorly.  During drowsiness, high-amplitude delta frequencies were seen.    During the sleep state, background shows diffuse high-amplitude 4-5 Hz delta activity.  Symmetrical high-amplitude sleep spindles and vertex sharp activities were seen in the central leads.    ACTIVATION PROCEDURES:   Not performed.     ICTAL AND/OR INTERICTAL FINDINGS:   No focal or generalized epileptiform activity was noted. No regional slowing was seen during this study.  No seizures were recorded during the study.     EVENT(S):  None.     EKG: sampling review of EKG recording demonstrated sinus rhythm.       INTERPRETATION:   This is a normal 48 hours ambulatory electroencephalogram recording in the awake, drowsy and sleep state.  No events were captured during the study. Clinical correlation is recommended.    Note: A normal electroencephalogram does not rule out epilepsy.         Mando Cooper MD   Epilepsy and Neurodiagnostics.   Clinical  of Neurology [facility] [place] of Medicine.   Diplomate in Neurology, Epilepsy, and  Electrodiagnostic Medicine.   Office: [phone]  Fax: [phone]     6.167

## 2022-04-17 DIAGNOSIS — F41.9 ANXIETY DISORDER, UNSPECIFIED TYPE: ICD-10-CM

## 2022-04-17 DIAGNOSIS — G40.309 GENERALIZED CONVULSIVE EPILEPSY (HCC): ICD-10-CM

## 2022-04-17 DIAGNOSIS — G47.33 OBSTRUCTIVE SLEEP APNEA: ICD-10-CM

## 2022-04-17 DIAGNOSIS — R56.9 SEIZURE (HCC): ICD-10-CM

## 2022-04-17 DIAGNOSIS — E55.9 VITAMIN D DEFICIENCY: ICD-10-CM

## 2022-04-17 DIAGNOSIS — F84.0 AUTISM SPECTRUM DISORDER: ICD-10-CM

## 2022-04-18 RX ORDER — CITALOPRAM 20 MG/1
TABLET ORAL
Qty: 90 TABLET | Refills: 0 | Status: SHIPPED | OUTPATIENT
Start: 2022-04-18 | End: 2022-07-15 | Stop reason: SDUPTHER

## 2022-04-18 NOTE — TELEPHONE ENCOUNTER
Received request via: Pharmacy    Was the patient seen in the last year in this department? Yes    LV   11/17/21  FV    6/2/22    Does the patient have an active prescription (recently filled or refills available) for medication(s) requested? yes

## 2022-04-19 RX ORDER — ERGOCALCIFEROL 1.25 MG/1
CAPSULE ORAL
Qty: 5 CAPSULE | Refills: 0 | Status: SHIPPED | OUTPATIENT
Start: 2022-04-19 | End: 2022-05-24 | Stop reason: SDUPTHER

## 2022-05-22 DIAGNOSIS — E55.9 VITAMIN D DEFICIENCY: ICD-10-CM

## 2022-05-22 DIAGNOSIS — G40.309 GENERALIZED CONVULSIVE EPILEPSY (HCC): ICD-10-CM

## 2022-05-22 DIAGNOSIS — F41.9 ANXIETY DISORDER, UNSPECIFIED TYPE: ICD-10-CM

## 2022-05-22 DIAGNOSIS — G47.33 OBSTRUCTIVE SLEEP APNEA: ICD-10-CM

## 2022-05-22 DIAGNOSIS — R56.9 SEIZURE (HCC): ICD-10-CM

## 2022-05-24 RX ORDER — ERGOCALCIFEROL 1.25 MG/1
CAPSULE ORAL
Qty: 5 CAPSULE | Refills: 0 | Status: SHIPPED | OUTPATIENT
Start: 2022-05-24 | End: 2022-06-22

## 2022-05-24 NOTE — TELEPHONE ENCOUNTER
Received request via: Pharmacy    Was the patient seen in the last year in this department? Yes    LV: 11/17/21  FV: 8/12/22    Does the patient have an active prescription (recently filled or refills available) for medication(s) requested? Yes.

## 2022-05-25 ENCOUNTER — DOCUMENTATION (OUTPATIENT)
Dept: NEUROLOGY | Facility: MEDICAL CENTER | Age: 27
End: 2022-05-25
Payer: COMMERCIAL

## 2022-05-29 DIAGNOSIS — G40.309 GENERALIZED CONVULSIVE EPILEPSY (HCC): ICD-10-CM

## 2022-05-31 RX ORDER — CLONAZEPAM 1 MG/1
TABLET ORAL
Qty: 30 TABLET | Refills: 2 | Status: SHIPPED | OUTPATIENT
Start: 2022-05-31 | End: 2022-07-15 | Stop reason: SDUPTHER

## 2022-06-19 DIAGNOSIS — G47.33 OBSTRUCTIVE SLEEP APNEA: ICD-10-CM

## 2022-06-19 DIAGNOSIS — F41.9 ANXIETY DISORDER, UNSPECIFIED TYPE: ICD-10-CM

## 2022-06-19 DIAGNOSIS — R56.9 SEIZURE (HCC): ICD-10-CM

## 2022-06-19 DIAGNOSIS — G40.309 GENERALIZED CONVULSIVE EPILEPSY (HCC): ICD-10-CM

## 2022-06-19 DIAGNOSIS — E55.9 VITAMIN D DEFICIENCY: ICD-10-CM

## 2022-06-22 RX ORDER — ERGOCALCIFEROL 1.25 MG/1
CAPSULE ORAL
Qty: 5 CAPSULE | Refills: 0 | Status: SHIPPED | OUTPATIENT
Start: 2022-06-22 | End: 2022-08-03

## 2022-07-14 NOTE — PROGRESS NOTES
Chief Complaint   Patient presents with   • Follow-Up     epilepsy       Problem List Items Addressed This Visit    None     Visit Diagnoses     Generalized convulsive epilepsy (HCC)        Screening for depression        Mood disorder (HCC)              History of present illness:  Braydon Sotelo 27 y.o. male presents today with mom and dad to establish care with me. Last seen by Maria Ordoñez in 2021. Has KACY on CPAP but non compliant, autism spectrum- highly functioning, possible asperger's.     Pt has hx of uncontrolled epilepsy that started in his teenage years. All GTC's that they know of. No other seizure types.There was post-ictal confusion and fatigue, has tongue biting, bladder incontinence and vomiting. Duration:2-3min. Frequency: 1 every 4-6 months. Triggers: anxiety, sleep deprivation, stress, blinking lights. No auras. Possibly 1 day with cluster seizures. Last seizure was in Oct 2020 d/t missing meds. Currently on lamictal ER 200mg in am, Keppra ER 1500mg QHS, clonazepam 1mg QHS. Compliant now. No side effects. No rash. But mom states that he is sleepy all the time but he also has poor sleeping habits. He also has an old bottle of ativan PRN for breakthrough seizure that mom wants a new refill.    No family hx of epilepsy. No TBI hx. Normal prenatal and  course. Normal childhood.      Mood fluctuates. Has anxiety, depression and anger issues. He sees a therapist. No SI or HI.     He is taking vit D.     He drinks alcohol (1 beer) every 2-3 months. No cigarettes or recreational drug use.     He doesn't drive.     No other concerns.     Past medical history:   Past Medical History:   Diagnosis Date   • Autism    • Autism spectrum disorder    • Seizure (HCC)     since        Past surgical history:   Past Surgical History:   Procedure Laterality Date   • OTHER  1/10/2014    wisdom teeth extraction   • TONSILLECTOMY     • TONSILLECTOMY AND ADENOIDECTOMY         Family history:   Family  History   Problem Relation Age of Onset   • No Known Problems Mother    • Sleep Apnea Father    • Hypertension Father    • Obesity Father    • Lung Disease Paternal Aunt    • Lung Disease Paternal Grandmother    • No Known Problems Maternal Grandmother    • Diabetes Maternal Grandfather    • Hyperlipidemia Paternal Grandfather        Social history:   Social History     Socioeconomic History   • Marital status: Single     Spouse name: Not on file   • Number of children: Not on file   • Years of education: Not on file   • Highest education level: Not on file   Occupational History   • Not on file   Tobacco Use   • Smoking status: Never Smoker   • Smokeless tobacco: Never Used   Vaping Use   • Vaping Use: Never used   Substance and Sexual Activity   • Alcohol use: No     Alcohol/week: 0.0 oz   • Drug use: No   • Sexual activity: Not Currently   Other Topics Concern   • Not on file   Social History Narrative   • Not on file     Social Determinants of Health     Financial Resource Strain: Not on file   Food Insecurity: Not on file   Transportation Needs: Not on file   Physical Activity: Not on file   Stress: Not on file   Social Connections: Not on file   Intimate Partner Violence: Not on file   Housing Stability: Not on file       Current medications:   Current Outpatient Medications   Medication   • vitamin D2, Ergocalciferol, (DRISDOL) 1.25 MG (54863 UT) Cap capsule   • clonazePAM (KLONOPIN) 1 MG Tab   • Levetiracetam 750 MG TABLET SR 24 HR   • citalopram (CELEXA) 20 MG Tab   • LamoTRIgine 200 MG TABLET SR 24 HR   • fluticasone-salmeterol (ADVAIR) 100-50 MCG/DOSE AEROSOL POWDER, BREATH ACTIVATED   • loratadine (CLARITIN) 10 MG Tab   • LORazepam (ATIVAN) 1 MG Tab     No current facility-administered medications for this visit.       Medication Allergy:  No Known Allergies      Review of systems:     General: Denies fevers or chills, or nightsweats, or generalized fatigue.    Head: Denies headaches or dizziness or  "lightheadedness  EENT: Denies vision changes, vision loss or pain, nasal secretion, nasal bleeding, difficulty swallowing, hearing loss, tinnitus, vertigo, ear pain  Respiratory: Denies shortness of breath, cough, sputum, or wheezing  Cardiac: Denies chest pain, palpitations, edema or syncope  Gastrointestinal: Denies nausea, vomiting, no abdominal pain or change in bowel habits, no melena or hematochezia  Urinary: Denies dysuria, frequency, hesitancy, or incontinence.  Dermatologic:  Denies new rash  Musculoskeletal: Denies muscle pain or swelling, no atrophy, no neck and back pain or stiffness.   Neurologic: Denies facial droopiness, muscle weakness (focal or generalized), paresthesias, ataxia, change in speech or language, memory loss, abnormal movements. +hx of seizures  Psychiatric: Denies  suicidal or homicidal thoughts. +anxiety, depression, mood swings       Physical examination:   Vitals:    07/15/22 1039   BP: 124/76   BP Location: Right arm   Patient Position: Sitting   BP Cuff Size: Adult   Pulse: 74   SpO2: 97%   Weight: 118 kg (261 lb)   Height: 1.778 m (5' 10\")     General: Patient in no acute distress, pleasant and cooperative.  HEENT: Normocephalic, no signs of acute trauma.   moist conjunctivae. Nares are patent. Oropharynx clear without lesions and normal  hard and soft palates.   Neck: Supple. There is normal range of motion.   Resp: clear to auscultation bilaterally. No wheezes or crackles.   CV: RRR, no murmurs.   Skin: no signs of acute rashes or trauma.   Musculoskeletal: joints exhibit full range of motion  Psychiatric: No hallucinatory behavior. No symptoms of depression or suicidal ideation. Mood and affect appear normal on exam.     NEUROLOGICAL EXAM:   Mental status, orientation: Awake, alert and fully oriented.   Speech and language: speech is clear and fluent. The patient is able to name, repeat and comprehend.   Memory: There is intact recollection of recent and remote events. "   Cranial nerve exam:   CN I: Not examined   CN II: PERRL.   CN III, IV, VI: EOMI; no nystagmus   CN V: Facial sensation intact bilaterally   CN VII: face symmetric   CN VIII: hearing intact to finger rub bilaterally   CN IX, X: palate elevates symmetrically   CN XI: Symmetric shoulder shrug  CN XII: tongue midline.   Motor exam: Strength is 5/5 in all extremities. Tone is normal. No abnormal movements were seen on exam.   Sensory exam reveals normal sense of light touch in all extremities.   Deep tendon reflexes:  2+ throughout.   Coordination: shows a normal finger-nose-finger. Normal rapidly alternating movements.   Gait: The patient was able to get up from seated position on first attempt without requiring assistance. Found to be steady when walking. Movements were fluid with normal arm swing. The patient was able to turn without difficulties or tendency to fall. Romberg exam unremarkable.       ANCILLARY DATA REVIEWED:       Lab Data Review:  Reviewed in chart. CBC ,CMP, Vit D    Records reviewed:   Reviewed in chart.    Imaging:   MRI brain 2013  MRI OF THE BRAIN WITHOUT CONTRAST WITHIN NORMAL LIMITS.    EEG:  EEG 2019  This is a normal 48 hours ambulatory electroencephalogram recording in the awake, drowsy and sleep state.  No events were captured during the study. Clinical correlation is recommended.    48hr EEG 5/29/2019  This is a normal 48 hours ambulatory electroencephalogram recording in the awake, drowsy and sleep state.  No events were captured during the study. Clinical correlation is recommended.        ASSESSMENT AND PLAN:    1. Generalized convulsive epilepsy (HCC)  CBC WITH DIFFERENTIAL    Comp Metabolic Panel    LEVETIRACETAM (KEPPRA), S    LAMOTRIGINE    clonazePAM (KLONOPIN) 1 MG Tab    LamoTRIgine 200 MG TABLET SR 24 HR    Levetiracetam 750 MG TABLET SR 24 HR    LORazepam (ATIVAN) 1 MG Tab   2. Screening for depression     3. Mood disorder (HCC)  citalopram (CELEXA) 20 MG Tab   4. Autism  spectrum disorder     5. Vitamin D deficiency  VITAMIN D,25 HYDROXY             CLINICAL DISCUSSION:  Hx of uncontrolled seizures that started in his teenage years. Pt is in the autism spectrum but high functional. Only has one seizure type: GTC. No auras. Triggers: anxiety, sleep deprivation, stress, blinking lights, missing dose. Seizures are mostly controlled now on current regimen. Last one was in Oct 2020 d/t missing meds. MRI brain unremarkable. Most recent EEGs normal. They don't want to update work-up unless he has another seizure.      No family hx of epilepsy. No TBI hx. Normal prenatal and  course. Normal childhood.      He drinks alcohol (1 beer) every 2-3 months. No cigarettes or recreational drug use.     Mood fluctuates. Has anxiety, depression and anger issues. He sees a therapist. No SI or HI. Given bridge of celexa refills until he is seen by his PCP      Past ASM's: none    Current ASM's: lamictal ER 200mg in am, Keppra ER 1500mg QHS, clonazepam 1mg QHS. Ativan 1mg PRN for breakthrough seizure      Plan:  - Continue ASMs    - Discussed avoidance of spell/sz triggers: alcohol, sleep deprivation, and stress.    - Discussed Vit D supplementation. Recommended taking 2000-5000u daily.    - Discussed driving restrictions. Pt is not driving.     -Labs to be checked for next appointment: CBC, CMP, Vit D, Keppra and lamotrigine    -discussed compliance with CPAP    -Aware that I will no longer be with [facility] after 2022.               FOLLOW-UP:   Return in about 6 months (around 1/15/2023).      EDUCATION AND COUNSELING:  -Education was provided to the patient and/or family regarding diagnosis and prognosis. The chronic and unpredictable nature of the condition were discussed. There is increased risk for additional events, which may carry potential for significant injuries and death. Discussed frequent seizure triggers: sleep deprivation, medication non-compliance, use of illegal  drugs/alcohol, stress, and others.   - There are potential side effects of antiepileptics including but no limited to: hypersensitivity reactions (rash and others, some of which can be fatal), visual field changes (some of which may be irreversible), glaucoma, diplopia, kidney stones, osteopenia/osteoporosis/bone fractures, hyperthermia/anhydrosis, hyponatremia, tremors/abnormal movements, ataxia, dizziness, fatigue, increased risk for falls, risk for cardiac arrhythmias/syncope, gastrointestinal side effects(hepatitis, pancreatitis, gastritis, ulcers), gingival hypertrophy/bleeding, drowsiness, sedation, anxiety/nervousness, increased risk for suicide, increased risk for depression, and psychosis.   -Recommend chronic vitamin D supplementation and regular exercise (if not contraindicated).   -Patient/family educated on risk for SUDEP (Sudden Death in Epilepsy). Counseling was provided on the importance of strict medication and follow up compliance. The patient/family understand the risks associated with non-adherence with the medical plan as outlined, including but not limited to an increased risk for breakthrough seizures, which may contribute to injuries, disability, status epilepticus, and even death.   -There are potential effects of alcohol and other drugs, which may lower seizure threshold and/or affect the metabolism of antiepileptic drugs. We recommend avoidance of alcohol and illegal drugs.  -Avoid sleep deprivation.   -The patient is encourage to report to the Division of Motor Vehicles of any condition and/or spells related to confusion, disorientation, and/or loss of awareness and/or loss of consciousness; as these may pose a safety issue if they occur while operating a motor vehicle. The patient and/or family are ultimately responsible for exercising caution and abiding to regulations in place.   -Other seizure precautions were discussed at length, including no diving, no skydiving, no climbing or  exposure to unprotected heights, no unsupervised swimming, no Jacuzzi or bathing in bathtubs or deep bodies of water. There are risks for operating any machinery while suffering from seizures / syncope / epilepsy and/or while taking antiepileptic drugs.   -The patient understands and agrees that due to the complexity of his/her diagnosis, results of any testing and further recommendations will typically be discussed/made during a face to face encounter in my office. The patient and/or family further understands it is their responsibility to keep proper follow up.     Patient/family agree with plan, as outlined.         Hailey Peguero, MSN, APRN, FNP-C  [facility]  Office: [phone]  Fax: [phone]    BILLING DOCUMENTATION:   Total time spent including chart review before and after visit was 60min. Over 50% of the time of the visit today was spent on counseling and or coordination of care wtih the patient and/or family, with greater than 50% of the total discussing my assessment and plan as stated above.

## 2022-07-15 ENCOUNTER — OFFICE VISIT (OUTPATIENT)
Dept: NEUROLOGY | Facility: MEDICAL CENTER | Age: 27
End: 2022-07-15
Attending: NURSE PRACTITIONER
Payer: MEDICARE

## 2022-07-15 VITALS
SYSTOLIC BLOOD PRESSURE: 124 MMHG | HEART RATE: 74 BPM | HEIGHT: 70 IN | WEIGHT: 261 LBS | BODY MASS INDEX: 37.37 KG/M2 | OXYGEN SATURATION: 97 % | DIASTOLIC BLOOD PRESSURE: 76 MMHG

## 2022-07-15 DIAGNOSIS — F39 MOOD DISORDER (HCC): ICD-10-CM

## 2022-07-15 DIAGNOSIS — E55.9 VITAMIN D DEFICIENCY: ICD-10-CM

## 2022-07-15 DIAGNOSIS — F84.0 AUTISM SPECTRUM DISORDER: ICD-10-CM

## 2022-07-15 DIAGNOSIS — G40.309 GENERALIZED CONVULSIVE EPILEPSY (HCC): ICD-10-CM

## 2022-07-15 DIAGNOSIS — Z13.31 SCREENING FOR DEPRESSION: ICD-10-CM

## 2022-07-15 PROCEDURE — 99417 PROLNG OP E/M EACH 15 MIN: CPT | Performed by: NURSE PRACTITIONER

## 2022-07-15 PROCEDURE — 99211 OFF/OP EST MAY X REQ PHY/QHP: CPT | Performed by: NURSE PRACTITIONER

## 2022-07-15 PROCEDURE — 99215 OFFICE O/P EST HI 40 MIN: CPT | Performed by: NURSE PRACTITIONER

## 2022-07-15 RX ORDER — LAMOTRIGINE 200 MG/1
200 TABLET, EXTENDED RELEASE ORAL EVERY MORNING
Qty: 90 TABLET | Refills: 1 | Status: SHIPPED | OUTPATIENT
Start: 2022-07-15 | End: 2023-01-11

## 2022-07-15 RX ORDER — LEVETIRACETAM 750 MG/1
2 TABLET, FILM COATED, EXTENDED RELEASE ORAL EVERY EVENING
Qty: 180 TABLET | Refills: 5 | Status: SHIPPED | OUTPATIENT
Start: 2022-07-15 | End: 2023-01-11

## 2022-07-15 RX ORDER — LORAZEPAM 1 MG/1
1 TABLET ORAL
Qty: 10 TABLET | Refills: 2 | Status: SHIPPED | OUTPATIENT
Start: 2022-07-15 | End: 2022-10-13

## 2022-07-15 RX ORDER — CLONAZEPAM 1 MG/1
1 TABLET ORAL
Qty: 30 TABLET | Refills: 5 | Status: SHIPPED | OUTPATIENT
Start: 2022-07-15 | End: 2023-01-11

## 2022-07-15 RX ORDER — CITALOPRAM 20 MG/1
20 TABLET ORAL DAILY
Qty: 30 TABLET | Refills: 2 | Status: SHIPPED | OUTPATIENT
Start: 2022-07-15 | End: 2022-10-13

## 2022-07-15 ASSESSMENT — FIBROSIS 4 INDEX: FIB4 SCORE: 0.38

## 2022-07-29 ENCOUNTER — HOSPITAL ENCOUNTER (OUTPATIENT)
Dept: LAB | Facility: MEDICAL CENTER | Age: 27
End: 2022-07-29
Attending: NURSE PRACTITIONER
Payer: MEDICARE

## 2022-07-29 DIAGNOSIS — G40.309 GENERALIZED CONVULSIVE EPILEPSY (HCC): ICD-10-CM

## 2022-07-29 DIAGNOSIS — E55.9 VITAMIN D DEFICIENCY: ICD-10-CM

## 2022-07-29 LAB
25(OH)D3 SERPL-MCNC: 36 NG/ML (ref 30–100)
ALBUMIN SERPL BCP-MCNC: 4.6 G/DL (ref 3.2–4.9)
ALBUMIN/GLOB SERPL: 2.1 G/DL
ALP SERPL-CCNC: 85 U/L (ref 30–99)
ALT SERPL-CCNC: 41 U/L (ref 2–50)
ANION GAP SERPL CALC-SCNC: 11 MMOL/L (ref 7–16)
AST SERPL-CCNC: 17 U/L (ref 12–45)
BASOPHILS # BLD AUTO: 0.8 % (ref 0–1.8)
BASOPHILS # BLD: 0.05 K/UL (ref 0–0.12)
BILIRUB SERPL-MCNC: 0.3 MG/DL (ref 0.1–1.5)
BUN SERPL-MCNC: 16 MG/DL (ref 8–22)
CALCIUM SERPL-MCNC: 8.9 MG/DL (ref 8.4–10.2)
CHLORIDE SERPL-SCNC: 105 MMOL/L (ref 96–112)
CO2 SERPL-SCNC: 23 MMOL/L (ref 20–33)
CREAT SERPL-MCNC: 1.1 MG/DL (ref 0.5–1.4)
EOSINOPHIL # BLD AUTO: 0.15 K/UL (ref 0–0.51)
EOSINOPHIL NFR BLD: 2.4 % (ref 0–6.9)
ERYTHROCYTE [DISTWIDTH] IN BLOOD BY AUTOMATED COUNT: 37.7 FL (ref 35.9–50)
FASTING STATUS PATIENT QL REPORTED: NORMAL
GFR SERPLBLD CREATININE-BSD FMLA CKD-EPI: 95 ML/MIN/1.73 M 2
GLOBULIN SER CALC-MCNC: 2.2 G/DL (ref 1.9–3.5)
GLUCOSE SERPL-MCNC: 93 MG/DL (ref 65–99)
HCT VFR BLD AUTO: 48.3 % (ref 42–52)
HGB BLD-MCNC: 16.5 G/DL (ref 14–18)
IMM GRANULOCYTES # BLD AUTO: 0.02 K/UL (ref 0–0.11)
IMM GRANULOCYTES NFR BLD AUTO: 0.3 % (ref 0–0.9)
LYMPHOCYTES # BLD AUTO: 2.09 K/UL (ref 1–4.8)
LYMPHOCYTES NFR BLD: 33.4 % (ref 22–41)
MCH RBC QN AUTO: 29.5 PG (ref 27–33)
MCHC RBC AUTO-ENTMCNC: 34.2 G/DL (ref 33.7–35.3)
MCV RBC AUTO: 86.4 FL (ref 81.4–97.8)
MONOCYTES # BLD AUTO: 0.5 K/UL (ref 0–0.85)
MONOCYTES NFR BLD AUTO: 8 % (ref 0–13.4)
NEUTROPHILS # BLD AUTO: 3.45 K/UL (ref 1.82–7.42)
NEUTROPHILS NFR BLD: 55.1 % (ref 44–72)
NRBC # BLD AUTO: 0 K/UL
NRBC BLD-RTO: 0 /100 WBC
PLATELET # BLD AUTO: 236 K/UL (ref 164–446)
PMV BLD AUTO: 10.1 FL (ref 9–12.9)
POTASSIUM SERPL-SCNC: 4.3 MMOL/L (ref 3.6–5.5)
PROT SERPL-MCNC: 6.8 G/DL (ref 6–8.2)
RBC # BLD AUTO: 5.59 M/UL (ref 4.7–6.1)
SODIUM SERPL-SCNC: 139 MMOL/L (ref 135–145)
WBC # BLD AUTO: 6.3 K/UL (ref 4.8–10.8)

## 2022-07-29 PROCEDURE — 80053 COMPREHEN METABOLIC PANEL: CPT

## 2022-07-29 PROCEDURE — 82306 VITAMIN D 25 HYDROXY: CPT

## 2022-07-29 PROCEDURE — 36415 COLL VENOUS BLD VENIPUNCTURE: CPT

## 2022-07-29 PROCEDURE — 80175 DRUG SCREEN QUAN LAMOTRIGINE: CPT

## 2022-07-29 PROCEDURE — 80177 DRUG SCRN QUAN LEVETIRACETAM: CPT

## 2022-07-29 PROCEDURE — 85025 COMPLETE CBC W/AUTO DIFF WBC: CPT

## 2022-07-30 LAB
LAMOTRIGINE SERPL-MCNC: 2.7 UG/ML (ref 3–15)
LEVETIRACETAM SERPL-MCNC: 3 UG/ML (ref 10–40)

## 2022-07-31 DIAGNOSIS — F41.9 ANXIETY DISORDER, UNSPECIFIED TYPE: ICD-10-CM

## 2022-07-31 DIAGNOSIS — R56.9 SEIZURE (HCC): ICD-10-CM

## 2022-07-31 DIAGNOSIS — G47.33 OBSTRUCTIVE SLEEP APNEA: ICD-10-CM

## 2022-07-31 DIAGNOSIS — G40.309 GENERALIZED CONVULSIVE EPILEPSY (HCC): ICD-10-CM

## 2022-07-31 DIAGNOSIS — E55.9 VITAMIN D DEFICIENCY: ICD-10-CM

## 2022-08-03 RX ORDER — ERGOCALCIFEROL 1.25 MG/1
CAPSULE ORAL
Qty: 5 CAPSULE | Refills: 0 | Status: SHIPPED | OUTPATIENT
Start: 2022-08-03 | End: 2022-09-07

## 2022-08-10 ENCOUNTER — OFFICE VISIT (OUTPATIENT)
Dept: MEDICAL GROUP | Age: 27
End: 2022-08-10
Payer: MEDICARE

## 2022-08-10 VITALS
BODY MASS INDEX: 38.36 KG/M2 | SYSTOLIC BLOOD PRESSURE: 120 MMHG | OXYGEN SATURATION: 91 % | HEIGHT: 69 IN | RESPIRATION RATE: 16 BRPM | HEART RATE: 105 BPM | TEMPERATURE: 97.4 F | WEIGHT: 259 LBS | DIASTOLIC BLOOD PRESSURE: 78 MMHG

## 2022-08-10 DIAGNOSIS — R56.9 SEIZURE (HCC): ICD-10-CM

## 2022-08-10 DIAGNOSIS — G47.33 OSA (OBSTRUCTIVE SLEEP APNEA): ICD-10-CM

## 2022-08-10 DIAGNOSIS — F84.0 AUTISM SPECTRUM DISORDER: ICD-10-CM

## 2022-08-10 PROCEDURE — 99214 OFFICE O/P EST MOD 30 MIN: CPT | Performed by: FAMILY MEDICINE

## 2022-08-10 ASSESSMENT — FIBROSIS 4 INDEX: FIB4 SCORE: 0.29

## 2022-08-10 NOTE — LETTER
August 10, 2022       Patient: Braydon Sotelo   YOB: 1995   Date of Visit: 8/10/2022         To Whom It May Concern:    In my medical opinion, I recommend that Braydon Sotelo be excused form work today.    If you have any questions or concerns, please don't hesitate to call [phone]          Sincerely,          Rajat Burciaga M.D.  Electronically Signed

## 2022-08-10 NOTE — PROGRESS NOTES
This medical record contains text that has been entered with the assistance of computer voice recognition and dictation software.  Therefore, it may contain unintended errors in text, spelling, punctuation, or grammar      Chief Complaint   Patient presents with    Follow-Up     Sign paperwork          Braydon Sotelo is a 27 y.o. male here evaluation and management of: Autism, disability, insurance      HPI:     1. Autism spectrum disorder  2. Seizure (HCC)  3. KACY (obstructive sleep apnea)    Braydon is a very pleasant 27-year-old male with a diagnosis of autism spectrum as well as seizures and obstructive sleep apnea.  He is completely dependent on his parents to help with communication such as ordering medication from the pharmacy, organizing his financial issues, emotional support and driving.  He has been on his parents insurance however he turned 27 for now this will change they require a note for me discussing his diagnoses.    Current medicines (including changes today)  Current Outpatient Medications   Medication Sig Dispense Refill    vitamin D2, Ergocalciferol, (DRISDOL) 1.25 MG (73818 UT) Cap capsule Take 1 capsule by mouth once a week 5 Capsule 0    citalopram (CELEXA) 20 MG Tab Take 1 Tablet by mouth every day for 90 days. Take 1 tablet by mouth once daily 30 Tablet 2    clonazePAM (KLONOPIN) 1 MG Tab Take 1 Tablet by mouth at bedtime for 180 days. 30 Tablet 5    LamoTRIgine 200 MG TABLET SR 24 HR Take 1 Tablet by mouth every morning for 180 days. Take 1 tablet by mouth once daily 90 Tablet 1    Levetiracetam 750 MG TABLET SR 24 HR Take 2 Tablets by mouth every evening for 180 days. 180 Tablet 5    LORazepam (ATIVAN) 1 MG Tab Take 1 Tablet by mouth 1 time a day as needed (breakthrough seizure) for up to 90 days. 10 Tablet 2    fluticasone-salmeterol (ADVAIR) 100-50 MCG/DOSE AEROSOL POWDER, BREATH ACTIVATED Inhale 1 Puff every 12 hours. 1 Each 5    loratadine (CLARITIN) 10 MG Tab Take 1 Tablet by  "mouth every day. 30 Tablet 2     No current facility-administered medications for this visit.     He  has a past medical history of Autism, Autism spectrum disorder, and Seizure (HCC).  He  has a past surgical history that includes tonsillectomy and adenoidectomy; other (1/10/2014); and tonsillectomy.  Social History     Tobacco Use    Smoking status: Never    Smokeless tobacco: Never   Vaping Use    Vaping Use: Never used   Substance Use Topics    Alcohol use: No     Alcohol/week: 0.0 oz    Drug use: No     Social History     Social History Narrative    Not on file     Family History   Problem Relation Age of Onset    No Known Problems Mother     Sleep Apnea Father     Hypertension Father     Obesity Father     Lung Disease Paternal Aunt     Lung Disease Paternal Grandmother     No Known Problems Maternal Grandmother     Diabetes Maternal Grandfather     Hyperlipidemia Paternal Grandfather      Family Status   Relation Name Status    Mo  Alive    Fa  Alive    PAunt      PGMo      MGMo      MGFa      PGFa           ROS    The pertinent  ROS findings can be seen in the HPI above.     All other systems reviewed and are negative     Objective:     /78 (BP Location: Right arm, Patient Position: Sitting, BP Cuff Size: Adult long)   Pulse (!) 105   Temp 36.3 °C (97.4 °F) (Temporal)   Resp 16   Ht 1.753 m (5' 9\")   Wt 117 kg (259 lb)   SpO2 91%  Body mass index is 38.25 kg/m².      Physical Exam:    Constitutional: Alert, no distress.  Skin: No suspicious lesions  Eye: Equal, round and reactive, conjunctiva clear, lids normal.  ENMT: Lips without lesions, good dentition, oropharynx clear.  Neck: Trachea midline, no masses, no thyromegaly. No cervical or supraclavicular lymphadenopathy.  Respiratory: Unlabored respiratory effort, lungs clear to auscultation, no wheezes, no ronchi.  Cardiovascular: Normal S1, S2, no murmur, no edema  Abdomen: Soft, non-tender, no masses, no " hepatosplenomegaly.        Assessment and Plan:   The following treatment plan was discussed      1. Autism spectrum disorder  2. Seizure (HCC)  3. KACY (obstructive sleep apnea)    The patient does have the above diagnoses.  This was documented and signed on the paperwork that they brought in to me.  Paperwork is scanned.        Instructed to Follow up in clinic or ER for worsening symptoms, difficulty breathing, lack of expected recovery, or should new symptoms or problems arise.    Followup: Return in about 6 months (around 2/10/2023) for Reevaluation, labs.

## 2022-09-04 DIAGNOSIS — E55.9 VITAMIN D DEFICIENCY: ICD-10-CM

## 2022-09-04 DIAGNOSIS — G40.309 GENERALIZED CONVULSIVE EPILEPSY (HCC): ICD-10-CM

## 2022-09-04 DIAGNOSIS — R56.9 SEIZURE (HCC): ICD-10-CM

## 2022-09-04 DIAGNOSIS — G47.33 OBSTRUCTIVE SLEEP APNEA: ICD-10-CM

## 2022-09-04 DIAGNOSIS — F41.9 ANXIETY DISORDER, UNSPECIFIED TYPE: ICD-10-CM

## 2022-09-07 RX ORDER — ERGOCALCIFEROL 1.25 MG/1
CAPSULE ORAL
Qty: 5 CAPSULE | Refills: 0 | Status: SHIPPED | OUTPATIENT
Start: 2022-09-07 | End: 2022-09-26

## 2022-09-25 DIAGNOSIS — F41.9 ANXIETY DISORDER, UNSPECIFIED TYPE: ICD-10-CM

## 2022-09-25 DIAGNOSIS — E55.9 VITAMIN D DEFICIENCY: ICD-10-CM

## 2022-09-25 DIAGNOSIS — G47.33 OBSTRUCTIVE SLEEP APNEA: ICD-10-CM

## 2022-09-25 DIAGNOSIS — G40.309 GENERALIZED CONVULSIVE EPILEPSY (HCC): ICD-10-CM

## 2022-09-25 DIAGNOSIS — R56.9 SEIZURE (HCC): ICD-10-CM

## 2022-09-26 RX ORDER — ERGOCALCIFEROL 1.25 MG/1
CAPSULE ORAL
Qty: 5 CAPSULE | Refills: 0 | Status: SHIPPED | OUTPATIENT
Start: 2022-09-26 | End: 2022-11-01

## 2022-10-26 ENCOUNTER — TELEPHONE (OUTPATIENT)
Dept: NEUROLOGY | Facility: MEDICAL CENTER | Age: 27
End: 2022-10-26
Payer: MEDICARE

## 2022-10-26 NOTE — TELEPHONE ENCOUNTER
Received request via: Pharmacy    Was the patient seen in the last year in this department? Yes    Does the patient have an active prescription (recently filled or refills available) for medication(s) requested? No     Refill requested via pharmacy via fax for Citalopram 20mg tab take 1 tablet by month once daily.

## 2022-10-31 DIAGNOSIS — G47.33 OBSTRUCTIVE SLEEP APNEA: ICD-10-CM

## 2022-10-31 DIAGNOSIS — R56.9 SEIZURE (HCC): ICD-10-CM

## 2022-10-31 DIAGNOSIS — E55.9 VITAMIN D DEFICIENCY: ICD-10-CM

## 2022-10-31 DIAGNOSIS — G40.309 GENERALIZED CONVULSIVE EPILEPSY (HCC): ICD-10-CM

## 2022-10-31 DIAGNOSIS — F41.9 ANXIETY DISORDER, UNSPECIFIED TYPE: ICD-10-CM

## 2022-11-01 RX ORDER — ERGOCALCIFEROL 1.25 MG/1
CAPSULE ORAL
Qty: 5 CAPSULE | Refills: 0 | Status: SHIPPED | OUTPATIENT
Start: 2022-11-01

## 2022-11-04 ENCOUNTER — PATIENT MESSAGE (OUTPATIENT)
Dept: HEALTH INFORMATION MANAGEMENT | Facility: OTHER | Age: 27
End: 2022-11-04

## 2023-01-16 ENCOUNTER — OFFICE VISIT (OUTPATIENT)
Dept: MEDICAL GROUP | Age: 28
End: 2023-01-16
Payer: MEDICARE

## 2023-01-16 VITALS
DIASTOLIC BLOOD PRESSURE: 70 MMHG | HEART RATE: 91 BPM | TEMPERATURE: 98.4 F | OXYGEN SATURATION: 91 % | HEIGHT: 70 IN | WEIGHT: 261 LBS | BODY MASS INDEX: 37.37 KG/M2 | RESPIRATION RATE: 16 BRPM | SYSTOLIC BLOOD PRESSURE: 120 MMHG

## 2023-01-16 DIAGNOSIS — D48.5 NEOPLASM OF UNCERTAIN BEHAVIOR OF SKIN OF EAR: ICD-10-CM

## 2023-01-16 DIAGNOSIS — G40.409 OTHER GENERALIZED EPILEPSY, NOT INTRACTABLE, WITHOUT STATUS EPILEPTICUS (HCC): ICD-10-CM

## 2023-01-16 DIAGNOSIS — E66.9 OBESITY (BMI 30-39.9): ICD-10-CM

## 2023-01-16 DIAGNOSIS — R56.9 SEIZURE (HCC): ICD-10-CM

## 2023-01-16 PROCEDURE — 99214 OFFICE O/P EST MOD 30 MIN: CPT | Performed by: INTERNAL MEDICINE

## 2023-01-16 ASSESSMENT — ENCOUNTER SYMPTOMS
EYES NEGATIVE: 1
MUSCULOSKELETAL NEGATIVE: 1
NEUROLOGICAL NEGATIVE: 1
CONSTITUTIONAL NEGATIVE: 1
GASTROINTESTINAL NEGATIVE: 1
PSYCHIATRIC NEGATIVE: 1
CARDIOVASCULAR NEGATIVE: 1
RESPIRATORY NEGATIVE: 1

## 2023-01-16 ASSESSMENT — FIBROSIS 4 INDEX: FIB4 SCORE: 0.29

## 2023-01-16 NOTE — PROGRESS NOTES
"Subjective     Braydon Sotelo is a 27 y.o. male who presents with Follow-Up (Right side of ear thought it was a pimple , its black and blue )  Is a new patient me unable see PCP today.  Complains of a lesion on his right earlobe is been present for only length of time and just noticed by his mother a week or so ago.  It is asymptomatic with no pain or drainage or history of trauma.  They are concerned because the color was dark bluish and they want to rule out a melanoma.  No prior history of skin cancer.    Other problems including his epilepsy managed by neurology is stable.  The patient is trying to follow a Mediterranean high-protein low-carb diet to address his obesity.           HPI    Review of Systems   Constitutional: Negative.    HENT: Negative.     Eyes: Negative.    Respiratory: Negative.     Cardiovascular: Negative.    Gastrointestinal: Negative.    Genitourinary: Negative.    Musculoskeletal: Negative.    Skin: Negative.    Neurological: Negative.    Endo/Heme/Allergies: Negative.    Psychiatric/Behavioral: Negative.              Objective     /70 (BP Location: Right arm, Patient Position: Sitting, BP Cuff Size: Adult)   Pulse 91   Temp 36.9 °C (98.4 °F) (Temporal)   Resp 16   Ht 1.778 m (5' 10\")   Wt 118 kg (261 lb)   SpO2 91%   BMI 37.45 kg/m²      Physical Exam  Constitutional:       General: He is not in acute distress.     Appearance: He is well-developed. He is not diaphoretic.   HENT:      Head: Normocephalic and atraumatic.      Right Ear: External ear normal.      Left Ear: External ear normal.      Nose: Nose normal.      Mouth/Throat:      Pharynx: No oropharyngeal exudate.   Eyes:      General: No scleral icterus.        Right eye: No discharge.         Left eye: No discharge.      Conjunctiva/sclera: Conjunctivae normal.      Pupils: Pupils are equal, round, and reactive to light.   Neck:      Thyroid: No thyromegaly.      Vascular: No JVD.      Trachea: No tracheal " deviation.   Cardiovascular:      Rate and Rhythm: Normal rate and regular rhythm.      Heart sounds: Normal heart sounds. No murmur heard.    No friction rub. No gallop.   Pulmonary:      Effort: Pulmonary effort is normal. No respiratory distress.      Breath sounds: Normal breath sounds. No stridor. No wheezing or rales.   Chest:      Chest wall: No tenderness.   Abdominal:      General: Bowel sounds are normal. There is no distension.      Palpations: Abdomen is soft. There is no mass.      Tenderness: There is no abdominal tenderness. There is no guarding or rebound.   Musculoskeletal:         General: No tenderness. Normal range of motion.      Cervical back: Normal range of motion and neck supple.   Lymphadenopathy:      Cervical: No cervical adenopathy.   Skin:     General: Skin is warm and dry.      Coloration: Skin is not pale.      Findings: Lesion present. No erythema or rash.      Comments: There is a 3 mm raised by subcutaneous nodule that is dark bluish purplish tinged on the external surface of the right earlobe.  It is palpable.  No bleeding.   Neurological:      Mental Status: He is alert and oriented to person, place, and time.      Motor: No abnormal muscle tone.      Coordination: Coordination normal.      Deep Tendon Reflexes: Reflexes are normal and symmetric. Reflexes normal.   Psychiatric:         Behavior: Behavior normal.         Thought Content: Thought content normal.         Judgment: Judgment normal.                           Assessment & Plan        1. Neoplasm of uncertain behavior of skin of ear- right earlobe- r/o melanoma      - Referral to Dermatology    2. Obesity (BMI 30-39.9)     - Patient identified as having weight management issue.  Appropriate orders and counseling given.  - Referral to Nutrition Services    3. Seizure (HCC)  Under good control continue antielliptic medication per neurology.    4. Other generalized epilepsy, not intractable, without status epilepticus  (HCC)        As above

## 2023-02-10 ENCOUNTER — HOSPITAL ENCOUNTER (OUTPATIENT)
Dept: LAB | Facility: MEDICAL CENTER | Age: 28
End: 2023-02-10
Attending: NURSE PRACTITIONER
Payer: MEDICARE

## 2023-02-10 LAB
25(OH)D3 SERPL-MCNC: 31 NG/ML (ref 30–100)
ALBUMIN SERPL BCP-MCNC: 4.3 G/DL (ref 3.2–4.9)
ALBUMIN/GLOB SERPL: 1.7 G/DL
ALP SERPL-CCNC: 81 U/L (ref 30–99)
ALT SERPL-CCNC: 46 U/L (ref 2–50)
ANION GAP SERPL CALC-SCNC: 9 MMOL/L (ref 7–16)
AST SERPL-CCNC: 20 U/L (ref 12–45)
BASOPHILS # BLD AUTO: 0.5 % (ref 0–1.8)
BASOPHILS # BLD: 0.04 K/UL (ref 0–0.12)
BILIRUB SERPL-MCNC: 0.4 MG/DL (ref 0.1–1.5)
BUN SERPL-MCNC: 17 MG/DL (ref 8–22)
CALCIUM ALBUM COR SERPL-MCNC: 8.8 MG/DL (ref 8.5–10.5)
CALCIUM SERPL-MCNC: 9 MG/DL (ref 8.4–10.2)
CHLORIDE SERPL-SCNC: 101 MMOL/L (ref 96–112)
CO2 SERPL-SCNC: 26 MMOL/L (ref 20–33)
CREAT SERPL-MCNC: 1.14 MG/DL (ref 0.5–1.4)
EOSINOPHIL # BLD AUTO: 0.19 K/UL (ref 0–0.51)
EOSINOPHIL NFR BLD: 2.4 % (ref 0–6.9)
ERYTHROCYTE [DISTWIDTH] IN BLOOD BY AUTOMATED COUNT: 39.2 FL (ref 35.9–50)
GFR SERPLBLD CREATININE-BSD FMLA CKD-EPI: 91 ML/MIN/1.73 M 2
GLOBULIN SER CALC-MCNC: 2.6 G/DL (ref 1.9–3.5)
GLUCOSE SERPL-MCNC: 87 MG/DL (ref 65–99)
HCT VFR BLD AUTO: 49 % (ref 42–52)
HGB BLD-MCNC: 16.5 G/DL (ref 14–18)
IMM GRANULOCYTES # BLD AUTO: 0.02 K/UL (ref 0–0.11)
IMM GRANULOCYTES NFR BLD AUTO: 0.2 % (ref 0–0.9)
LYMPHOCYTES # BLD AUTO: 2.83 K/UL (ref 1–4.8)
LYMPHOCYTES NFR BLD: 35.2 % (ref 22–41)
MCH RBC QN AUTO: 29.2 PG (ref 27–33)
MCHC RBC AUTO-ENTMCNC: 33.7 G/DL (ref 33.7–35.3)
MCV RBC AUTO: 86.7 FL (ref 81.4–97.8)
MONOCYTES # BLD AUTO: 0.62 K/UL (ref 0–0.85)
MONOCYTES NFR BLD AUTO: 7.7 % (ref 0–13.4)
NEUTROPHILS # BLD AUTO: 4.34 K/UL (ref 1.82–7.42)
NEUTROPHILS NFR BLD: 54 % (ref 44–72)
NRBC # BLD AUTO: 0 K/UL
NRBC BLD-RTO: 0 /100 WBC
PLATELET # BLD AUTO: 238 K/UL (ref 164–446)
PMV BLD AUTO: 10.1 FL (ref 9–12.9)
POTASSIUM SERPL-SCNC: 4.1 MMOL/L (ref 3.6–5.5)
PROT SERPL-MCNC: 6.9 G/DL (ref 6–8.2)
RBC # BLD AUTO: 5.65 M/UL (ref 4.7–6.1)
SODIUM SERPL-SCNC: 136 MMOL/L (ref 135–145)
WBC # BLD AUTO: 8 K/UL (ref 4.8–10.8)

## 2023-02-10 PROCEDURE — 85025 COMPLETE CBC W/AUTO DIFF WBC: CPT

## 2023-02-10 PROCEDURE — 82306 VITAMIN D 25 HYDROXY: CPT

## 2023-02-10 PROCEDURE — 36415 COLL VENOUS BLD VENIPUNCTURE: CPT

## 2023-02-10 PROCEDURE — 80175 DRUG SCREEN QUAN LAMOTRIGINE: CPT

## 2023-02-10 PROCEDURE — 80053 COMPREHEN METABOLIC PANEL: CPT

## 2023-02-12 LAB — LAMOTRIGINE SERPL-MCNC: 3 UG/ML (ref 3–15)

## 2023-03-03 ENCOUNTER — OFFICE VISIT (OUTPATIENT)
Dept: SLEEP MEDICINE | Facility: MEDICAL CENTER | Age: 28
End: 2023-03-03
Attending: PHYSICIAN ASSISTANT
Payer: MEDICARE

## 2023-03-03 VITALS
OXYGEN SATURATION: 96 % | BODY MASS INDEX: 37.99 KG/M2 | RESPIRATION RATE: 16 BRPM | SYSTOLIC BLOOD PRESSURE: 122 MMHG | HEART RATE: 76 BPM | HEIGHT: 70 IN | WEIGHT: 265.4 LBS | DIASTOLIC BLOOD PRESSURE: 72 MMHG

## 2023-03-03 DIAGNOSIS — E66.9 OBESITY (BMI 30-39.9): ICD-10-CM

## 2023-03-03 DIAGNOSIS — G47.33 OSA (OBSTRUCTIVE SLEEP APNEA): ICD-10-CM

## 2023-03-03 PROCEDURE — 99212 OFFICE O/P EST SF 10 MIN: CPT | Performed by: PHYSICIAN ASSISTANT

## 2023-03-03 PROCEDURE — 99213 OFFICE O/P EST LOW 20 MIN: CPT | Performed by: PHYSICIAN ASSISTANT

## 2023-03-03 RX ORDER — LAMOTRIGINE 200 MG/1
1 TABLET, EXTENDED RELEASE ORAL EVERY MORNING
COMMUNITY
Start: 2023-01-23

## 2023-03-03 RX ORDER — BRIVARACETAM 50 MG/1
50 TABLET, FILM COATED ORAL 2 TIMES DAILY
COMMUNITY
Start: 2023-02-12

## 2023-03-03 RX ORDER — CLONAZEPAM 1 MG/1
1 TABLET ORAL
COMMUNITY
Start: 2023-02-27 | End: 2023-06-07

## 2023-03-03 RX ORDER — CITALOPRAM 20 MG/1
20 TABLET ORAL
COMMUNITY
Start: 2023-02-26

## 2023-03-03 RX ORDER — CLONAZEPAM 1 MG/1
1 TABLET ORAL NIGHTLY
COMMUNITY
Start: 2023-02-17

## 2023-03-03 ASSESSMENT — ENCOUNTER SYMPTOMS
FEVER: 0
SPUTUM PRODUCTION: 0
SHORTNESS OF BREATH: 0
TREMORS: 0
SINUS PAIN: 0
INSOMNIA: 0
COUGH: 0
WHEEZING: 0
PALPITATIONS: 0
CHILLS: 0
SORE THROAT: 0
DIZZINESS: 0
HEARTBURN: 0
WEIGHT LOSS: 0
ORTHOPNEA: 0
HEADACHES: 0

## 2023-03-03 ASSESSMENT — FIBROSIS 4 INDEX: FIB4 SCORE: 0.32

## 2023-03-03 NOTE — PATIENT INSTRUCTIONS
1-reviewed compliance report  2-needs to increase daily use  3-is having a mask leak  4-request change to Preferred  5-mask fitting  6-uses ionic  (tubing only)  7-change equipment as often as should be  8-follow up in 6 months

## 2023-03-03 NOTE — PROGRESS NOTES
"CC: KACY    HPI:  Braydon Sotelo is a 27 y.o. year old male here today for follow-up on obstructive sleep apnea.  Patient is accompanied by his fathe who assists in his care.  He is a never smoker.  Last seen in clinic by Dr. Le in 2020.  History of autism spectrum disorder.    Pertinent past medical history includes epilepsy, autism, anxiety disorder.  He does have a history of cough, allergies for which he uses albuterol 3-4 times a day and Spiriva.  This is managed by his primary.    He does nap during the day, he does sleep longer than 4 hours at night but removes his mask in his sleep.  No complaints of morning headache.    Reviewed in clinic vitals  /72 (BP Location: Left arm, Patient Position: Sitting, BP Cuff Size: Large adult)   Pulse 76   Resp 16   Ht 1.778 m (5' 10\")   Wt 120 kg (265 lb 6.4 oz)   SpO2 96% on room air.    Reviewed home medication regimen including Advair 100 mcg, loratadine.  Patient does not require oxygen with CPAP.    No chest imaging on file.  No pulmonary function testing on file.    Polysomnogram obtained 7/14/2019 demonstrated mild obstructive sleep apnea with AHI of 14.8/h and low O2 sat of 83%.  Note 27% of the apneas were central apneas.  CPAP of 11 cm H2O pressure was recommended.    Reviewed compliance on a ResMed air sense 10 CPAP of 11 cm H2O pressure, dated 2/1/2023 through 3/2/2023 demonstrating 23 out of 30 days used or 77%, 4 hours daily usage goal needs to be met currently 13 days or 43%.     Reviewed therapeutic goals with patient and his father.  Discussed strategies to meet therapeutic goals.  States benefit with therapy.  Average daily usage of 3 hours and 53 minutes.  Apneas were well controlled with AHI of 1.7.  Patient is experiencing a moderate mask leak at 23 L/min 95th percentile.    They use a ionic  for tubing, mask and humidifier should be cleaned with mild soap and water.  Encouraged changing equipment as allowed by insurance.  " Order placed for mask fitting.  Encouraged regular sleep schedule.    Review of Systems   Constitutional:  Positive for malaise/fatigue. Negative for chills, fever and weight loss.   HENT:  Negative for congestion, hearing loss, nosebleeds, sinus pain, sore throat and tinnitus.    Eyes:         Presc glasses   Respiratory:  Negative for cough, sputum production, shortness of breath and wheezing.    Cardiovascular:  Negative for chest pain, palpitations, orthopnea and leg swelling.   Gastrointestinal:  Negative for heartburn.        No dentures, no swallowing problems    Neurological:  Negative for dizziness, tremors and headaches.   Psychiatric/Behavioral:  The patient does not have insomnia.      Past Medical History:   Diagnosis Date    Autism     Autism spectrum disorder     Seizure (HCC)     since 2013       Past Surgical History:   Procedure Laterality Date    OTHER  1/10/2014    wisdom teeth extraction    TONSILLECTOMY      TONSILLECTOMY AND ADENOIDECTOMY         Family History   Problem Relation Age of Onset    No Known Problems Mother     Sleep Apnea Father     Hypertension Father     Obesity Father     Lung Disease Paternal Aunt     Lung Disease Paternal Grandmother     No Known Problems Maternal Grandmother     Diabetes Maternal Grandfather     Hyperlipidemia Paternal Grandfather        Social History     Socioeconomic History    Marital status: Single     Spouse name: Not on file    Number of children: Not on file    Years of education: Not on file    Highest education level: Not on file   Occupational History    Not on file   Tobacco Use    Smoking status: Never    Smokeless tobacco: Never   Vaping Use    Vaping Use: Never used   Substance and Sexual Activity    Alcohol use: No     Alcohol/week: 0.0 oz    Drug use: No    Sexual activity: Not Currently   Other Topics Concern    Not on file   Social History Narrative    Not on file     Social Determinants of Health     Financial Resource Strain: Not on  file   Food Insecurity: Not on file   Transportation Needs: Not on file   Physical Activity: Not on file   Stress: Not on file   Social Connections: Not on file   Intimate Partner Violence: Not on file   Housing Stability: Not on file       Allergies as of 03/03/2023    (No Known Allergies)          Current medications as of today   Current Outpatient Medications   Medication Sig Dispense Refill    BRIVIACT 50 MG Tab tablet Take 50 mg by mouth 2 times a day.      citalopram (CELEXA) 20 MG Tab Take 20 mg by mouth at bedtime.      clonazePAM (KLONOPIN) 0.5 MG Tab TAKE 2 TABLETS BY MOUTH ONCE DAILY AT BEDTIME      LamoTRIgine 200 MG TABLET SR 24 HR Take 1 Tablet by mouth every morning.      vitamin D2, Ergocalciferol, (DRISDOL) 1.25 MG (53688 UT) Cap capsule Take 1 capsule by mouth once a week 5 Capsule 0    loratadine (CLARITIN) 10 MG Tab Take 1 Tablet by mouth every day. 30 Tablet 2    clonazePAM (KLONOPIN) 1 MG Tab Take 1 mg by mouth at bedtime. (Patient not taking: Reported on 3/3/2023)      fluticasone-salmeterol (ADVAIR) 100-50 MCG/DOSE AEROSOL POWDER, BREATH ACTIVATED Inhale 1 Puff every 12 hours. (Patient not taking: Reported on 3/3/2023) 1 Each 5     No current facility-administered medications for this visit.         Physical Exam:   Gen:           Alert and oriented, No apparent distress. Mood and affect appropriate, normal interaction with provider.  Eyes:          sclere white, conjunctive moist.  Hearing:     Grossly intact.  Dentition:    Fair dentition.  Oropharynx:   Mallampati  II-III.  Tongue normal, posterior pharynx without erythema or exudate.  Neck:        Supple, trachea midline, no masses.  Respiratory Effort: No intercostal retractions or use of accessory muscles.   Lung Auscultation:      Clear to auscultation bilaterally; no rales, rhonchi or wheezing.  CV:            Regular rate and rhythm. No edema. No murmurs, rubs or gallops.  Digits, Nails, Ext: No clubbing, cyanosis, petechiae, or  nodes.   Skin:        No rashes, lesions or ulcers noted on exposed skin surfaces.                     Assessment:  1. KACY (obstructive sleep apnea)  DME Mask Fitting          Immunizations:    Flu: 10/6/2022  PCV 20: Recommended   Pfizer booster SARS CoV2 vaccine: 12/7/2022, 10/6/2022  [place] SARS-CoV-2 vaccine: 12/4/2021, 3/9/2021      Plan:  27-year-old male here to follow-up on obstructive sleep apnea.    KACY: Management of his chronic condition is complicated by autism spectrum disorder, reviewed barriers to meeting therapeutic goals, reviewed equipment cleaning, he does have a significant mask leak will send orders for mask fitting to preferred per family choice.  Reviewed frequency of equipment replacement.  Primary goal is to increase daily use time.  Stated understanding.    Elevated BMI: Discussion regarding impact central adiposity on pulmonary function.  Encouraged to increase activity as tolerated and monitor nutritional intake.      Patient to follow-up in 6 months, sooner if needed.       dictation was created using voice recognition software. The accuracy of the dictation is limited to the abilities of the software. I expect there may be some errors of grammar and possibly content.

## 2023-04-17 ENCOUNTER — TELEPHONE (OUTPATIENT)
Dept: HEALTH INFORMATION MANAGEMENT | Facility: OTHER | Age: 28
End: 2023-04-17
Payer: MEDICARE

## 2023-06-07 ENCOUNTER — OFFICE VISIT (OUTPATIENT)
Dept: MEDICAL GROUP | Age: 28
End: 2023-06-07
Payer: MEDICARE

## 2023-06-07 VITALS
HEART RATE: 77 BPM | TEMPERATURE: 97.7 F | HEIGHT: 69 IN | OXYGEN SATURATION: 92 % | DIASTOLIC BLOOD PRESSURE: 78 MMHG | SYSTOLIC BLOOD PRESSURE: 120 MMHG | WEIGHT: 266 LBS | BODY MASS INDEX: 39.4 KG/M2

## 2023-06-07 DIAGNOSIS — G40.001 PARTIAL IDIOPATHIC EPILEPSY WITH SEIZURES OF LOCALIZED ONSET, NOT INTRACTABLE, WITH STATUS EPILEPTICUS (HCC): ICD-10-CM

## 2023-06-07 DIAGNOSIS — Z11.59 NEED FOR HEPATITIS C SCREENING TEST: ICD-10-CM

## 2023-06-07 DIAGNOSIS — G47.33 OSA (OBSTRUCTIVE SLEEP APNEA): ICD-10-CM

## 2023-06-07 DIAGNOSIS — F84.0 AUTISM SPECTRUM DISORDER: ICD-10-CM

## 2023-06-07 DIAGNOSIS — E66.9 OBESITY (BMI 30-39.9): ICD-10-CM

## 2023-06-07 DIAGNOSIS — F06.4 ANXIETY DISORDER DUE TO KNOWN PHYSIOLOGICAL CONDITION: ICD-10-CM

## 2023-06-07 DIAGNOSIS — Z00.00 MEDICARE ANNUAL WELLNESS VISIT, INITIAL: ICD-10-CM

## 2023-06-07 DIAGNOSIS — E78.00 PURE HYPERCHOLESTEROLEMIA: ICD-10-CM

## 2023-06-07 PROCEDURE — 3074F SYST BP LT 130 MM HG: CPT | Performed by: FAMILY MEDICINE

## 2023-06-07 PROCEDURE — G0439 PPPS, SUBSEQ VISIT: HCPCS | Performed by: FAMILY MEDICINE

## 2023-06-07 PROCEDURE — 3078F DIAST BP <80 MM HG: CPT | Performed by: FAMILY MEDICINE

## 2023-06-07 RX ORDER — PHENTERMINE HYDROCHLORIDE 30 MG/1
30 CAPSULE ORAL EVERY MORNING
Qty: 30 CAPSULE | Refills: 0 | Status: SHIPPED | OUTPATIENT
Start: 2023-06-07 | End: 2023-07-05

## 2023-06-07 ASSESSMENT — ENCOUNTER SYMPTOMS: GENERAL WELL-BEING: GOOD

## 2023-06-07 ASSESSMENT — ACTIVITIES OF DAILY LIVING (ADL): BATHING_REQUIRES_ASSISTANCE: 0

## 2023-06-07 ASSESSMENT — PATIENT HEALTH QUESTIONNAIRE - PHQ9
5. POOR APPETITE OR OVEREATING: 2 - MORE THAN HALF THE DAYS
CLINICAL INTERPRETATION OF PHQ2 SCORE: 0

## 2023-06-07 ASSESSMENT — FIBROSIS 4 INDEX: FIB4 SCORE: 0.32

## 2023-06-07 NOTE — PROGRESS NOTES
Chief Complaint   Patient presents with    Annual Exam     annual       HPI:  Braydon is a 27 y.o. here for Medicare Annual Wellness Visit        Patient Active Problem List    Diagnosis Date Noted    Seizure (HCC)     Cough 08/13/2021    KACY (obstructive sleep apnea) 04/23/2020    Otitis of both ears 03/28/2019    Obesity (BMI 30-39.9) 05/25/2017    Anxiety disorder 03/22/2017    Autism 10/04/2016    Autism spectrum disorder 01/05/2015    Epilepsy (HCC) 01/05/2015       Current Outpatient Medications   Medication Sig Dispense Refill    phentermine 30 MG capsule Take 1 Capsule by mouth every morning for 30 days. 30 Capsule 0    BRIVIACT 50 MG Tab tablet Take 50 mg by mouth 2 times a day.      citalopram (CELEXA) 20 MG Tab Take 20 mg by mouth at bedtime.      clonazePAM (KLONOPIN) 0.5 MG Tab TAKE 2 TABLETS BY MOUTH ONCE DAILY AT BEDTIME      LamoTRIgine 200 MG TABLET SR 24 HR Take 1 Tablet by mouth every morning.      vitamin D2, Ergocalciferol, (DRISDOL) 1.25 MG (46291 UT) Cap capsule Take 1 capsule by mouth once a week 5 Capsule 0    loratadine (CLARITIN) 10 MG Tab Take 1 Tablet by mouth every day. 30 Tablet 2     No current facility-administered medications for this visit.           Current supplements as per medication list.     Allergies: Patient has no known allergies.    Current social contact/activities: flag football, movies with friends, training that helps with financial and adulting things.     Is patient current with immunizations? Yes.    He  reports that he has never smoked. He has never used smokeless tobacco. He reports that he does not drink alcohol and does not use drugs.  Counseling given: Not Answered      ROS:    Gait: Uses no assistive device   Ostomy: No   Other tubes:    Amputations: No   Chronic oxygen use No   Last eye exam: 2022   Wears hearing aids: No   : Denies any urinary leakage during the last 6 months    Screening:    Depression Screening  Little interest or pleasure in doing  things?  0 - not at all  Feeling down, depressed, or hopeless? 0 - not at all  Patient Health Questionnaire Score: 0    If depressive symptoms identified deferred to follow up visit unless specifically addressed in assessment and plan.    Interpretation of PHQ-9 Total Score   Score Severity   1-4 No Depression   5-9 Mild Depression   10-14 Moderate Depression   15-19 Moderately Severe Depression   20-27 Severe Depression    Screening for Cognitive Impairment  Three Minute Recall (daughter, teto, chapo)  0/3    Cristopher clock face with all 12 numbers and set the hands to show 10 past 11.  Yes    If cognitive concerns identified, deferred for follow up unless specifically addressed in assessment and plan.    Fall Risk Assessment  Has the patient had two or more falls in the last year or any fall with injury in the last year?  No  If fall risk identified, deferred for follow up unless specifically addressed in assessment and plan.    Safety Assessment  Throw rugs on floor.  Yes  Handrails on all stairs.  Yes  Good lighting in all hallways.  Yes  Difficulty hearing.  No  Patient counseled about all safety risks that were identified.    Functional Assessment ADLs  Are there any barriers preventing you from cooking for yourself or meeting nutritional needs?  No.    Are there any barriers preventing you from driving safely or obtaining transportation?  No. Pt doesn't drive but can find transportation.   Are there any barriers preventing you from using a telephone or calling for help?  No.    Are there any barriers preventing you from shopping?  No.    Are there any barriers preventing you from taking care of your own finances?  No.    Are there any barriers preventing you from managing your medications?  No.    Are there any barriers preventing you from showering, bathing or dressing yourself?  No.    Are you currently engaging in any exercise or physical activity?  Yes.     What is your perception of your health?   Good.    Advance Care Planning  Do you have an Advance Directive, Living Will, Durable Power of , or POLST? No               Health Maintenance Summary            Ordered - HEPATITIS C SCREENING (Once) Ordered on 6/7/2023      No completion history exists for this topic.              Annual Wellness Visit (Every 366 Days) Next due on 6/7/2024 06/07/2023  Visit Dx: Medicare annual wellness visit, initial              IMM DTaP/Tdap/Td Vaccine (8 - Td or Tdap) Next due on 8/2/2029 08/02/2019  Imm Admin: Tdap Vaccine    04/08/2008  Imm Admin: Tdap Vaccine    09/13/2000  Imm Admin: Dtap Vaccine    09/15/1997  Imm Admin: Dtap Vaccine    1996  Imm Admin: DTP/HIB Combined Vaccine - HISTORICAL DATA    Only the first 5 history entries have been loaded, but more history exists.              IMM HEP B VACCINE (Series Information) Completed      03/08/2002  Imm Admin: Hepatitis B Vaccine Adolescent/Pediatric    1996  Imm Admin: Hepatitis B Vaccine Adolescent/Pediatric    1996  Imm Admin: Hepatitis B Vaccine Adolescent/Pediatric    1996  Imm Admin: Hepatitis B Vaccine Adolescent/Pediatric              IMM HEP A VACCINE (Series Information) Completed      04/16/2004  Imm Admin: Hepatitis A Vaccine, Ped/Adol    08/15/2002  Imm Admin: Hepatitis A Vaccine, Ped/Adol              IMM VARICELLA (CHICKENPOX) VACCINE (Series Information) Completed      05/05/2008  Imm Admin: Varicella Vaccine Live    06/18/1997  Imm Admin: Varicella Vaccine Live              IMM MENINGOCOCCAL ACWY VACCINE (Series Information) Aged Out      05/24/2017  Imm Admin: Meningococcal Conjugate Vaccine MCV4 (Menactra)    05/05/2008  Imm Admin: Meningococcal Conjugate Vaccine MCV4 (MENVEO)    05/05/2008  Imm Admin: Meningococcal Conjugate Vaccine MCV4 (Menactra)              HPV Vaccines (Series Information) Completed      12/27/2017  Imm Admin: 9VHPV VACCINE 2-3 DOSE IM (GARDASIL 9)    07/26/2017  Imm Admin: 9VHPV VACCINE  2-3 DOSE IM (GARDASIL 9)    05/24/2017  Imm Admin: 9VHPV VACCINE 2-3 DOSE IM (GARDASIL 9)              IMM INFLUENZA (Series Information) Completed      10/06/2022  Imm Admin: Influenza Vaccine Quad Inj (Pf)    10/09/2021  Imm Admin: Influenza Vaccine Quad Inj (Pf)    09/05/2020  Imm Admin: Influenza Vaccine Quad Inj (Pf)    10/24/2019  Imm Admin: Influenza Vaccine Quad Inj (Pf)    10/22/2019  Imm Admin: Influenza Vaccine Quad Inj (Pf)    Only the first 5 history entries have been loaded, but more history exists.              COVID-19 Vaccine (Series Information) Completed      12/07/2022  Imm Admin: PFIZER BIVALENT BOOSTER SARS-COV-2 VACCINE (12+)    10/06/2022  Imm Admin: PFIZER BIVALENT BOOSTER SARS-COV-2 VACCINE (12+)    12/04/2021  Imm Admin: [place] SARS-CoV-2 Vaccine    03/09/2021  Imm Admin: [place] SARS-CoV-2 Vaccine              IMM PNEUMOCOCCAL VACCINE: 0-64 Years (Series Information) Aged Out      No completion history exists for this topic.                    Patient Care Team:  Rajat Burciaga M.D. as PCP - General (Family Medicine)  Melissa P Bloch, M.D. as Consulting Physician (Neurology)  Carolyn Thakkar P.A.-C. as Pulmonologist (Physician Assistant)  Preferred (DME Supplier)    Social History     Tobacco Use    Smoking status: Never    Smokeless tobacco: Never   Vaping Use    Vaping Use: Never used   Substance Use Topics    Alcohol use: No     Alcohol/week: 0.0 oz    Drug use: No     Family History   Problem Relation Age of Onset    No Known Problems Mother     Sleep Apnea Father     Hypertension Father     Obesity Father     Lung Disease Paternal Aunt     Lung Disease Paternal Grandmother     No Known Problems Maternal Grandmother     Diabetes Maternal Grandfather     Hyperlipidemia Paternal Grandfather      He  has a past medical history of Autism, Autism spectrum disorder, and Seizure (HCC).   Past Surgical History:   Procedure Laterality Date    OTHER  1/10/2014    wisdom teeth extraction     "TONSILLECTOMY      TONSILLECTOMY AND ADENOIDECTOMY         Exam:   /78 (BP Location: Left arm, Patient Position: Sitting, BP Cuff Size: Adult long)   Pulse 77   Temp 36.5 °C (97.7 °F) (Temporal)   Ht 1.753 m (5' 9\")   Wt 121 kg (266 lb)   SpO2 92%  Body mass index is 39.28 kg/m².    Hearing excellent.    Dentition good  Alert, oriented in no acute distress.  Eye contact is good, speech goal directed, affect calm  yes    Assessment and Plan. The following treatment and monitoring plan is recommended:    1. Medicare annual wellness visit, initial    2. Pure hypercholesterolemia  - Lipid Profile; Future  - TSH+FREE T4  - T3 FREE; Future    3. Need for hepatitis C screening test  - HEP C VIRUS ANTIBODY; Future    4. Autism spectrum disorder    5. Anxiety disorder due to known physiological condition    6. Partial idiopathic epilepsy with seizures of localized onset, not intractable, with status epilepticus (HCC)    7. KACY (obstructive sleep apnea)    8. Obesity (BMI 30-39.9)  - phentermine 30 MG capsule; Take 1 Capsule by mouth every morning for 30 days.  Dispense: 30 Capsule; Refill: 0     PLAN    Completed    2. We will obtain new labs to update clinical profile.  Then we will adjust therapy as needed.    3. We will obtain new labs to update clinical profile.  Then we will adjust therapy as needed.    4. Patient has been stable with current management  We will make no changes for now    5. Patient has been stable with current management  We will make no changes for now    6. Patient has been stable with current management  We will make no changes for now    7. Patient has been stable with current management  We will make no changes for now    8. Starting weight --266 pounds on June 7, 2023    Phentermine does not adversely interact with his epileptic medication    We discussed all side effects of phentermine including but not limited to Hypertension, ischemia, palpitations, tachycardia, Dizziness, " dysphoria, euphoria, headache, insomnia, overstimulation, psychosis, restlessness, Urticaria, Change in libido, Constipation, diarrhea, gastrointestinal distress, unpleasant taste, xerostomi,  Impotence   Acquired valvular heart disease (regurgitant), primary pulmonary hypertension    Services suggested: No services needed at this time  Health Care Screening recommendations as per orders if indicated.  Referrals offered: PT/OT/Nutrition counseling/Behavioral Health/Smoking cessation as per orders if indicated.    Discussion today about general wellness and lifestyle habits:    Prevent falls and reduce trip hazards; Cautioned about securing or removing rugs.  Have a working fire alarm and carbon monoxide detector;   Engage in regular physical activity and social activities     Follow-up: Return in about 4 weeks (around 7/5/2023) for Medication refill, Discussing tollerance and efficacy of medication.

## 2023-06-26 ENCOUNTER — HOSPITAL ENCOUNTER (OUTPATIENT)
Dept: LAB | Facility: MEDICAL CENTER | Age: 28
End: 2023-06-26
Attending: FAMILY MEDICINE
Payer: MEDICARE

## 2023-06-26 DIAGNOSIS — E78.00 PURE HYPERCHOLESTEROLEMIA: ICD-10-CM

## 2023-06-26 DIAGNOSIS — Z11.59 NEED FOR HEPATITIS C SCREENING TEST: ICD-10-CM

## 2023-06-26 LAB
CHOLEST SERPL-MCNC: 170 MG/DL (ref 100–199)
FASTING STATUS PATIENT QL REPORTED: NORMAL
HCV AB SER QL: NORMAL
HDLC SERPL-MCNC: 31 MG/DL
LDLC SERPL CALC-MCNC: 68 MG/DL
T3FREE SERPL-MCNC: 3.1 PG/ML (ref 2–4.4)
T4 FREE SERPL-MCNC: 0.9 NG/DL (ref 0.93–1.7)
TRIGL SERPL-MCNC: 357 MG/DL (ref 0–149)
TSH SERPL DL<=0.005 MIU/L-ACNC: 4.57 UIU/ML (ref 0.38–5.33)

## 2023-06-26 PROCEDURE — 86803 HEPATITIS C AB TEST: CPT

## 2023-06-26 PROCEDURE — 84439 ASSAY OF FREE THYROXINE: CPT

## 2023-06-26 PROCEDURE — 36415 COLL VENOUS BLD VENIPUNCTURE: CPT

## 2023-06-26 PROCEDURE — 84481 FREE ASSAY (FT-3): CPT

## 2023-06-26 PROCEDURE — 80061 LIPID PANEL: CPT

## 2023-06-26 PROCEDURE — 84443 ASSAY THYROID STIM HORMONE: CPT

## 2023-07-02 DIAGNOSIS — E66.9 OBESITY (BMI 30-39.9): ICD-10-CM

## 2023-07-05 RX ORDER — PHENTERMINE HYDROCHLORIDE 30 MG/1
CAPSULE ORAL
Qty: 30 CAPSULE | Refills: 0 | Status: SHIPPED | OUTPATIENT
Start: 2023-07-05 | End: 2023-07-07 | Stop reason: SDUPTHER

## 2023-07-07 ENCOUNTER — OFFICE VISIT (OUTPATIENT)
Dept: MEDICAL GROUP | Age: 28
End: 2023-07-07
Payer: MEDICARE

## 2023-07-07 VITALS
DIASTOLIC BLOOD PRESSURE: 68 MMHG | WEIGHT: 259 LBS | SYSTOLIC BLOOD PRESSURE: 104 MMHG | OXYGEN SATURATION: 93 % | BODY MASS INDEX: 37.08 KG/M2 | TEMPERATURE: 98.2 F | HEIGHT: 70 IN | HEART RATE: 83 BPM

## 2023-07-07 DIAGNOSIS — E78.2 MIXED HYPERLIPIDEMIA: ICD-10-CM

## 2023-07-07 DIAGNOSIS — E66.9 OBESITY (BMI 30-39.9): ICD-10-CM

## 2023-07-07 PROCEDURE — 3074F SYST BP LT 130 MM HG: CPT | Performed by: FAMILY MEDICINE

## 2023-07-07 PROCEDURE — 3078F DIAST BP <80 MM HG: CPT | Performed by: FAMILY MEDICINE

## 2023-07-07 PROCEDURE — 99214 OFFICE O/P EST MOD 30 MIN: CPT | Performed by: FAMILY MEDICINE

## 2023-07-07 RX ORDER — GEMFIBROZIL 600 MG/1
600 TABLET, FILM COATED ORAL 2 TIMES DAILY
Qty: 180 TABLET | Refills: 0 | Status: SHIPPED | OUTPATIENT
Start: 2023-07-07 | End: 2023-11-07 | Stop reason: SDUPTHER

## 2023-07-07 RX ORDER — PHENTERMINE HYDROCHLORIDE 30 MG/1
CAPSULE ORAL
Qty: 90 CAPSULE | Refills: 0 | Status: SHIPPED | OUTPATIENT
Start: 2023-07-07 | End: 2023-10-06 | Stop reason: SDUPTHER

## 2023-07-07 ASSESSMENT — FIBROSIS 4 INDEX: FIB4 SCORE: 0.33

## 2023-07-07 NOTE — PROGRESS NOTES
This medical record contains text that has been entered with the assistance of computer voice recognition and dictation software.  Therefore, it may contain unintended errors in text, spelling, punctuation, or grammar      Chief Complaint   Patient presents with    Lab Results    Follow-Up     Weight loss pills          Braydon Sotelo is a 28 y.o. male here evaluation and management of: Follow-up on weight management and labs      HPI:           1. Obesity (BMI 30-39.9)  Braydon was started on phentermine about a month ago.  Overall he has had good results he is lost 7 pounds on our scale.  He has not had any side effect such as palpitations, anxiety no insomnia no new rashes no seizures, no chest pain no fevers chills or night sweats.    2. Mixed hyperlipidemia  NEW UNDIAGNOSED PROBLEM       Latest Reference Range & Units 06/26/23 07:47   Cholesterol,Tot 100 - 199 mg/dL 170   Triglycerides 0 - 149 mg/dL 357 (H)   HDL >=40 mg/dL 31 !   LDL <100 mg/dL 68   (H): Data is abnormally high  !: Data is abnormal  Current medicines (including changes today)  Current Outpatient Medications   Medication Sig Dispense Refill    phentermine 30 MG capsule TAKE 1 CAPSULE BY MOUTH IN THE MORNING FOR 30 DAYS 90 Capsule 0    gemfibrozil (LOPID) 600 MG Tab Take 1 Tablet by mouth 2 times a day. 180 Tablet 0    BRIVIACT 50 MG Tab tablet Take 50 mg by mouth 2 times a day.      citalopram (CELEXA) 20 MG Tab Take 20 mg by mouth at bedtime.      clonazePAM (KLONOPIN) 0.5 MG Tab TAKE 2 TABLETS BY MOUTH ONCE DAILY AT BEDTIME      LamoTRIgine 200 MG TABLET SR 24 HR Take 1 Tablet by mouth every morning.      vitamin D2, Ergocalciferol, (DRISDOL) 1.25 MG (73994 UT) Cap capsule Take 1 capsule by mouth once a week 5 Capsule 0    loratadine (CLARITIN) 10 MG Tab Take 1 Tablet by mouth every day. 30 Tablet 2     No current facility-administered medications for this visit.     He  has a past medical history of Autism, Autism spectrum disorder,  "and Seizure (HCC).  He  has a past surgical history that includes tonsillectomy and adenoidectomy; other (1/10/2014); and tonsillectomy.  Social History     Tobacco Use    Smoking status: Never    Smokeless tobacco: Never   Vaping Use    Vaping Use: Never used   Substance Use Topics    Alcohol use: No     Alcohol/week: 0.0 oz    Drug use: No     Social History     Social History Narrative    Not on file     Family History   Problem Relation Age of Onset    No Known Problems Mother     Sleep Apnea Father     Hypertension Father     Obesity Father     Lung Disease Paternal Aunt     Lung Disease Paternal Grandmother     No Known Problems Maternal Grandmother     Diabetes Maternal Grandfather     Hyperlipidemia Paternal Grandfather      Family Status   Relation Name Status    Mo  Alive    Fa  Alive    PAunt      PGMo      MGMo      MGFa      PGFa           ROS    The pertinent  ROS findings can be seen in the HPI above.     All other systems reviewed and are negative     Objective:     /68 (BP Location: Left arm, Patient Position: Sitting, BP Cuff Size: Large adult long)   Pulse 83   Temp 36.8 °C (98.2 °F) (Temporal)   Ht 1.778 m (5' 10\")   Wt 117 kg (259 lb)   SpO2 93%  Body mass index is 37.16 kg/m².      Physical Exam:    Constitutional: Alert, no distress.  Skin: No suspicious lesions  Eye: Equal, round and reactive, conjunctiva clear, lids normal.  ENMT: Lips without lesions, good dentition, oropharynx clear.  Neck: Trachea midline, no masses, no thyromegaly. No cervical or supraclavicular lymphadenopathy.  Respiratory: Unlabored respiratory effort, lungs clear to auscultation, no wheezes, no ronchi.  Cardiovascular: Normal S1, S2, no murmur, no edema  Abdomen: Soft, non-tender, no masses, no hepatosplenomegaly.        Assessment and Plan:   The following treatment plan was discussed    All recent labs and provider notes reviewed    1. Obesity (BMI " 30-39.9)    Starting weight --266 pounds on June 7, 2023    Today's weight --259 pounds on July 7, 2023    We discussed all side effects of phentermine including but not limited to Hypertension, ischemia, palpitations, tachycardia, Dizziness, dysphoria, euphoria, headache, insomnia, overstimulation, psychosis, restlessness, Urticaria, Change in libido, Constipation, diarrhea, gastrointestinal distress, unpleasant taste, xerostomi,  Impotence   Acquired valvular heart disease (regurgitant), primary pulmonary hypertension    - phentermine 30 MG capsule; TAKE 1 CAPSULE BY MOUTH IN THE MORNING FOR 30 DAYS  Dispense: 90 Capsule; Refill: 0    2. Mixed hyperlipidemia    Begin gemfibrozil  Repeat labs in 3 months    - gemfibrozil (LOPID) 600 MG Tab; Take 1 Tablet by mouth 2 times a day.  Dispense: 180 Tablet; Refill: 0  - Lipid Profile; Future  - Comp Metabolic Panel; Future             Instructed to Follow up in clinic or ER for worsening symptoms, difficulty breathing, lack of expected recovery, or should new symptoms or problems arise.    Followup: Return in about 3 months (around 10/7/2023) for Reevaluation, labs.

## 2023-09-11 ENCOUNTER — APPOINTMENT (OUTPATIENT)
Dept: SLEEP MEDICINE | Facility: MEDICAL CENTER | Age: 28
End: 2023-09-11
Attending: PHYSICIAN ASSISTANT
Payer: MEDICARE

## 2023-10-06 ENCOUNTER — OFFICE VISIT (OUTPATIENT)
Dept: MEDICAL GROUP | Age: 28
End: 2023-10-06
Payer: MEDICARE

## 2023-10-06 VITALS
HEIGHT: 70 IN | BODY MASS INDEX: 34.22 KG/M2 | WEIGHT: 239 LBS | SYSTOLIC BLOOD PRESSURE: 100 MMHG | TEMPERATURE: 97.7 F | DIASTOLIC BLOOD PRESSURE: 76 MMHG | OXYGEN SATURATION: 96 % | HEART RATE: 81 BPM

## 2023-10-06 DIAGNOSIS — E66.9 OBESITY (BMI 30-39.9): ICD-10-CM

## 2023-10-06 DIAGNOSIS — Z23 NEED FOR VACCINATION: ICD-10-CM

## 2023-10-06 PROCEDURE — 3078F DIAST BP <80 MM HG: CPT | Performed by: FAMILY MEDICINE

## 2023-10-06 PROCEDURE — 3074F SYST BP LT 130 MM HG: CPT | Performed by: FAMILY MEDICINE

## 2023-10-06 PROCEDURE — 99214 OFFICE O/P EST MOD 30 MIN: CPT | Mod: 25 | Performed by: FAMILY MEDICINE

## 2023-10-06 PROCEDURE — G0008 ADMIN INFLUENZA VIRUS VAC: HCPCS | Performed by: FAMILY MEDICINE

## 2023-10-06 PROCEDURE — 90686 IIV4 VACC NO PRSV 0.5 ML IM: CPT | Performed by: FAMILY MEDICINE

## 2023-10-06 RX ORDER — PHENTERMINE HYDROCHLORIDE 30 MG/1
CAPSULE ORAL
Qty: 90 CAPSULE | Refills: 0 | Status: SHIPPED | OUTPATIENT
Start: 2023-10-06 | End: 2023-12-29

## 2023-10-06 ASSESSMENT — FIBROSIS 4 INDEX: FIB4 SCORE: 0.33

## 2023-10-06 NOTE — PROGRESS NOTES
"This medical record contains text that has been entered with the assistance of computer voice recognition and dictation software.  Therefore, it may contain unintended errors in text, spelling, punctuation, or grammar      Chief Complaint   Patient presents with    Weight Check         Braydon Sotelo is a 28 y.o. male here evaluation and management of: Management      HPI:           1. Obesity (BMI 30-39.9)  We started Braydon on phentermine he had good results.  He returns today after 3 months of use.  Overall has been tolerating just fine, denies any palpitations, no anxiety, no insomnia.  The patient started off with 265 pounds today he is 239 pounds.  He started taking phentermine on June 7 his weight at that time was 266 pounds, today's weight is 239 pounds.     Vitals      Weight Height BMI         6/7/2023 266  1.753 m (5' 9\")  39.28 kg/m2    7/7/2023 259  1.778 m (5' 10\")  37.16 kg/m2    10/6/2023 239  1.778 m (5' 10\")  34.29 kg/m2          2. Need for vaccination  He is due for his flu vaccine    Current medicines (including changes today)  Current Outpatient Medications   Medication Sig Dispense Refill    phentermine 30 MG capsule TAKE 1 CAPSULE BY MOUTH IN THE MORNING FOR 30 DAYS 90 Capsule 0    gemfibrozil (LOPID) 600 MG Tab Take 1 Tablet by mouth 2 times a day. 180 Tablet 0    BRIVIACT 50 MG Tab tablet Take 50 mg by mouth 2 times a day.      citalopram (CELEXA) 20 MG Tab Take 20 mg by mouth at bedtime.      clonazePAM (KLONOPIN) 0.5 MG Tab TAKE 2 TABLETS BY MOUTH ONCE DAILY AT BEDTIME      LamoTRIgine 200 MG TABLET SR 24 HR Take 1 Tablet by mouth every morning.      vitamin D2, Ergocalciferol, (DRISDOL) 1.25 MG (29895 UT) Cap capsule Take 1 capsule by mouth once a week 5 Capsule 0    loratadine (CLARITIN) 10 MG Tab Take 1 Tablet by mouth every day. 30 Tablet 2     No current facility-administered medications for this visit.     He  has a past medical history of Autism, Autism spectrum disorder, " "and Seizure (HCC).  He  has a past surgical history that includes tonsillectomy and adenoidectomy; other (1/10/2014); and tonsillectomy.  Social History     Tobacco Use    Smoking status: Never    Smokeless tobacco: Never   Vaping Use    Vaping Use: Never used   Substance Use Topics    Alcohol use: No     Alcohol/week: 0.0 oz    Drug use: No     Social History     Social History Narrative    Not on file     Family History   Problem Relation Age of Onset    No Known Problems Mother     Sleep Apnea Father     Hypertension Father     Obesity Father     Lung Disease Paternal Aunt     Lung Disease Paternal Grandmother     No Known Problems Maternal Grandmother     Diabetes Maternal Grandfather     Hyperlipidemia Paternal Grandfather      Family Status   Relation Name Status    Mo  Alive    Fa  Alive    PAunt      PGMo      MGMo      MGFa      PGFa           ROS    The pertinent  ROS findings can be seen in the HPI above.     All other systems reviewed and are negative     Objective:     /76 (BP Location: Right arm, Patient Position: Sitting, BP Cuff Size: Adult long)   Pulse 81   Temp 36.5 °C (97.7 °F) (Temporal)   Ht 1.778 m (5' 10\")   Wt 108 kg (239 lb)   SpO2 96%  Body mass index is 34.29 kg/m².      Physical Exam:    Constitutional: Alert, no distress.  Skin: No suspicious lesions  Eye: Equal, round and reactive, conjunctiva clear, lids normal.  ENMT: Lips without lesions, good dentition, oropharynx clear.  Neck: Trachea midline, no masses, no thyromegaly. No cervical or supraclavicular lymphadenopathy.  Respiratory: Unlabored respiratory effort, lungs clear to auscultation, no wheezes, no ronchi.  Cardiovascular: Normal S1, S2, no murmur, no edema  Abdomen: Soft, non-tender, no masses, no hepatosplenomegaly.        Assessment and Plan:   The following treatment plan was discussed    All recent labs and provider notes reviewed    1. Obesity (BMI 30-39.9)    Starting " weight --266 pounds on June 7, 2023    Today's weight --239 pounds on October 6, 2023    We discussed all side effects of phentermine including but not limited to Hypertension, ischemia, palpitations, tachycardia, Dizziness, dysphoria, euphoria, headache, insomnia, overstimulation, psychosis, restlessness, Urticaria, Change in libido, Constipation, diarrhea, gastrointestinal distress, unpleasant taste, xerostomi,  Impotence   Acquired valvular heart disease (regurgitant), primary pulmonary hypertension    - phentermine 30 MG capsule; TAKE 1 CAPSULE BY MOUTH IN THE MORNING FOR 30 DAYS  Dispense: 90 Capsule; Refill: 0    2. Need for vaccination  - Influenza Vaccine Quad Injection (PF)             Instructed to Follow up in clinic or ER for worsening symptoms, difficulty breathing, lack of expected recovery, or should new symptoms or problems arise.    Followup: Return in about 3 months (around 1/6/2024) for Reevaluation.

## 2023-10-30 ENCOUNTER — HOSPITAL ENCOUNTER (EMERGENCY)
Facility: MEDICAL CENTER | Age: 28
End: 2023-10-31
Attending: STUDENT IN AN ORGANIZED HEALTH CARE EDUCATION/TRAINING PROGRAM
Payer: MEDICARE

## 2023-10-30 DIAGNOSIS — F32.A DEPRESSION, UNSPECIFIED DEPRESSION TYPE: ICD-10-CM

## 2023-10-30 DIAGNOSIS — R45.851 SUICIDAL IDEATION: ICD-10-CM

## 2023-10-30 LAB — POC BREATHALIZER: 0 PERCENT (ref 0–0.01)

## 2023-10-30 PROCEDURE — 302970 POC BREATHALIZER: Performed by: STUDENT IN AN ORGANIZED HEALTH CARE EDUCATION/TRAINING PROGRAM

## 2023-10-30 PROCEDURE — 80307 DRUG TEST PRSMV CHEM ANLYZR: CPT

## 2023-10-30 PROCEDURE — 99285 EMERGENCY DEPT VISIT HI MDM: CPT

## 2023-10-30 ASSESSMENT — FIBROSIS 4 INDEX: FIB4 SCORE: 0.33

## 2023-10-31 VITALS
DIASTOLIC BLOOD PRESSURE: 78 MMHG | TEMPERATURE: 97.8 F | HEART RATE: 78 BPM | OXYGEN SATURATION: 97 % | SYSTOLIC BLOOD PRESSURE: 132 MMHG | HEIGHT: 69 IN | RESPIRATION RATE: 16 BRPM | BODY MASS INDEX: 35.4 KG/M2 | WEIGHT: 239 LBS

## 2023-10-31 LAB
AMPHET UR QL SCN: NEGATIVE
BARBITURATES UR QL SCN: NEGATIVE
BENZODIAZ UR QL SCN: NEGATIVE
BZE UR QL SCN: NEGATIVE
CANNABINOIDS UR QL SCN: NEGATIVE
FENTANYL UR QL: NEGATIVE
METHADONE UR QL SCN: NEGATIVE
OPIATES UR QL SCN: NEGATIVE
OXYCODONE UR QL SCN: NEGATIVE
PCP UR QL SCN: NEGATIVE
PROPOXYPH UR QL SCN: NEGATIVE

## 2023-10-31 PROCEDURE — 90791 PSYCH DIAGNOSTIC EVALUATION: CPT

## 2023-10-31 RX ORDER — IBUPROFEN 200 MG
400 TABLET ORAL ONCE
COMMUNITY

## 2023-10-31 NOTE — DISCHARGE PLANNING
Alert Team:     Referral: Adult Patient Transfer to Mental Health Facility     Intervention: Received call from Nikky at [facility] stating that Dr. Montenegro has accepted the patient for admission.  Facility requests that transport be arranged for noon.     Arranged for transportation to be set up through [facility] for [facility] transport.     The pt will be picked up at noon.      Notified the RN of the departure time as well as accepting facility.      Created transfer packet and placed on chart.      Plan: Pt will transfer to [facility] at noon.       [facility] requesting parents drop off patient's medication to facility prior to transfer d/t non formulary meds. Spoke with father over the phone informing him of facility's request. Emailed address and contact information to patient's mother per father's request.

## 2023-10-31 NOTE — ED NOTES
Patient leaving with [facility]. All paperwork given to [facility]. Belongings given to [facility].

## 2023-10-31 NOTE — ED NOTES
Bedside report received from off going RN/tech: Chiquis, assumed care of patient.  POC discussed with patient. Call light within reach, all needs addressed at this time.       Fall risk interventions in place: Not Applicable (all applicable per [place] Fall risk assessment)   Continuous monitoring: Not Applicable   IVF/IV medications: Not Applicable   Oxygen: Room Air  Bedside sitter: Pt on L2k SI with 1:1 sitter Shruthi (name)  Isolation: Not Applicable

## 2023-10-31 NOTE — CONSULTS
Consult cancelled; pt transferring to [facility] at noon.

## 2023-10-31 NOTE — ED PROVIDER NOTES
"ED Provider Note    CHIEF COMPLAINT  Chief Complaint   Patient presents with    Suicidal Ideation     Patient biba from home after parents found him standing on balcony of second story condo threatening to jump. Per family, no history of suicidal ideation or attempt but patient has been reporting feeling depressed over the last 3-4 months due to feeling alone and realizing he may be alone forever due to his history of autism and epilepsy. Patient reports \"he just got back\" and changed his mind about suicidal ideation.        EXTERNAL RECORDS REVIEWED  Outpatient Notes psychiatry visit from 3/2017 where patient was evaluated for anxiety and given prescription for Celexa    HPI/ROS  LIMITATION TO HISTORY     OUTSIDE HISTORIAN(S):  Parent father    Braydon Sotelo is a 28 y.o. male who presents with suicidal ideation.  Patient's father says that patient was on his phone and then became upset and went out to balcony on the second floor.  Father went outside and found patient standing on the other side of the railing threatening to jump.  Patient refused to come off of the balcony and 911 was called.  Patient came down from the balcony prior to EMS arrival.  Father reports that patient has had agitation with family members but no history of self-harm.  Father denies any history of substance use.  Mother says patient's mood has been depressed.  Patient says that he is felt depressed and hopeless because he does not think he will ever be able to have a romantic relationship because of his autism.  Patient says he has had thoughts of jumping off a balcony for a while.  Patient denies auditory or visual hallucinations.  Patient says that he has been angry with the people who have treated him poorly.    PAST MEDICAL HISTORY   has a past medical history of Autism, Autism spectrum disorder, and Seizure (HCC).    SURGICAL HISTORY   has a past surgical history that includes tonsillectomy and adenoidectomy; other (1/10/2014); " "and tonsillectomy.    FAMILY HISTORY  Family History   Problem Relation Age of Onset    No Known Problems Mother     Sleep Apnea Father     Hypertension Father     Obesity Father     Lung Disease Paternal Aunt     Lung Disease Paternal Grandmother     No Known Problems Maternal Grandmother     Diabetes Maternal Grandfather     Hyperlipidemia Paternal Grandfather        SOCIAL HISTORY  Social History     Tobacco Use    Smoking status: Never    Smokeless tobacco: Never   Vaping Use    Vaping Use: Never used   Substance and Sexual Activity    Alcohol use: No     Alcohol/week: 0.0 oz    Drug use: No    Sexual activity: Not Currently       CURRENT MEDICATIONS  Home Medications       Reviewed by Mylene Camacho R.N. (Registered Nurse) on 10/30/23 at 2340  Med List Status: Not Addressed     Medication Last Dose Status   BRIVIACT 50 MG Tab tablet  Active   citalopram (CELEXA) 20 MG Tab  Active   clonazePAM (KLONOPIN) 0.5 MG Tab  Active   gemfibrozil (LOPID) 600 MG Tab  Active   LamoTRIgine 200 MG TABLET SR 24 HR  Active   loratadine (CLARITIN) 10 MG Tab  Active   phentermine 30 MG capsule  Active   vitamin D2, Ergocalciferol, (DRISDOL) 1.25 MG (88663 UT) Cap capsule  Active                    ALLERGIES  No Known Allergies    PHYSICAL EXAM  VITAL SIGNS: /86   Pulse 82   Temp 36 °C (96.8 °F) (Temporal)   Resp 18   Ht 1.753 m (5' 9\")   Wt 108 kg (239 lb)   SpO2 96%   BMI 35.29 kg/m²    Constitutional: Alert in no apparent distress.  HENT: No signs of trauma, Bilateral external ears normal, Nose normal.   Eyes: Pupils are equal and reactive, Conjunctiva normal, Non-icteric.   Neck: Normal range of motion, No tenderness, Supple, No stridor.   Cardiovascular: Regular rate and rhythm   Thorax & Lungs: Normal breath sounds, No respiratory distress, No wheezing, No chest tenderness.   Abdomen: Bowel sounds normal, Soft, No tenderness, No masses, No pulsatile masses.   Skin: Warm, Dry  Extremities: Intact distal " pulses, No edema, No tenderness, No cyanosis  Musculoskeletal:  No tenderness to palpation or major deformities noted.   Neurologic: Alert , Normal motor function, Normal sensory function, No focal deficits noted.   Psychiatric: Flat affect, depressed mood    DIAGNOSTIC STUDIES / PROCEDURES  EKG      LABS  Labs Reviewed   POC BREATHALIZER - Normal   URINE DRUG SCREEN         RADIOLOGY      COURSE & MEDICAL DECISION MAKING    ED Observation Status? Yes; I am placing the patient in to an observation status due to a diagnostic uncertainty as well as therapeutic intensity. Patient placed in observation status at 2:08 AM, 10/31/2023.     Observation plan is as follows: Monitor patient while awaiting psychiatric placement      INITIAL ASSESSMENT, COURSE AND PLAN  Care Narrative: Patient appears depressed with flat affect impulsive actions.  Patient having ongoing thoughts of dying and plan to jump off a balcony.  Given patient has clear plan and impulsive behavior placed on legal hold.  No exam or history to suggest underlying medical condition patient does not appear to be intoxicated or acutely psychotic.  Spoke with behavioral health regarding coordination of inpatient psychiatric care.  Signed out to oncoming ERP awaiting placement.      ADDITIONAL PROBLEM LIST    DISPOSITION AND DISCUSSIONS    Discussion of management with other QHP or appropriate source(s): Behavioral Health          FINAL DIAGNOSIS  1. Suicidal ideation    2. Depression, unspecified depression type           Electronically signed by: Darren Croft D.O., 10/31/2023 12:14 AM

## 2023-10-31 NOTE — ED NOTES
Patient sitting up in bed eating breakfast. 1:1 sitter sitting outside room in direct view of patient.

## 2023-10-31 NOTE — CONSULTS
"[facility]   TRIAGE ASSESSMENT    Name: Braydon Sotelo  MRN: [id number]  : 1995  Age: 28 y.o.  Date of assessment: 10/31/2023  PCP: Rajat Burciaga M.D.  Persons in attendance: Patient  Patient Location: [facility]    CHIEF COMPLAINT/PRESENTING ISSUE (as stated by Patient, Father, ER RN, ERP):   Chief Complaint   Patient presents with    Suicidal Ideation     Patient biba from home after parents found him standing on balcony of second story condo threatening to jump. Per family, no history of suicidal ideation or attempt but patient has been reporting feeling depressed over the last 3-4 months due to feeling alone and realizing he may be alone forever due to his history of autism and epilepsy. Patient reports \"he just got back\" and changed his mind about suicidal ideation.         CURRENT LIVING SITUATION/SOCIAL SUPPORT/FINANCIAL RESOURCES: Patient lives with parents and is employed at [employer].    BEHAVIORAL HEALTH/SUBSTANCE USE TREATMENT HISTORY  Does patient/parent report a history of prior behavioral health/substance use treatment for patient?   Patient has a diagnose history of Autism Spectrum D/O, Seizure D/O, Depression and Anxiety  Medication regime prescribed by Neurology per father    SAFETY ASSESSMENT - SELF  Does patient acknowledge current or past symptoms of dangerousness to self or is previous history noted? yes  Does parent/significant other report patient has current or past symptoms of dangerousness to self? yes  Does presenting problem suggest symptoms of dangerousness to self? Yes:     Past Current    Suicidal Thoughts: [x]  [x]    Suicidal Plans: [x]  [x]    Suicidal Intent: [x]  [x]    Suicide Attempts: []  []    Self-Injury []  []      For any boxes checked above, provide detail: Patient admits to standing on the outer edge railing of second story balcony at home attempting to jump when parents called 911; patient continues to endorse SI " "stating depression has been increasing over the past 4-5 months, triggered tonight after interaction with on-line friends stating,\"A friend don't want to be my friend no more.\" Patient vocalizing feelings of isolation, loneliness and hopelessness, \"I can't find a woman. I want to get .\" Further explaining his Autism is a barrier and \"no one is interested in a relationship with me.\"  Patient reports SI and thoughts of jumping from balcony have be present for the past several months. Reports similar feeling in 2015 when he was dating a girl in [place] but she broke up with him. Patient denies previous suicidal attempts or self-harming behaviors.     History of suicide by family member: no  History of suicide by friend/significant other: no  Recent change in frequency/specificity/intensity of suicidal thoughts or self-harm behavior? yes - 3-4 months  Current access to firearms, medications, or other identified means of suicide/self-harm? Jump from balcony  If yes, willing to restrict access to means of suicide/self-harm? yes - L2K; 1:1 sitter; belongings secure; awaiting transfer.  Protective factors present:  Strong family connections, Actively engaged in treatment, and Willing to address in treatment    SAFETY ASSESSMENT - OTHERS  Does patient acknowledge current or past symptoms of aggressive behavior or risk to others or is previous history noted? no  Does parent/significant other report patient has current or past symptoms of aggressive behavior or risk to others?  Yes; father reports intermittent hx of physical aggression when he becomes dysregulated.   Does presenting problem suggest symptoms of dangerousness to others? No; patient denies HI; calm and cooperative with ER staff.     LEGAL HISTORY  Does patient acknowledge history of arrest/intermediate/shelter or is previous history noted? no    Crisis Safety Plan completed and copy given to patient? N/A    ABUSE/NEGLECT SCREENING  Does patient report feeling " “unsafe” in his/her home, or afraid of anyone?  no  Does patient report any history of physical, sexual, or emotional abuse?  no  Does parent or significant other report any of the above? no  Is there evidence of neglect by self?  no  Is there evidence of neglect by a caregiver? no  Does the patient/parent report any history of CPS/APS/police involvement related to suspected abuse/neglect or domestic violence? no  Based on the information provided during the current assessment, is a mandated report of suspected abuse/neglect being made?  No    SUBSTANCE USE SCREENING  Pt denies any substance or alcohol use  UDS negative  ETOH 0.00      MENTAL STATUS   Participation: Active verbal participation  Grooming: Casual  Orientation: Alert and Fully Oriented  Behavior: Calm  Eye contact: Limited  Mood: Depressed  Affect: Sad  Thought process: Logical  Thought content: Within normal limits  Speech: Rate within normal limits and Volume within normal limits  Perception: Within normal limits  Memory:  No gross evidence of memory deficits  Insight: Poor  Judgment:  Poor  Other:    Collateral information:   Source:  [] Significant other present in person:   [x] Father by telephone: Russ Sotelo [phone]  [] [facility]   [x] [facility] Nursing Staff  [x] [facility] Medical Record  [x] Other: ERP    [] Unable to complete full assessment due to:  [] Acute intoxication  [] Patient declined to participate/engage  [] Patient verbally unresponsive  [] Significant cognitive deficits  [] Significant perceptual distortions or behavioral disorganization  [x] Other: N/A     CLINICAL IMPRESSIONS:  Primary:  Mood dysregulation, Depression  Secondary:  relational  discord      IDENTIFIED NEEDS/PLAN:  [Trigger DISPOSITION list for any items marked]    []  Imminent safety risk - self [] Imminent safety risk - others   []  Acute substance withdrawal []  Psychosis/Impaired reality testing   [x]  Mood/anxiety []  Substance use/Addictive behavior    [x]  Maladaptive behaviro []  Parent/child conflict   [x]  Family/Couples conflict []  Biomedical   []  Housing []  Financial   []   Legal  Occupational/Educational   []  Domestic violence []  Other:     Recommended Plan of Care:  Actively being addressed by [facility], [facility], [facility], and [facility] and 1:1 Observation; no phone, no personal items, no visitors until further evaluated by psychiatry.    Has the Recommended Plan of Care/Level of Observation been reviewed with the patient's assigned nurse? yes    Does patient/parent or guardian express agreement with the above plan? yes    Referral appointment(s) scheduled? N\A    Alert team only: L2K for SI; pt found by parents standing on the outside edge of 2 story balcony threatening to jump.   I have discussed findings and recommendations with Dr. Rodney Croft who is in agreement with these recommendations.     Referral information sent to the following outpatient community providers :      Referral information sent to the following inpatient community providers : [facility], [facility]      Libra Elliott R.N.  10/31/2023

## 2023-10-31 NOTE — ED NOTES
Patient resting on gurney with equal rise and fall of chest. 1:1 sitter sitting outside room in direct view of patient

## 2023-10-31 NOTE — ED NOTES
Bedside report received from off going Gaby RN: Assumed care of patient.  POC discussed with patient. 1:1 sitter in place, all needs addressed at this time.       Fall risk interventions in place: Not Applicable (all applicable per [place] Fall risk assessment)   Continuous monitoring: Not Applicable   IVF/IV medications: Not Applicable   Oxygen: Room Air  Bedside sitter: Pt on L2k SI with 1:1 sitter Karley (name), Report given to sitter, checklist completed, and checklist completed, stop sign in doorway  Isolation: Not Applicable

## 2023-10-31 NOTE — ED NOTES
Pt resting in bed, NADN, respirations even and unlabored, no behavorial indicators of distress or agitation noted, 1:1 sitter in direct view of pt.

## 2023-10-31 NOTE — ED NOTES
Rounded on pt. Pt resting in bed, respirations even and unlabored, no behavorial indicators of agitation or distress noted, 1:1 sitter in direct view of pt.

## 2023-10-31 NOTE — ED TRIAGE NOTES
"Chief Complaint   Patient presents with    Suicidal Ideation     Patient sharath from home after parents found him standing on balcony of second story condo threatening to jump. Per family, no history of suicidal ideation or attempt but patient has been reporting feeling depressed over the last 3-4 months due to feeling alone and realizing he may be alone forever due to his history of autism and epilepsy. Patient reports \"he just got back\" and changed his mind about suicidal ideation.      /86   Pulse 82   Temp 36 °C (96.8 °F) (Temporal)   Resp 18   Ht 1.753 m (5' 9\")   Wt 108 kg (239 lb)   SpO2 96%     Patient triggers high risk, protocol ordered.   "

## 2023-10-31 NOTE — ED NOTES
All non essential equipment removed from patient room, escorted to bathroom, changing into paper scrubs, all personal belongings removed and placed into hospital bag at nurses station, patient instructed to provide urine sample, returned to room without issues.

## 2023-10-31 NOTE — ED NOTES
Patient resting on gurney with equal rise and fall of chest. 1:1 sitter sitting outside room in direct view of patient.

## 2023-10-31 NOTE — DISCHARGE SUMMARY
"  ED Observation Discharge Summary    Patient:Braydon Sotelo  Patient : 1995  Patient MRN: [id number]  Patient PCP: Rajat Burciaga M.D.    Admit Date: 10/30/2023  Discharge Date and Time: 10/31/23 9:05 AM  Discharge Diagnosis:   1. Suicidal ideation        2. Depression, unspecified depression type            Discharge Attending: Chiquis Jacobo D.O.  Discharge Service: ED Observation    ED Course  Braydon is a 28 y.o. male who was evaluated at [facility] for suicidal ideations, depression.  He has been placed on a legal hold.  He is awaiting transfer to a psychiatric facility.      5:05 AM patient care signed out from nighttime ERP.  Patient presented here via EMS.  He was threatening to harm himself by standing on the outer ledge of the second story of the [facility] where he lives with her his parents.  He reports a history of depression.  This is worsened recently as he is feeling more isolated.  History of autism and epilepsy.  Patient's been placed on a legal hold.    Notified by alert team, patient has been accepted and is due to transfer at 12 noon.    Discharge Exam:  /83   Pulse 76   Temp 36.3 °C (97.3 °F)   Resp 16   Ht 1.753 m (5' 9\")   Wt 108 kg (239 lb)   SpO2 97%   BMI 35.29 kg/m² .    Constitutional: Awake and alert. Nontoxic  HENT:  Grossly normal  Eyes: Grossly normal  Neck: Normal range of motion  Cardiovascular: Normal heart rate   Thorax & Lungs: No respiratory distress  Abdomen: Nontender  Skin:  No pathologic rash.   Extremities: Well perfused  Psychiatric: Affect normal    Labs  Results for orders placed or performed during the hospital encounter of 10/30/23   Urine Drug Screen   Result Value Ref Range    Amphetamines Urine Negative Negative    Barbiturates Negative Negative    Benzodiazepines Negative Negative    Cocaine Metabolite Negative Negative    Fentanyl, Urine Negative Negative    Methadone Negative Negative    Opiates Negative Negative    " Oxycodone Negative Negative    Phencyclidine -Pcp Negative Negative    Propoxyphene Negative Negative    Cannabinoid Metab Negative Negative   POC BREATHALIZER   Result Value Ref Range    POC Breathalizer 0.00 0.00 - 0.01 Percent       Radiology  No orders to display       Medications:   New Prescriptions    No medications on file       My final assessment includes   1. Suicidal ideation        2. Depression, unspecified depression type          Upon Reevaluation, the patient's condition has: not improved; and will be escalated to hospitalization.    Patient discharged from ED Observation status at 12PM (Time) 10/31/2023 (Date).     Total time spent on this ED Observation discharge encounter is < 30 Minutes    Electronically signed by: Chiquis Jacobo D.O., 10/31/2023 9:05 AM

## 2023-11-07 ENCOUNTER — TELEPHONE (OUTPATIENT)
Dept: SLEEP MEDICINE | Facility: MEDICAL CENTER | Age: 28
End: 2023-11-07
Payer: MEDICARE

## 2023-11-07 DIAGNOSIS — E78.2 MIXED HYPERLIPIDEMIA: ICD-10-CM

## 2023-11-07 RX ORDER — GEMFIBROZIL 600 MG/1
600 TABLET, FILM COATED ORAL 2 TIMES DAILY
Qty: 180 TABLET | Refills: 0 | Status: SHIPPED | OUTPATIENT
Start: 2023-11-07

## 2023-11-07 NOTE — TELEPHONE ENCOUNTER
11-07-23  1st NO SHOW  Date of No Show: 11/06/23  Provider: Dr. Szymanski  Reason For Visit: 6M/FV  Outcome of call:  no answer LVM.  Left call back for scheduling [phone].

## 2023-11-10 ENCOUNTER — HOSPITAL ENCOUNTER (OUTPATIENT)
Dept: LAB | Facility: MEDICAL CENTER | Age: 28
End: 2023-11-10
Attending: FAMILY MEDICINE
Payer: MEDICARE

## 2023-11-10 DIAGNOSIS — E78.2 MIXED HYPERLIPIDEMIA: ICD-10-CM

## 2023-11-10 LAB
ALBUMIN SERPL BCP-MCNC: 4.4 G/DL (ref 3.2–4.9)
ALBUMIN/GLOB SERPL: 1.7 G/DL
ALP SERPL-CCNC: 84 U/L (ref 30–99)
ALT SERPL-CCNC: 29 U/L (ref 2–50)
ANION GAP SERPL CALC-SCNC: 10 MMOL/L (ref 7–16)
AST SERPL-CCNC: 15 U/L (ref 12–45)
BILIRUB SERPL-MCNC: 0.3 MG/DL (ref 0.1–1.5)
BUN SERPL-MCNC: 11 MG/DL (ref 8–22)
CALCIUM ALBUM COR SERPL-MCNC: 8.8 MG/DL (ref 8.5–10.5)
CALCIUM SERPL-MCNC: 9.1 MG/DL (ref 8.4–10.2)
CHLORIDE SERPL-SCNC: 106 MMOL/L (ref 96–112)
CHOLEST SERPL-MCNC: 145 MG/DL (ref 100–199)
CO2 SERPL-SCNC: 26 MMOL/L (ref 20–33)
CREAT SERPL-MCNC: 1.11 MG/DL (ref 0.5–1.4)
FASTING STATUS PATIENT QL REPORTED: NORMAL
GFR SERPLBLD CREATININE-BSD FMLA CKD-EPI: 93 ML/MIN/1.73 M 2
GLOBULIN SER CALC-MCNC: 2.6 G/DL (ref 1.9–3.5)
GLUCOSE SERPL-MCNC: 97 MG/DL (ref 65–99)
HDLC SERPL-MCNC: 34 MG/DL
LDLC SERPL CALC-MCNC: 83 MG/DL
POTASSIUM SERPL-SCNC: 4.3 MMOL/L (ref 3.6–5.5)
PROT SERPL-MCNC: 7 G/DL (ref 6–8.2)
SODIUM SERPL-SCNC: 142 MMOL/L (ref 135–145)
TRIGL SERPL-MCNC: 138 MG/DL (ref 0–149)

## 2023-11-10 PROCEDURE — 36415 COLL VENOUS BLD VENIPUNCTURE: CPT

## 2023-11-10 PROCEDURE — 80053 COMPREHEN METABOLIC PANEL: CPT

## 2023-11-10 PROCEDURE — 80061 LIPID PANEL: CPT

## 2023-11-29 ENCOUNTER — TELEPHONE (OUTPATIENT)
Dept: MEDICAL GROUP | Age: 28
End: 2023-11-29
Payer: MEDICARE
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
## 2024-01-03 DIAGNOSIS — R56.9 SEIZURE (HCC): ICD-10-CM

## 2024-01-05 ENCOUNTER — HOSPITAL ENCOUNTER (OUTPATIENT)
Dept: LAB | Facility: MEDICAL CENTER | Age: 29
End: 2024-01-05
Attending: PSYCHIATRY & NEUROLOGY
Payer: MEDICARE

## 2024-01-05 LAB
25(OH)D3 SERPL-MCNC: 47 NG/ML (ref 30–100)
AMMONIA PLAS-SCNC: 14 UMOL/L (ref 11–45)
ERYTHROCYTE [SEDIMENTATION RATE] IN BLOOD BY WESTERGREN METHOD: 2 MM/HOUR (ref 0–20)
FOLATE SERPL-MCNC: 13.7 NG/ML
T PALLIDUM AB SER QL IA: NORMAL
T3 SERPL-MCNC: 122 NG/DL (ref 60–181)
T4 SERPL-MCNC: 6.8 UG/DL (ref 4–12)
TSH SERPL DL<=0.005 MIU/L-ACNC: 1.39 UIU/ML (ref 0.38–5.33)
VIT B12 SERPL-MCNC: 392 PG/ML (ref 211–911)

## 2024-01-05 PROCEDURE — 86255 FLUORESCENT ANTIBODY SCREEN: CPT | Mod: 91

## 2024-01-05 PROCEDURE — 84207 ASSAY OF VITAMIN B-6: CPT

## 2024-01-05 PROCEDURE — 84480 ASSAY TRIIODOTHYRONINE (T3): CPT

## 2024-01-05 PROCEDURE — 83519 RIA NONANTIBODY: CPT | Mod: 91

## 2024-01-05 PROCEDURE — 84436 ASSAY OF TOTAL THYROXINE: CPT

## 2024-01-05 PROCEDURE — 86780 TREPONEMA PALLIDUM: CPT | Mod: GA

## 2024-01-05 PROCEDURE — 85652 RBC SED RATE AUTOMATED: CPT

## 2024-01-05 PROCEDURE — 82746 ASSAY OF FOLIC ACID SERUM: CPT

## 2024-01-05 PROCEDURE — 82140 ASSAY OF AMMONIA: CPT

## 2024-01-05 PROCEDURE — 84425 ASSAY OF VITAMIN B-1: CPT

## 2024-01-05 PROCEDURE — 84443 ASSAY THYROID STIM HORMONE: CPT

## 2024-01-05 PROCEDURE — 86341 ISLET CELL ANTIBODY: CPT | Mod: 91

## 2024-01-05 PROCEDURE — 83825 ASSAY OF MERCURY: CPT

## 2024-01-05 PROCEDURE — 82607 VITAMIN B-12: CPT

## 2024-01-05 PROCEDURE — 83655 ASSAY OF LEAD: CPT

## 2024-01-05 PROCEDURE — 82300 ASSAY OF CADMIUM: CPT

## 2024-01-05 PROCEDURE — 36415 COLL VENOUS BLD VENIPUNCTURE: CPT

## 2024-01-05 PROCEDURE — 82306 VITAMIN D 25 HYDROXY: CPT | Mod: GA

## 2024-01-05 PROCEDURE — 82175 ASSAY OF ARSENIC: CPT

## 2024-01-07 LAB
ARSENIC BLD-MCNC: <10 UG/L
CADMIUM BLD-MCNC: <1 UG/L
LEAD BLDV-MCNC: <2 UG/DL
MERCURY BLD-MCNC: <2.5 UG/L
VIT B6 SERPL-MCNC: 59.9 NMOL/L (ref 20–125)

## 2024-01-08 LAB
GAD65 AB SER IA-ACNC: <5 IU/ML (ref 0–5)
VIT B1 BLD-MCNC: 194 NMOL/L (ref 70–180)

## 2024-01-10 ENCOUNTER — TELEPHONE (OUTPATIENT)
Dept: HEALTH INFORMATION MANAGEMENT | Facility: OTHER | Age: 29
End: 2024-01-10
Payer: MEDICARE

## 2024-01-14 LAB — TEST NAME 95000: NORMAL

## 2024-02-02 ENCOUNTER — OFFICE VISIT (OUTPATIENT)
Dept: MEDICAL GROUP | Age: 29
End: 2024-02-02
Payer: MEDICARE

## 2024-02-02 VITALS
BODY MASS INDEX: 32.58 KG/M2 | OXYGEN SATURATION: 95 % | SYSTOLIC BLOOD PRESSURE: 102 MMHG | TEMPERATURE: 98 F | HEIGHT: 69 IN | HEART RATE: 81 BPM | WEIGHT: 220 LBS | DIASTOLIC BLOOD PRESSURE: 70 MMHG

## 2024-02-02 DIAGNOSIS — F39 MILD MOOD DISORDER (HCC): ICD-10-CM

## 2024-02-02 DIAGNOSIS — E66.9 OBESITY (BMI 30-39.9): ICD-10-CM

## 2024-02-02 DIAGNOSIS — F41.1 GENERALIZED ANXIETY DISORDER: ICD-10-CM

## 2024-02-02 DIAGNOSIS — Z00.00 ANNUAL PHYSICAL EXAM: ICD-10-CM

## 2024-02-02 DIAGNOSIS — E78.00 PURE HYPERCHOLESTEROLEMIA: ICD-10-CM

## 2024-02-02 PROCEDURE — 3074F SYST BP LT 130 MM HG: CPT | Performed by: FAMILY MEDICINE

## 2024-02-02 PROCEDURE — 99214 OFFICE O/P EST MOD 30 MIN: CPT | Performed by: FAMILY MEDICINE

## 2024-02-02 PROCEDURE — 3078F DIAST BP <80 MM HG: CPT | Performed by: FAMILY MEDICINE

## 2024-02-02 RX ORDER — UBIDECARENONE 75 MG
100 CAPSULE ORAL DAILY
COMMUNITY

## 2024-02-02 ASSESSMENT — FIBROSIS 4 INDEX: FIB4 SCORE: 0.32
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
## 2024-04-08 ENCOUNTER — PATIENT MESSAGE (OUTPATIENT)
Dept: MEDICAL GROUP | Age: 29
End: 2024-04-08
Payer: MEDICARE

## 2024-04-09 ENCOUNTER — TELEPHONE (OUTPATIENT)
Dept: MEDICAL GROUP | Age: 29
End: 2024-04-09
Payer: MEDICARE

## 2024-04-09 NOTE — TELEPHONE ENCOUNTER
Patient message my chart please advise :    Braydon has received a summons to report for jury service [place]. Prior to submitting summons via mail, it is required that a written statement from Dr. Burciaga justifying why I should be permanently excused from Jury Duty. Braydon has a diagnosis of Autism Spectrum Disorder and Epilepsy. The summons needs to be completed and mailed within 10 days. Please call Russ Sotelo at [phone] when letter can be picked up at office of Dr. Burciaga. Thank you for your attention in this matter.

## 2024-04-19 ENCOUNTER — APPOINTMENT (OUTPATIENT)
Dept: NEUROLOGY | Facility: MEDICAL CENTER | Age: 29
End: 2024-04-19
Attending: STUDENT IN AN ORGANIZED HEALTH CARE EDUCATION/TRAINING PROGRAM
Payer: MEDICARE

## 2024-04-25 ENCOUNTER — OFFICE VISIT (OUTPATIENT)
Dept: MEDICAL GROUP | Age: 29
End: 2024-04-25
Payer: MEDICARE

## 2024-04-25 VITALS
HEIGHT: 70 IN | HEART RATE: 75 BPM | BODY MASS INDEX: 32.93 KG/M2 | SYSTOLIC BLOOD PRESSURE: 119 MMHG | DIASTOLIC BLOOD PRESSURE: 60 MMHG | WEIGHT: 230 LBS | OXYGEN SATURATION: 95 % | TEMPERATURE: 98.8 F

## 2024-04-25 DIAGNOSIS — J02.9 ACUTE PHARYNGITIS, UNSPECIFIED ETIOLOGY: ICD-10-CM

## 2024-04-25 PROCEDURE — 3074F SYST BP LT 130 MM HG: CPT | Performed by: STUDENT IN AN ORGANIZED HEALTH CARE EDUCATION/TRAINING PROGRAM

## 2024-04-25 PROCEDURE — 99213 OFFICE O/P EST LOW 20 MIN: CPT | Performed by: STUDENT IN AN ORGANIZED HEALTH CARE EDUCATION/TRAINING PROGRAM

## 2024-04-25 PROCEDURE — 3078F DIAST BP <80 MM HG: CPT | Performed by: STUDENT IN AN ORGANIZED HEALTH CARE EDUCATION/TRAINING PROGRAM

## 2024-04-25 ASSESSMENT — FIBROSIS 4 INDEX: FIB4 SCORE: 0.32

## 2024-04-25 NOTE — PROGRESS NOTES
"Chief Complaint   Patient presents with    Pharyngitis     Patient states ongoing sore throat 2 days causing difficulty to talk, with flems coming up. Needs doctors note for work.        HPI: This is a 28 y.o. patient has 2 days of sore throat, sporadic cough and mild congestion.  Denies runny nose or ear fullness. No abnormal shortness of breath. No nausea vomiting or diarrhea.  He also denies subjective fever, chills or night sweats.  No sick exposures. No coughing up blood. No headache.  Patient over-the-counter nasal spray, because he thought he had allergies.     ROS:  No fever, chills, night sweats, shortness of breath,, nausea, changes in bowel movements or skin rash.      I reviewed the patient's medications, allergies and medical history:  Current Outpatient Medications   Medication Sig Dispense Refill    cyanocobalamin (VITAMIN B-12) 100 MCG Tab Take 100 mcg by mouth every day.      gemfibrozil (LOPID) 600 MG Tab Take 1 Tablet by mouth 2 times a day. 180 Tablet 0    ibuprofen (MOTRIN) 200 MG Tab Take 400 mg by mouth one time.      BRIVIACT 50 MG Tab tablet Take 50 mg by mouth 2 times a day.      citalopram (CELEXA) 20 MG Tab Take 20 mg by mouth at bedtime.      clonazePAM (KLONOPIN) 1 MG Tab Take 1 mg by mouth every evening.      LamoTRIgine 200 MG TABLET SR 24 HR Take 1 Tablet by mouth every morning.      vitamin D2, Ergocalciferol, (DRISDOL) 1.25 MG (20331 UT) Cap capsule Take 1 capsule by mouth once a week 5 Capsule 0     No current facility-administered medications for this visit.     Phentermine  Past Medical History:   Diagnosis Date    Autism     Autism spectrum disorder     Seizure (HCC)     since 2013        EXAM:  /60 (BP Location: Right arm, Patient Position: Sitting, BP Cuff Size: Large adult)   Pulse 75   Temp 37.1 °C (98.8 °F) (Temporal)   Ht 1.778 m (5' 10\")   Wt 104 kg (230 lb)   SpO2 95%   General: NAD, non-toxic appearance.  Eyes: PERRL, conjunctiva slightly injected, no " photophobia or eye discharge.  Ears: Normal pinnae. TM's pearly gray with good landmarks bilaterally.  Nares: Patent without mucus.  Sinuses: Non-tender over maxillary and frontal sinuses.  Throat: Mild erythematous injection without enlarged tonsils.  No exudates.   Neck: Supple, without anterior cervical lymphadenopathy.  Lungs: Good air entry bilaterally, clear to auscultation. No wheeze, rhonchi or crackles. Normal respiratory effort.  Heart: Regular rate without murmur.  Abdomen: Soft and non-tender. No hepatosplenomegaly.  Skin: Warm and dry. No rash.     Results for orders placed or performed during the hospital encounter of 01/05/24   FOLATE SERUM/PLASMA   Result Value Ref Range    Folate -Folic Acid 13.7 >4.0 ng/mL   T.PALLIDUM AB CARRIE (SCREENING)   Result Value Ref Range    Syphilis, Treponemal Qual Non-Reactive Non-Reactive   CHUCK-65   Result Value Ref Range    CHUCK Antibody <5.0 0.0 - 5.0 IU/mL   Sed Rate   Result Value Ref Range    Sed Rate Westergren 2 0 - 20 mm/hour   HEAVY METALS BLOOD   Result Value Ref Range    Arsenic, Blood <10.0 <=12.0 ug/L    Mercury, Blood <2.5 <=10.0 ug/L    Lead Blood <2.0 <=4.9 ug/dL    Cadmium Blood <1.0 <=5.0 ug/L   TSH   Result Value Ref Range    TSH 1.390 0.380 - 5.330 uIU/mL   TRIIDOTHYRONINE   Result Value Ref Range    T3 122.0 60.0 - 181.0 ng/dL   THYROXINE (TOTAL)   Result Value Ref Range    Thyroxine -T4 6.8 4.0 - 12.0 ug/dL   VITAMIN B1   Result Value Ref Range    Vitamin B1 194 (H) 70 - 180 nmol/L   VITAMIN B12   Result Value Ref Range    Vitamin B12 -True Cobalamin 392 211 - 911 pg/mL   VITAMIN B6   Result Value Ref Range    Vitamin B6 59.9 20.0 - 125.0 nmol/L   VITAMIN D,25 HYDROXY (DEFICIENCY)   Result Value Ref Range    25-Hydroxy   Vitamin D 25 47 30 - 100 ng/mL   AMMONIA   Result Value Ref Range    Ammonia 14 11 - 45 umol/L   Encephalopathy Autoimmune Eval (Serum)   Result Value Ref Range    Miscellaneous Lab Result SEE NOTE          ASSESSMENT:     1. Acute  pharyngitis, unspecified etiology     -See HPI  -2 days of mild sore throat and sporadic cough  -Denies fever, chills, shortness of breath, GI symptoms.  -No sick contacts  -Patient stated today his symptoms have improved mildly  -Physical examination was very unremarkable     PLAN:  1. Discussed benign nature of viral upper respiratory infections.  2. OTC anti-pyretics and decongestants as needed. Supportive care advised.  3. Follow-up in office or urgent care for worsening symptoms, difficulty breathing, lack of expected recovery, or should new symptoms or problems arise.

## 2024-04-25 NOTE — LETTER
April 25, 2024    To Whom It May Concern:         This is confirmation that Braydon Sotelo attended his scheduled appointment with Bipin Rausch M.D. on 4/25/24.    Please excuse from work Mr. Braydon Sotelo from 4/23/24 - 4/25/24. He was not able to go to work due to illness.          If you have any questions please do not hesitate to call me at the phone number listed below.    Sincerely,          Bipin Rausch M.D.  [phone]

## 2024-05-13 ENCOUNTER — APPOINTMENT (OUTPATIENT)
Dept: NEUROLOGY | Facility: MEDICAL CENTER | Age: 29
End: 2024-05-13
Attending: STUDENT IN AN ORGANIZED HEALTH CARE EDUCATION/TRAINING PROGRAM
Payer: MEDICARE

## 2024-05-13 NOTE — PROGRESS NOTES
[facility]  Transfer/re-establish care    Patient name: Braydon Sotelo  YOB: 1995  MRN: [id number]  Referring provider: Rajat Burciaga M.D.  [address] [place] [address] [place] [address]   Date of visit: 5/13/2024     CC: Seizure      HPI:    Braydon Sotelo is a 28 y.o. man with a history of epilepsy, autism spectrum disorder who is referred for an initial neurologic consultation for seizures.     Onset: teenage years  Semiology: GTCs only *** (+) tongue biting, (+) bladder incontinence. Associated with post-ictal state. Vomiting  Frequency: ***  Duration of spells: 2-3 minutes  Triggers: ***  Current treatment: Lamictal  mg AM, Keppra ER 1500 mg qhs, Klonopin 1 mg qhs   Previous treatments: ***  Status Epilepticus: ***    Seizure types and semiology:   Type I:  Seizure type:   Warning/Aura:   Description:    Loss of awareness:   Convulsive:   Post-ictal symptoms:   Frequency:    Duration:  Triggers:   Clusters of seizures:     Type 2:  Seizure type:   Warning/Aura:   Description:    Loss of awareness:   Convulsive:   Post-ictal symptoms:   Frequency:    Duration:  Triggers:   Clusters of seizures:        Last seizure: ***    Mood: ***    Side effects: ***    Barriers to taking medication appropriately: ***    Driving: ***    Seizure safety: ***    Vitamin D: ***    Women's issues in epilepsy: ***    Risk factors for epileptic seizures:  Normal birth history, no complications, born at term.   No history of significant head trauma.   No history of stroke or meningitis.  No family history of epilepsy.   No febrile seizures.       Risk factors for psychogenic seizures:  No previous psychiatric hospitalizations.   No history of preexisting psychiatric diagnoses.   No history of physical, sexual, or emotional abuse.   No history of fibromyalgia or disorder of chronic pain.   No history of seizures in a close friend or relative.   No history of seizure duration > 5 minutes.        ROS:   Constitutional: No fevers or chills.  Eyes: No blurry vision or eye pain.  ENT: No dysphagia or hearing loss.  Respiratory: No cough or shortness of breath.  Cardiovascular: No chest pain or palpitations.  GI: No nausea, vomiting, or diarrhea.  : No urinary incontinence or dysuria.  Musculoskeletal: No joint swelling or arthralgias.  Skin: No skin rashes.  Neuro: No headaches, dizziness, or tremors.  Endocrine: No heat or cold intolerance. No polydipsia or polyuria.  Psych: No depression or anxiety.  Heme/Lymph: No easy bruising or swollen lymph nodes.  All other review of systems were reviewed and were negative.     Past Medical History:   Past Medical History:   Diagnosis Date    Autism     Autism spectrum disorder     Seizure (HCC)     since 2013       Past Surgical History:   Past Surgical History:   Procedure Laterality Date    OTHER  1/10/2014    wisdom teeth extraction    TONSILLECTOMY      TONSILLECTOMY AND ADENOIDECTOMY         Social History:   Social History     Socioeconomic History    Marital status: Single     Spouse name: Not on file    Number of children: Not on file    Years of education: Not on file    Highest education level: Not on file   Occupational History    Not on file   Tobacco Use    Smoking status: Never    Smokeless tobacco: Never   Vaping Use    Vaping Use: Never used   Substance and Sexual Activity    Alcohol use: No     Alcohol/week: 0.0 oz    Drug use: No    Sexual activity: Not Currently   Other Topics Concern    Not on file   Social History Narrative    Not on file     Social Determinants of Health     Financial Resource Strain: Not on file   Food Insecurity: Not on file   Transportation Needs: Not on file   Physical Activity: Not on file   Stress: Not on file   Social Connections: Not on file   Intimate Partner Violence: Not on file   Housing Stability: Not on file       Family Hx:   Family History   Problem Relation Age of Onset    No Known Problems Mother      Sleep Apnea Father     Hypertension Father     Obesity Father     Lung Disease Paternal Aunt     Lung Disease Paternal Grandmother     No Known Problems Maternal Grandmother     Diabetes Maternal Grandfather     Hyperlipidemia Paternal Grandfather        Current Medications:   Current Outpatient Medications:     cyanocobalamin (VITAMIN B-12) 100 MCG Tab, Take 100 mcg by mouth every day., Disp: , Rfl:     gemfibrozil (LOPID) 600 MG Tab, Take 1 Tablet by mouth 2 times a day., Disp: 180 Tablet, Rfl: 0    ibuprofen (MOTRIN) 200 MG Tab, Take 400 mg by mouth one time., Disp: , Rfl:     BRIVIACT 50 MG Tab tablet, Take 50 mg by mouth 2 times a day., Disp: , Rfl:     citalopram (CELEXA) 20 MG Tab, Take 20 mg by mouth at bedtime., Disp: , Rfl:     clonazePAM (KLONOPIN) 1 MG Tab, Take 1 mg by mouth every evening., Disp: , Rfl:     LamoTRIgine 200 MG TABLET SR 24 HR, Take 1 Tablet by mouth every morning., Disp: , Rfl:     vitamin D2, Ergocalciferol, (DRISDOL) 1.25 MG (17133 UT) Cap capsule, Take 1 capsule by mouth once a week, Disp: 5 Capsule, Rfl: 0    Allergies:   Allergies   Allergen Reactions    Phentermine Unspecified     His Neurologist stated that phentermine made him Manic         Physical Exam:   Ambulatory Vitals  There were no vitals filed for this visit.    Constitutional: Well-developed, well-nourished, good hygiene. Appears stated age.  Cardiovascular: RRR, with no murmurs, rubs or gallops. No carotid bruits. No peripheral edema, pedal pulses intact.   Respiratory: Lungs CTA B/L, no W/R/R.   Skin: Warm, dry, intact. No rashes observed.  Eyes:    Funduscopic: Optic discs flat with no evidence of papilledema or pallor. Normal posterior segments.  Neurologic:   Mental Status: Awake, alert, oriented x 3.   Speech: Fluent with normal prosody.   Memory: Able to recall 3 words at 1 minute and 5 minutes. Able to recall recent and remote events accurately.    Concentration: Attentive. Able to focus on history and follow  multi-step commands.   Fund of Knowledge: Appropriate.   Cranial Nerves:    CN II: PERRL     CN III, IV, VI: EOMI without nystagmus    CN V: Facial sensation intact and symmetric in all 3 trigeminal distributions    CN VII: No facial asymmetry    CN VIII: Hearing intact to finger rub     CN IX and X: Palate elevates symmetrically, gag reflex not tested    CN XI: Symmetric shoulder shrug     CN XII: Tongue midline   Motor: 5/5 in upper and lower extremities bilaterally   Sensory: Intact light touch, vibration and temperature diffusely    Coordination: No evidence of past-pointing on finger to nose testing, no dysdiadochokinesia. Heel to shin intact.    DTR's: 2+ throughout without clonus.    Babinski: Toes downgoing bilaterally.   Gait: ambulates steadily without assistive device. Romberg negative. Able to perform tandem gait.    Movements: No resting tremors or abnormal movements observed.   Musculoskeletal:    Strength: as above   Tone: Normal bulk and tone   Joints: No swelling    Studies:      Labs reviewed:      Imaging:     MRI 3T Epilepsy protocol 1/7/2024 @ [facility]  Nonlesional     EEG Results:   Ambulatory EEG 5/2019  Normal    Routine EEG 2018  Normal    Assessment/Plan:     There are no diagnoses linked to this encounter.      - vitamin D supplementation recommended.   - driving was discussed          There are no Patient Instructions on file for this visit.      Betty Agosto M.D.   Diplomate, Neurology with Special Qualification in Epilepsy, American Board of Psychiatry and Neurology   of Clinical Neurology, [facility]  Level III Epilepsy Center, Department of Neurology at [facility]   5/13/2024      During today's encounter we discussed available treatment options and their individual side effect profiles. Total encounter time caring for patient today *** minutes.

## 2024-05-20 DIAGNOSIS — G40.001 PARTIAL IDIOPATHIC EPILEPSY WITH SEIZURES OF LOCALIZED ONSET, NOT INTRACTABLE, WITH STATUS EPILEPTICUS (HCC): ICD-10-CM

## 2024-05-20 NOTE — TELEPHONE ENCOUNTER
Patients dad called in and states that his son needs his seizure medication sent in until they see the neurologist in July 17th please and thank you

## 2024-05-22 RX ORDER — LAMOTRIGINE 200 MG/1
1 TABLET, EXTENDED RELEASE ORAL EVERY MORNING
Qty: 90 TABLET | Refills: 2 | Status: SHIPPED | OUTPATIENT
Start: 2024-05-22

## 2024-05-22 RX ORDER — BRIVARACETAM 50 MG/1
50 TABLET, FILM COATED ORAL 2 TIMES DAILY
Qty: 180 TABLET | Refills: 0 | Status: SHIPPED | OUTPATIENT
Start: 2024-05-22 | End: 2024-08-20

## 2024-05-25 ENCOUNTER — APPOINTMENT (OUTPATIENT)
Dept: RADIOLOGY | Facility: MEDICAL CENTER | Age: 29
DRG: 100 | End: 2024-05-25
Attending: STUDENT IN AN ORGANIZED HEALTH CARE EDUCATION/TRAINING PROGRAM
Payer: MEDICARE

## 2024-05-25 ENCOUNTER — HOSPITAL ENCOUNTER (INPATIENT)
Facility: MEDICAL CENTER | Age: 29
LOS: 1 days | DRG: 100 | End: 2024-05-25
Attending: STUDENT IN AN ORGANIZED HEALTH CARE EDUCATION/TRAINING PROGRAM | Admitting: HOSPITALIST
Payer: MEDICARE

## 2024-05-25 VITALS
WEIGHT: 234.57 LBS | TEMPERATURE: 97.3 F | HEIGHT: 72 IN | HEART RATE: 81 BPM | RESPIRATION RATE: 17 BRPM | OXYGEN SATURATION: 94 % | DIASTOLIC BLOOD PRESSURE: 64 MMHG | BODY MASS INDEX: 31.77 KG/M2 | SYSTOLIC BLOOD PRESSURE: 109 MMHG

## 2024-05-25 DIAGNOSIS — T17.908A ASPIRATION INTO AIRWAY, INITIAL ENCOUNTER: ICD-10-CM

## 2024-05-25 DIAGNOSIS — J96.01 ACUTE HYPOXIC RESPIRATORY FAILURE (HCC): ICD-10-CM

## 2024-05-25 DIAGNOSIS — R56.9 SEIZURE (HCC): ICD-10-CM

## 2024-05-25 PROBLEM — G47.30 SLEEP APNEA: Status: ACTIVE | Noted: 2024-05-25

## 2024-05-25 PROBLEM — J96.02 ACUTE RESPIRATORY FAILURE WITH HYPOXIA AND HYPERCAPNIA (HCC): Status: ACTIVE | Noted: 2024-05-25

## 2024-05-25 LAB
ALBUMIN SERPL BCP-MCNC: 4.3 G/DL (ref 3.2–4.9)
ALBUMIN/GLOB SERPL: 1.8 G/DL
ALP SERPL-CCNC: 82 U/L (ref 30–99)
ALT SERPL-CCNC: 33 U/L (ref 2–50)
ANION GAP SERPL CALC-SCNC: 18 MMOL/L (ref 7–16)
APPEARANCE UR: CLEAR
AST SERPL-CCNC: 29 U/L (ref 12–45)
BASOPHILS # BLD AUTO: 0.4 % (ref 0–1.8)
BASOPHILS # BLD: 0.03 K/UL (ref 0–0.12)
BILIRUB SERPL-MCNC: 0.2 MG/DL (ref 0.1–1.5)
BILIRUB UR QL STRIP.AUTO: NEGATIVE
BUN SERPL-MCNC: 21 MG/DL (ref 8–22)
CALCIUM ALBUM COR SERPL-MCNC: 8.6 MG/DL (ref 8.5–10.5)
CALCIUM SERPL-MCNC: 8.8 MG/DL (ref 8.5–10.5)
CHLORIDE SERPL-SCNC: 102 MMOL/L (ref 96–112)
CO2 SERPL-SCNC: 20 MMOL/L (ref 20–33)
COLOR UR: YELLOW
CREAT SERPL-MCNC: 1.21 MG/DL (ref 0.5–1.4)
D DIMER PPP IA.FEU-MCNC: <0.27 UG/ML (FEU) (ref 0–0.5)
EKG IMPRESSION: NORMAL
EOSINOPHIL # BLD AUTO: 0.24 K/UL (ref 0–0.51)
EOSINOPHIL NFR BLD: 3.1 % (ref 0–6.9)
ERYTHROCYTE [DISTWIDTH] IN BLOOD BY AUTOMATED COUNT: 39.2 FL (ref 35.9–50)
FLUAV RNA SPEC QL NAA+PROBE: NEGATIVE
FLUBV RNA SPEC QL NAA+PROBE: NEGATIVE
GFR SERPLBLD CREATININE-BSD FMLA CKD-EPI: 84 ML/MIN/1.73 M 2
GLOBULIN SER CALC-MCNC: 2.4 G/DL (ref 1.9–3.5)
GLUCOSE SERPL-MCNC: 84 MG/DL (ref 65–99)
GLUCOSE UR STRIP.AUTO-MCNC: NEGATIVE MG/DL
HCT VFR BLD AUTO: 49.3 % (ref 42–52)
HGB BLD-MCNC: 16.6 G/DL (ref 14–18)
IMM GRANULOCYTES # BLD AUTO: 0.07 K/UL (ref 0–0.11)
IMM GRANULOCYTES NFR BLD AUTO: 0.9 % (ref 0–0.9)
KETONES UR STRIP.AUTO-MCNC: NEGATIVE MG/DL
LEUKOCYTE ESTERASE UR QL STRIP.AUTO: NEGATIVE
LYMPHOCYTES # BLD AUTO: 2.45 K/UL (ref 1–4.8)
LYMPHOCYTES NFR BLD: 31.3 % (ref 22–41)
MCH RBC QN AUTO: 29.7 PG (ref 27–33)
MCHC RBC AUTO-ENTMCNC: 33.7 G/DL (ref 32.3–36.5)
MCV RBC AUTO: 88.4 FL (ref 81.4–97.8)
MICRO URNS: NORMAL
MONOCYTES # BLD AUTO: 0.57 K/UL (ref 0–0.85)
MONOCYTES NFR BLD AUTO: 7.3 % (ref 0–13.4)
NEUTROPHILS # BLD AUTO: 4.48 K/UL (ref 1.82–7.42)
NEUTROPHILS NFR BLD: 57 % (ref 44–72)
NITRITE UR QL STRIP.AUTO: NEGATIVE
NRBC # BLD AUTO: 0 K/UL
NRBC BLD-RTO: 0 /100 WBC (ref 0–0.2)
NT-PROBNP SERPL IA-MCNC: <36 PG/ML (ref 0–125)
PH UR STRIP.AUTO: 6.5 [PH] (ref 5–8)
PLATELET # BLD AUTO: 221 K/UL (ref 164–446)
PMV BLD AUTO: 10.1 FL (ref 9–12.9)
POTASSIUM SERPL-SCNC: 4.1 MMOL/L (ref 3.6–5.5)
PROCALCITONIN SERPL-MCNC: <0.05 NG/ML
PROT SERPL-MCNC: 6.7 G/DL (ref 6–8.2)
PROT UR QL STRIP: NEGATIVE MG/DL
RBC # BLD AUTO: 5.58 M/UL (ref 4.7–6.1)
RBC UR QL AUTO: NEGATIVE
RSV RNA SPEC QL NAA+PROBE: NEGATIVE
SARS-COV-2 RNA RESP QL NAA+PROBE: NOTDETECTED
SODIUM SERPL-SCNC: 140 MMOL/L (ref 135–145)
SP GR UR STRIP.AUTO: 1.01
UROBILINOGEN UR STRIP.AUTO-MCNC: 0.2 MG/DL
WBC # BLD AUTO: 7.8 K/UL (ref 4.8–10.8)

## 2024-05-25 PROCEDURE — 83880 ASSAY OF NATRIURETIC PEPTIDE: CPT

## 2024-05-25 PROCEDURE — 99235 HOSP IP/OBS SAME DATE MOD 70: CPT | Performed by: HOSPITALIST

## 2024-05-25 PROCEDURE — 93005 ELECTROCARDIOGRAM TRACING: CPT

## 2024-05-25 PROCEDURE — 4A10X4Z MONITORING OF CENTRAL NERVOUS ELECTRICAL ACTIVITY, EXTERNAL APPROACH: ICD-10-PCS | Performed by: STUDENT IN AN ORGANIZED HEALTH CARE EDUCATION/TRAINING PROGRAM

## 2024-05-25 PROCEDURE — 770006 HCHG ROOM/CARE - MED/SURG/GYN SEMI*

## 2024-05-25 PROCEDURE — 94640 AIRWAY INHALATION TREATMENT: CPT

## 2024-05-25 PROCEDURE — 80053 COMPREHEN METABOLIC PANEL: CPT

## 2024-05-25 PROCEDURE — 81003 URINALYSIS AUTO W/O SCOPE: CPT

## 2024-05-25 PROCEDURE — 95819 EEG AWAKE AND ASLEEP: CPT | Performed by: STUDENT IN AN ORGANIZED HEALTH CARE EDUCATION/TRAINING PROGRAM

## 2024-05-25 PROCEDURE — 71045 X-RAY EXAM CHEST 1 VIEW: CPT

## 2024-05-25 PROCEDURE — 99285 EMERGENCY DEPT VISIT HI MDM: CPT

## 2024-05-25 PROCEDURE — 700102 HCHG RX REV CODE 250 W/ 637 OVERRIDE(OP): Performed by: HOSPITALIST

## 2024-05-25 PROCEDURE — A9270 NON-COVERED ITEM OR SERVICE: HCPCS | Performed by: HOSPITALIST

## 2024-05-25 PROCEDURE — 93005 ELECTROCARDIOGRAM TRACING: CPT | Performed by: STUDENT IN AN ORGANIZED HEALTH CARE EDUCATION/TRAINING PROGRAM

## 2024-05-25 PROCEDURE — 700105 HCHG RX REV CODE 258: Mod: UD | Performed by: STUDENT IN AN ORGANIZED HEALTH CARE EDUCATION/TRAINING PROGRAM

## 2024-05-25 PROCEDURE — 36415 COLL VENOUS BLD VENIPUNCTURE: CPT

## 2024-05-25 PROCEDURE — 84145 PROCALCITONIN (PCT): CPT

## 2024-05-25 PROCEDURE — 0241U HCHG SARS-COV-2 COVID-19 NFCT DS RESP RNA 4 TRGT ED POC: CPT

## 2024-05-25 PROCEDURE — 700101 HCHG RX REV CODE 250: Performed by: HOSPITALIST

## 2024-05-25 PROCEDURE — 85025 COMPLETE CBC W/AUTO DIFF WBC: CPT

## 2024-05-25 PROCEDURE — 99222 1ST HOSP IP/OBS MODERATE 55: CPT | Performed by: PSYCHIATRY & NEUROLOGY

## 2024-05-25 PROCEDURE — 85379 FIBRIN DEGRADATION QUANT: CPT

## 2024-05-25 RX ORDER — PROMETHAZINE HYDROCHLORIDE 25 MG/1
12.5-25 TABLET ORAL EVERY 4 HOURS PRN
Status: DISCONTINUED | OUTPATIENT
Start: 2024-05-25 | End: 2024-05-25 | Stop reason: HOSPADM

## 2024-05-25 RX ORDER — CLONAZEPAM 1 MG/1
1 TABLET ORAL NIGHTLY
Status: DISCONTINUED | OUTPATIENT
Start: 2024-05-25 | End: 2024-05-25 | Stop reason: HOSPADM

## 2024-05-25 RX ORDER — LAMOTRIGINE 200 MG/1
200 TABLET, EXTENDED RELEASE ORAL DAILY
COMMUNITY
End: 2024-05-31

## 2024-05-25 RX ORDER — PROCHLORPERAZINE EDISYLATE 5 MG/ML
5-10 INJECTION INTRAMUSCULAR; INTRAVENOUS EVERY 4 HOURS PRN
Status: DISCONTINUED | OUTPATIENT
Start: 2024-05-25 | End: 2024-05-25 | Stop reason: HOSPADM

## 2024-05-25 RX ORDER — CITALOPRAM 20 MG/1
20 TABLET ORAL EVERY EVENING
COMMUNITY
End: 2024-05-31

## 2024-05-25 RX ORDER — LABETALOL HYDROCHLORIDE 5 MG/ML
10 INJECTION, SOLUTION INTRAVENOUS EVERY 4 HOURS PRN
Status: DISCONTINUED | OUTPATIENT
Start: 2024-05-25 | End: 2024-05-25 | Stop reason: HOSPADM

## 2024-05-25 RX ORDER — CITALOPRAM 20 MG/1
20 TABLET ORAL EVERY EVENING
Status: DISCONTINUED | OUTPATIENT
Start: 2024-05-25 | End: 2024-05-25 | Stop reason: HOSPADM

## 2024-05-25 RX ORDER — LAMOTRIGINE 100 MG/1
100 TABLET ORAL 2 TIMES DAILY
Status: DISCONTINUED | OUTPATIENT
Start: 2024-05-25 | End: 2024-05-25 | Stop reason: HOSPADM

## 2024-05-25 RX ORDER — ONDANSETRON 4 MG/1
4 TABLET, ORALLY DISINTEGRATING ORAL EVERY 4 HOURS PRN
Status: DISCONTINUED | OUTPATIENT
Start: 2024-05-25 | End: 2024-05-25 | Stop reason: HOSPADM

## 2024-05-25 RX ORDER — IPRATROPIUM BROMIDE AND ALBUTEROL SULFATE 2.5; .5 MG/3ML; MG/3ML
3 SOLUTION RESPIRATORY (INHALATION) ONCE
Status: COMPLETED | OUTPATIENT
Start: 2024-05-25 | End: 2024-05-25

## 2024-05-25 RX ORDER — SODIUM CHLORIDE, SODIUM LACTATE, POTASSIUM CHLORIDE, CALCIUM CHLORIDE 600; 310; 30; 20 MG/100ML; MG/100ML; MG/100ML; MG/100ML
1000 INJECTION, SOLUTION INTRAVENOUS ONCE
Status: COMPLETED | OUTPATIENT
Start: 2024-05-25 | End: 2024-05-25

## 2024-05-25 RX ORDER — CLONAZEPAM 1 MG/1
1 TABLET ORAL NIGHTLY
COMMUNITY

## 2024-05-25 RX ORDER — PROMETHAZINE HYDROCHLORIDE 25 MG/1
12.5-25 SUPPOSITORY RECTAL EVERY 4 HOURS PRN
Status: DISCONTINUED | OUTPATIENT
Start: 2024-05-25 | End: 2024-05-25 | Stop reason: HOSPADM

## 2024-05-25 RX ORDER — ACETAMINOPHEN 325 MG/1
650 TABLET ORAL EVERY 6 HOURS PRN
Status: DISCONTINUED | OUTPATIENT
Start: 2024-05-25 | End: 2024-05-25 | Stop reason: HOSPADM

## 2024-05-25 RX ORDER — ONDANSETRON 2 MG/ML
4 INJECTION INTRAMUSCULAR; INTRAVENOUS EVERY 4 HOURS PRN
Status: DISCONTINUED | OUTPATIENT
Start: 2024-05-25 | End: 2024-05-25 | Stop reason: HOSPADM

## 2024-05-25 RX ORDER — ERGOCALCIFEROL 1.25 MG/1
50000 CAPSULE ORAL
COMMUNITY
End: 2024-05-31

## 2024-05-25 RX ADMIN — LAMOTRIGINE 100 MG: 100 TABLET ORAL at 06:38

## 2024-05-25 RX ADMIN — IPRATROPIUM BROMIDE AND ALBUTEROL SULFATE 3 ML: 2.5; .5 SOLUTION RESPIRATORY (INHALATION) at 07:07

## 2024-05-25 RX ADMIN — SODIUM CHLORIDE, POTASSIUM CHLORIDE, SODIUM LACTATE AND CALCIUM CHLORIDE 1000 ML: 600; 310; 30; 20 INJECTION, SOLUTION INTRAVENOUS at 04:59

## 2024-05-25 RX ADMIN — BRIVARACETAM 50 MG: 50 TABLET, FILM COATED ORAL at 06:38

## 2024-05-25 ASSESSMENT — ENCOUNTER SYMPTOMS
STRIDOR: 0
WEAKNESS: 0
PHOTOPHOBIA: 0
FALLS: 0
SPUTUM PRODUCTION: 0
VOMITING: 0
NECK PAIN: 0
HEADACHES: 0
EYE PAIN: 0
ORTHOPNEA: 0
CONSTIPATION: 0
FLANK PAIN: 0
PALPITATIONS: 0
MYALGIAS: 0
COUGH: 0
DIARRHEA: 0
FEVER: 0
TREMORS: 0
DOUBLE VISION: 0
PND: 0
DEPRESSION: 0
SINUS PAIN: 0
LOSS OF CONSCIOUSNESS: 1
DIZZINESS: 0
SORE THROAT: 0
ABDOMINAL PAIN: 0
BLURRED VISION: 0
SEIZURES: 1
WHEEZING: 0
HEMOPTYSIS: 0
DIAPHORESIS: 0
CLAUDICATION: 0
BACK PAIN: 0
POLYDIPSIA: 0
CHILLS: 0
NAUSEA: 0
SHORTNESS OF BREATH: 0
HEARTBURN: 0
TINGLING: 0
HALLUCINATIONS: 0
BLOOD IN STOOL: 0
BRUISES/BLEEDS EASILY: 0

## 2024-05-25 ASSESSMENT — LIFESTYLE VARIABLES: SUBSTANCE_ABUSE: 0

## 2024-05-25 ASSESSMENT — PAIN DESCRIPTION - PAIN TYPE: TYPE: OTHER (COMMENT)

## 2024-05-25 ASSESSMENT — FIBROSIS 4 INDEX: FIB4 SCORE: 0.62

## 2024-05-25 NOTE — ASSESSMENT & PLAN NOTE
It is possible that patient has sleep apnea and from the severe hypoxia or hypercapnia that it incited a seizure event  Seizure precautions  Patient does not have any focal deficits and I will not order any brain imaging  Monitor electrolytes  Continue home seizure medications  Consider neurology consult

## 2024-05-25 NOTE — DISCHARGE SUMMARY
Discharge Summary    CHIEF COMPLAINT ON ADMISSION  Chief Complaint   Patient presents with    Seizure     Witnessed seizure by parents. Unknown duration. Hx of seizures.        Reason for Admission  EMS     Admission Date  5/25/2024    CODE STATUS  Full Code    HPI & HOSPITAL COURSE  Braydon Sotelo is a 28 y.o. male who presented 5/25/2024 with past medical history of autism, seizures, sleep apnea who presents to the hospital for unresponsive event.  Patient found to be unresponsive at home by father. Since the patient was unresponsive the father called EMS and then started to perform CPR.  It is unknown how long the event lasted.  When EMS arrived the patient woke up and was found to be combative and postictal.  The patient did urinate over himself but did not bite his tongue.  He was given IV Versed.  Has been a long time since he had a seizure event.  The patient is compliant with his home seizure medications and has no recent changes.  Patient denies any recent illness, father reports patient has irregular sleep schedule and does not wear his CPAP at home. Patients post ictal phase from suspected seizure resolved. He is resumed on his home seizure medications. EEG negative for active seizures. No reversible cause of seizure cause found. Patient feeling well. CXR and EKG also reassuring. Patient is discharged to home, he is to follow up with his neurologist.    Therefore, he is discharged in fair and stable condition to home with close outpatient follow-up.    The patient recovered much more quickly than anticipated on admission.    Discharge Date  5/25/2024    FOLLOW UP ITEMS POST DISCHARGE  Take medications as prescribed.  Follow up with neurology.    DISCHARGE DIAGNOSES  Principal Problem:    Seizure (HCC) (POA: Yes)  Active Problems:    Acute respiratory failure with hypoxia and hypercapnia (HCC) (POA: Unknown)    Sleep apnea (POA: Unknown)  Resolved Problems:    * No resolved hospital problems. *      FOLLOW  UP  No future appointments.  No follow-up provider specified.    MEDICATIONS ON DISCHARGE     Medication List        START taking these medications        Instructions   Midazolam 5 MG/0.1ML Soln   Administer 1 Each into affected nostril(S) as needed (seizure) for up to 1 day.  Dose: 1 Each            CONTINUE taking these medications        Instructions   B-12 PO   Take 1 Tablet by mouth every day.  Dose: 1 Tablet     Briviact 50 MG Tabs tablet  Generic drug: brivaracetam   Take 50 mg by mouth 2 times a day.  Dose: 50 mg     citalopram 20 MG Tabs  Commonly known as: CeleXA   Take 20 mg by mouth every evening.  Dose: 20 mg     clonazePAM 1 MG Tabs  Commonly known as: KlonoPIN   Take 1 mg by mouth every evening.  Dose: 1 mg     LamoTRIgine 200 MG Tb24   Take 200 mg by mouth every day.  Dose: 200 mg     vitamin D2 (Ergocalciferol) 1.25 MG (74947 UT) Caps capsule  Commonly known as: Drisdol   Take 50,000 Units by mouth every 7 days.  Dose: 50,000 Units              Allergies  Allergies   Allergen Reactions    Phentermine Unspecified     anger       DIET  Orders Placed This Encounter   Procedures    Diet Order Diet: Regular     Standing Status:   Standing     Number of Occurrences:   1     Order Specific Question:   Diet:     Answer:   Regular [1]       ACTIVITY  As tolerated.  Weight bearing as tolerated    CONSULTATIONS  neurology    PROCEDURES  none    LABORATORY  Lab Results   Component Value Date    SODIUM 140 05/25/2024    POTASSIUM 4.1 05/25/2024    CHLORIDE 102 05/25/2024    CO2 20 05/25/2024    GLUCOSE 84 05/25/2024    BUN 21 05/25/2024    CREATININE 1.21 05/25/2024        Lab Results   Component Value Date    WBC 7.8 05/25/2024    HEMOGLOBIN 16.6 05/25/2024    HEMATOCRIT 49.3 05/25/2024    PLATELETCT 221 05/25/2024        Total time of the discharge process exceeds 35 minutes.

## 2024-05-25 NOTE — WOUND TEAM
[facility]  Inpatient Services  Wound and Skin Care Brief Evaluation    Admission Date: 5/25/2024     Last order of IP CONSULT TO WOUND CARE was found on 5/25/2024 from Hospital Encounter on 5/25/2024     HPI, PMH, SH: Reviewed    Chief Complaint   Patient presents with    Seizure     Witnessed seizure by parents. Unknown duration. Hx of seizures.      Diagnosis: Seizure (HCC) [R56.9]    Unit where seen by Wound Team: S177/02     Wound consult placed regarding right knee. Chart and images reviewed. This discussed with bedside MARY Rojo. Dry scab to left knee.     No pressure injuries or advanced wound care needs identified. Wound consult completed. No further follow up unless indicated and consulted.          PREVENTATIVE INTERVENTIONS:    Q shift Arthur - performed per nursing policy  Q shift pressure point assessments - performed per nursing policy    Dressing orders in place for bedside nursing.

## 2024-05-25 NOTE — H&P
Hospital Medicine History & Physical Note    Date of Service  5/25/2024    Primary Care Physician  Rajat Burciaga M.D.    Consultants      Specialist Names:     Code Status  Full Code    Chief Complaint  Chief Complaint   Patient presents with    Seizure     Witnessed seizure by parents. Unknown duration. Hx of seizures.        History of Presenting Illness  Napoleon Damico is a 28 y.o. male who presented 5/25/2024 with past medical history of autism, seizures, sleep apnea who presents to the hospital for a syncopal event.  The patient was found by his father when he was snoring loudly in the living room.  When he tried to wake him up the patient was unresponsive.  Patient supposed to wear CPAP but refuses to wear it.  He was eating a burrito prior to going to sleep.  Since the patient was unresponsive the father called EMS and then started to perform CPR.  It is unknown how long the event lasted.  When EMS arrived the patient woke up and was found to be combative and postictal.  The patient did urinate over himself but did not bite his tongue.  He was given IV Versed.  Has been a long time since he had a seizure event.  The family does follow-up with a neurologist as an outpatient but their next appointment is in July.  The patient is compliant with his home seizure medications and has no recent changes.  Family refuses fevers, chills, nausea, vomiting, diarrhea.    Chest x-ray interpreted by me found no acute pulmonary process  EKG interpreted by me found normal sinus rhythm without ST segment changes    I discussed the plan of care with patient.    Review of Systems  Review of Systems   Constitutional:  Negative for chills, diaphoresis, fever and malaise/fatigue.   HENT:  Negative for congestion, ear discharge, ear pain, hearing loss, nosebleeds, sinus pain, sore throat and tinnitus.    Eyes:  Negative for blurred vision, double vision, photophobia and pain.   Respiratory:  Negative for cough, hemoptysis, sputum  production, shortness of breath, wheezing and stridor.    Cardiovascular:  Negative for chest pain, palpitations, orthopnea, claudication, leg swelling and PND.   Gastrointestinal:  Negative for abdominal pain, blood in stool, constipation, diarrhea, heartburn, melena, nausea and vomiting.   Genitourinary:  Negative for dysuria, flank pain, frequency, hematuria and urgency.   Musculoskeletal:  Negative for back pain, falls, joint pain, myalgias and neck pain.   Skin:  Negative for itching and rash.   Neurological:  Positive for seizures and loss of consciousness. Negative for dizziness, tingling, tremors, weakness and headaches.   Endo/Heme/Allergies:  Negative for environmental allergies and polydipsia. Does not bruise/bleed easily.   Psychiatric/Behavioral:  Negative for depression, hallucinations, substance abuse and suicidal ideas.        Past Medical History   has no past medical history on file.    Surgical History   has no past surgical history on file.     Family History  family history is not on file.   Family history reviewed with patient. There is no family history that is pertinent to the chief complaint.     Social History       Allergies  Allergies   Allergen Reactions    Phentermine Unspecified     anger       Medications  Prior to Admission Medications   Prescriptions Last Dose Informant Patient Reported? Taking?   Cyanocobalamin (B-12 PO) 5/24/2024 at 0700 Family Member Yes Yes   Sig: Take 1 Tablet by mouth every day.   LamoTRIgine 200 MG TABLET SR 24 HR 5/24/2024 at 0700 Family Member Yes Yes   Sig: Take 200 mg by mouth every day.   brivaracetam (BRIVIACT) 50 MG Tab tablet 5/24/2024 at 0700 Family Member Yes Yes   Sig: Take 50 mg by mouth 2 times a day.   citalopram (CELEXA) 20 MG Tab 5/24/2024 at 2300 Family Member Yes Yes   Sig: Take 20 mg by mouth every evening.   clonazePAM (KLONOPIN) 1 MG Tab 5/24/2024 at 2300 Family Member Yes Yes   Sig: Take 1 mg by mouth every evening.   vitamin D2,  Ergocalciferol, (DRISDOL) 1.25 MG (65644 UT) Cap capsule unk at unk Family Member Yes Yes   Sig: Take 50,000 Units by mouth every 7 days.      Facility-Administered Medications: None       Physical Exam  Temp:  [36.6 °C (97.8 °F)] 36.6 °C (97.8 °F)  Pulse:  [86-94] 86  Resp:  [12-20] 20  BP: (118-130)/(67-73) 130/67  SpO2:  [85 %-96 %] 92 %  Blood Pressure: 130/67   Temperature: 36.6 °C (97.8 °F)   Pulse: 86   Respiration: 20   Pulse Oximetry: 92 %       Physical Exam  Vitals and nursing note reviewed.   Constitutional:       General: He is not in acute distress.     Appearance: Normal appearance. He is not ill-appearing, toxic-appearing or diaphoretic.   HENT:      Head: Normocephalic and atraumatic.      Nose: No congestion or rhinorrhea.      Mouth/Throat:      Pharynx: No posterior oropharyngeal erythema.   Eyes:      General: No scleral icterus.        Right eye: No discharge.   Cardiovascular:      Rate and Rhythm: Normal rate and regular rhythm.      Pulses: Normal pulses.      Heart sounds: Normal heart sounds. No murmur heard.     No friction rub. No gallop.   Pulmonary:      Effort: Pulmonary effort is normal. No respiratory distress.      Breath sounds: Normal breath sounds. No stridor. No wheezing, rhonchi or rales.   Abdominal:      General: There is no distension.      Tenderness: There is no abdominal tenderness.   Musculoskeletal:         General: No swelling, tenderness, deformity or signs of injury.      Cervical back: Normal range of motion.      Right lower leg: No edema.      Left lower leg: No edema.   Skin:     Capillary Refill: Capillary refill takes more than 3 seconds.      Coloration: Skin is not jaundiced or pale.      Findings: No bruising, erythema, lesion or rash.   Neurological:      General: No focal deficit present.      Mental Status: He is alert and oriented to person, place, and time.         Laboratory:  Recent Labs     05/25/24  0302   WBC 7.8   RBC 5.58   HEMOGLOBIN 16.6  "  HEMATOCRIT 49.3   MCV 88.4   MCH 29.7   MCHC 33.7   RDW 39.2   PLATELETCT 221   MPV 10.1     Recent Labs     05/25/24  0302   SODIUM 140   POTASSIUM 4.1   CHLORIDE 102   CO2 20   GLUCOSE 84   BUN 21   CREATININE 1.21   CALCIUM 8.8     Recent Labs     05/25/24  0302   ALTSGPT 33   ASTSGOT 29   ALKPHOSPHAT 82   TBILIRUBIN 0.2   GLUCOSE 84         No results for input(s): \"NTPROBNP\" in the last 72 hours.      No results for input(s): \"TROPONINT\" in the last 72 hours.    Imaging:  DX-CHEST-PORTABLE (1 VIEW)   Final Result      No acute process.          X-Ray:  I have personally reviewed the images and compared with prior images.  EKG:  I have personally reviewed the images and compared with prior images.    Assessment/Plan:  Justification for Admission Status  I anticipate this patient will require at least two midnights for appropriate medical management, necessitating inpatient admission because seizures    Patient will need a Med/Surg bed on MEDICAL service .  The need is secondary to seizures.    * Seizure (HCC)- (present on admission)  Assessment & Plan  It is possible that patient has sleep apnea and from the severe hypoxia or hypercapnia that it incited a seizure event  Seizure precautions  Patient does not have any focal deficits and I will not order any brain imaging  Monitor electrolytes  Continue home seizure medications  Consider neurology consult    Sleep apnea  Assessment & Plan  CPAP is ordered    Acute respiratory failure with hypoxia and hypercapnia (HCC)  Assessment & Plan  On 5 L of O2 above baseline  I will give 1 dose of DuoNebs  Possible aspiration versus hypoxia from sleep apnea  Check D-dimer, BNP, troponin, procalcitonin        VTE prophylaxis: SCDs/TEDs  "

## 2024-05-25 NOTE — PROCEDURES
INPATIENT ROUTINE VIDEO ELECTROENCEPHALOGRAM REPORT    REFERRING PROVIDER: Praful Prather M.d.  DOS: 5/25/2024  STUDY DURATION: 0 hours and 22 minutes of total recording time.     INDICATION:  [place] 28 y.o. male presenting with seizure(s)    RELEVANT MEDICATIONS/TREATMENTS:   Scheduled Medications   Medication Dose Frequency    brivaracetam  50 mg BID    citalopram  20 mg Q EVENING    clonazePAM  1 mg Nightly    lamoTRIgine  100 mg BID       TECHNIQUE:   Routine VEEG was set up by a Neurodiagnostic technologist who performed education to the patient and staff. A minimum of 23 electrodes and 23 channel recording was setup and performed by Neurodiagnostic technologist, in accordance with the international 10-20 system. The study was reviewed in bipolar and referential montages. The recording examined the patient in the  awake, drowsy, and sleep state(s).     DESCRIPTION OF THE RECORD:  During brief periods of wakefulness, the background was continuous and showed generalized a 7 Hz posterior dominant rhythm.  There was reactivity to eye closure/opening.      EEG Sleep: Sleep was captured and was characterized by diffuse background delta/theta activity with a loss of myogenic artifact.  N2 sleep transients in the form of sleep spindles and vertex waves were seen in the leads over the central regions.     Diffuse excess beta activity was present throughout.     ICTAL AND INTERICTAL FINDINGS:   No focal or generalized epileptiform activity noted.     No regional slowing or persistent focal asymmetries were seen.    No seizures.     ACTIVATION PROCEDURES:   Intermittent Photic stimulation was performed in a stepwise fashion from 1 to 30 Hz and did not elicited any abnormalities on EEG.     EKG: Sampling of the EKG recording showed sinus rhythm    EVENTS:  None    INTERPRETATION:   Abnormal video EEG recording in the awake, drowsy, and sleep state(s):  - Mild continuous generalized slowing of the background with  excess beta activity diffusely. This finding is suggestive of mild cerebral dysfunction of a nonspecific etiology, with medication effects also present.  - No regional slowing or persistent focal asymmetries were seen.  - No epileptiform discharges or other epileptiform phenomena seen.  - No seizures. Clinical correlation is recommended.      Note: This EEG does not rule out the possibility of seizures or exclude a diagnosis of epilepsy.  If the clinical suspicion remains high for seizures, a prolonged recording to capture clinical or subclinical events may be helpful.    Betty Agosto MD  Department of Neurology at [facility]  General Neurologist and Epileptologist   of Clinical Neurology, [facility].

## 2024-05-25 NOTE — ED NOTES
Med rec complete per Family at bedside.  Allergies reviewed             Has patient had any outside antibiotics in the last 30 days? N    Any Anticoagulants (rivaroxaban, apixaban, edoxaban, dabigatran, warfarin, enoxaparin) taken in the last 14 days? N     Pharmacy/Pharmacies Pt utilizes : [facility] [phone]

## 2024-05-25 NOTE — PROGRESS NOTES
Reviewed dc plans with Russ pabon via phone.   All questions answered. Parents will be here to  patient in 30 min. Instructed to call unit and we can take patient to car.   Educated dad and patient about importance of wearing CPAP at night       Called pharmacy, Midazolam unavailable until 5/28. Russ updated and requested to send rx to [facility] on [address]. MD aware.     Rx sent to pharmacy.     1510: Pt dc to home, all belongings sent   .

## 2024-05-25 NOTE — ED NOTES
"Parents at bedside. They heard loud snore, \"aspiration\" Pt was unresponsive for 15 minutes per family. Pt was found lying on couch, pale. Pt was making gurgling noises. No seizure activity witnessed. Father laid pt to floor. Father did not feel pulse and started CPR. Immediately after pt was responding to pain. Pt has Aspergers and seizure history. Last seizure 1-2 years ago. Denies recent illness or injury.  "

## 2024-05-25 NOTE — PROGRESS NOTES
4 Eyes Skin Assessment Completed by MARY Horton and MARY Paulino.    Head WDL  Ears WDL  Nose WDL  Mouth Tongue slightly swollen  Neck WDL  Breast/Chest WDL  Shoulder Blades WDL  Spine WDL  (R) Arm/Elbow/Hand WDL  (L) Arm/Elbow/Hand WDL  Abdomen WDL  Groin WDL  Scrotum/Coccyx/Buttocks Redness and Blanching  (R) Leg Scab  (L) Leg WDL  (R) Heel/Foot/Toe Redness and Blanching  (L) Heel/Foot/Toe Redness and Blanching          Devices In Places Pulse Ox and Nasal Cannula      Interventions In Place NC W/Ear Foams, Pillows, and Pressure Redistribution Mattress    Possible Skin Injury Yes    Pictures Uploaded Into Epic Yes  Wound Consult Placed Yes  RN Wound Prevention Protocol Ordered No

## 2024-05-25 NOTE — ASSESSMENT & PLAN NOTE
On 5 L of O2 above baseline  I will give 1 dose of DuoNebs  Possible aspiration versus hypoxia from sleep apnea  Check D-dimer, BNP, troponin, procalcitonin

## 2024-05-25 NOTE — CONSULTS
Neurology Initial Consult H&P  Neurohospitalist Service, [facility]    Referring Physician: Praful Prather M.D.    Chief Complaint   Patient presents with    Seizure     Witnessed seizure by parents. Unknown duration. Hx of seizures.        HPI: Napoleon Damico is a 28 y.o. man with epilepsy presenting with seizures for whom neurology has been consulted for management.  Some history obtained from the patient.  Additional history from the EMR and care team. Unable to reach patient contact for observer history.     Patient reports that he has had seizures in the past and was diagnosed with epilepsy in 2013.  He does not recall prior medications reports that he currently takes Briviact and lamotrigine and clonazepam.  He does endorse missed dosing of his antiseizure medications recently.  Does not know the dosing of his antiseizure medications.  No known illness or recent sick contacts.    Patient is amnestic to recent seizure activity leading to hospital admission.  His next memory was at [facility] emergency department.    No seizure recurrence this admission.  Patient feels a lot better today however has a sore tongue on the right side.  No new neurologic symptoms.    Patient tells me he does not have a current neurologist with whom he follows.  Last neurology visit in our system appears to be July 2022.  Patient reportedly has 1 seizure type described as generalized tonic-clonic seizures without aura.  Seizures seemingly well-controlled. At that time was on lamictal ER 200mg in am, Keppra ER 1500mg QHS, clonazepam 1mg QHS. Ativan 1mg PRN for breakthrough seizure. At some point was switched to Briviact. Dosing somewhat unclear.     Review of systems: In addition to what is detailed in the HPI above, all other systems reviewed and are negative.    Past Medical History:    has no past medical history on file.    FHx:  family history is not on file.    SHx:       Allergies:  Allergies   Allergen  Reactions    Phentermine Unspecified     anger       Medications:    Current Facility-Administered Medications:     acetaminophen (Tylenol) tablet 650 mg, 650 mg, Oral, Q6HRS PRN, Neri Bourgeois M.D.    ondansetron (Zofran) syringe/vial injection 4 mg, 4 mg, Intravenous, Q4HRS PRN, Neri Bourgeois M.D.    ondansetron (Zofran ODT) dispertab 4 mg, 4 mg, Oral, Q4HRS PRN, Neri Bourgeois M.D.    promethazine (Phenergan) tablet 12.5-25 mg, 12.5-25 mg, Oral, Q4HRS PRN, Neri Bourgeois M.D.    promethazine (Phenergan) suppository 12.5-25 mg, 12.5-25 mg, Rectal, Q4HRS PRN, Neri Bourgeois M.D.    prochlorperazine (Compazine) injection 5-10 mg, 5-10 mg, Intravenous, Q4HRS PRN, Neri Bourgeois M.D.    labetalol (Normodyne/Trandate) injection 10 mg, 10 mg, Intravenous, Q4HRS PRN, Neri Bourgeois M.D.    brivaracetam (Briviact) tablet 50 mg, 50 mg, Oral, BID, Neri Bourgeois M.D., 50 mg at 05/25/24 0638    citalopram (CeleXA) tablet 20 mg, 20 mg, Oral, Q EVENING, Neri Bourgeois M.D.    clonazePAM (KlonoPIN) tablet 1 mg, 1 mg, Oral, Nightly, Neri Bourgeois M.D.    lamoTRIgine (LaMICtal) tablet 100 mg, 100 mg, Oral, BID, Neri Bourgeois M.D., 100 mg at 05/25/24 0638    Physical Examination:     General: Patient is awake and in no acute distress  Eye: Examination of optic disks not indicated at this time given acuity of consult  Neck: There is normal range of motion  CV: regular rate   Extremities:  clear, dry, intact, without peripheral edema    NEUROLOGICAL EXAM:     /64   Pulse 81   Temp 36.3 °C (97.3 °F) (Temporal)   Resp 17   Ht 1.829 m (6')   Wt 106 kg (234 lb 9.1 oz)   SpO2 95%   BMI 31.81 kg/m²       Mental status: Awake, alert and oriented.  Attention is intact.  Answers questions appropriately.  Language: Fluent with intact comprehension.  Mild lingual dysarthria.  Cranial nerves:    Pupils are equal, round and reactive to light  Intact visual fields  Versions are full   Intact muscles of mastication    Intact facial  "sensation   Face appears symmetric   Hearing is intact to conversation   Symmetric shoulder shrug   Symmetric elevation of the palate; uvula appears midline   Tongue protrusion is midline  Motor: Normal bulk and tone. Moves all extremities symmetrically. Antigravity without drift. No abnormal movements or postures.   Reflexes: Deep tendon reflexes 2 and symmetric in the bilateral upper and lower extremities. Bilateral plantar responses are flexor.   Sensory: Intact to light-touch.   Coordination: No dysmetria.  Gait: Not tested.     Objective Data:    Labs:  No results found for: \"PROTHROMBTM\", \"INR\"   Lab Results   Component Value Date/Time    WBC 7.8 05/25/2024 03:02 AM    RBC 5.58 05/25/2024 03:02 AM    HEMOGLOBIN 16.6 05/25/2024 03:02 AM    HEMATOCRIT 49.3 05/25/2024 03:02 AM    MCV 88.4 05/25/2024 03:02 AM    MCH 29.7 05/25/2024 03:02 AM    MCHC 33.7 05/25/2024 03:02 AM    MPV 10.1 05/25/2024 03:02 AM    NEUTSPOLYS 57.00 05/25/2024 03:02 AM    LYMPHOCYTES 31.30 05/25/2024 03:02 AM    MONOCYTES 7.30 05/25/2024 03:02 AM    EOSINOPHILS 3.10 05/25/2024 03:02 AM    BASOPHILS 0.40 05/25/2024 03:02 AM      Lab Results   Component Value Date/Time    SODIUM 140 05/25/2024 03:02 AM    POTASSIUM 4.1 05/25/2024 03:02 AM    CHLORIDE 102 05/25/2024 03:02 AM    CO2 20 05/25/2024 03:02 AM    GLUCOSE 84 05/25/2024 03:02 AM    BUN 21 05/25/2024 03:02 AM    CREATININE 1.21 05/25/2024 03:02 AM      No results found for: \"CHOLSTRLTOT\", \"LDL\", \"HDL\", \"TRIGLYCERIDE\"    Lab Results   Component Value Date/Time    ALKPHOSPHAT 82 05/25/2024 03:02 AM    ASTSGOT 29 05/25/2024 03:02 AM    ALTSGPT 33 05/25/2024 03:02 AM    TBILIRUBIN 0.2 05/25/2024 03:02 AM        Imaging/Testing:    I interpreted and/or reviewed the patient's neuroimaging    DX-CHEST-PORTABLE (1 VIEW)   Final Result      No acute process.          Impression and Recommendations: Napoleon Damico is a 28 y.o. man with epilepsy presenting with seizures for whom neurology has " been consulted for management.  Breakthrough seizure in the setting of missed antiseizure medication dosing as reported by the patient.  No other clear trigger or precipitant identified at this time.  Reportedly taking Lamictal 200 mg sustained-release daily, Briviact 50 mg twice daily and Klonopin 1 mg at bedtime. EEG completed; report is pending.  Prior MRI brain from January 2024 and 2013 were non-lesional.  Clinically the patient seems to be feeling much better today and there have been no reported interval seizures.  No further workup is necessary at this time from a neurologic perspective unless the patient has additional seizure activity.  I was unable to obtain observer history at the time of this encounter.  Will need close follow-up with outpatient neurologist or establish care with a new provider. Please continue home regimen of anti-seizure medications. Seizure precautions and q shift neuro checks. Please reach out with any questions.   -Follow-up EEG report.  -Continue home regimen of anti-seizure medications.      Ferny Ledezma MD  Neurohospitalist, Acute Care Services      The evaluation of the patient, and recommended management, was discussed with Dr. Prather     I personally provided 65 minutes of total acute neurologic care time outside of time spent on separately billable/documented procedures. Time includes: review of laboratory data, review of radiology studies, discussion with consultants, discussion with family/patient, monitoring for potential decompensation.  Interventions were performed as documented in the chart.        Please note that this dictation was created using voice recognition software.  I have made every reasonable attempt to correct obvious errors, but I expect that there are errors of grammar and possibly content that I did not discover before finalizing the note.

## 2024-05-25 NOTE — ED PROVIDER NOTES
ED Provider Note    CHIEF COMPLAINT  Chief Complaint   Patient presents with    Seizure     Witnessed seizure by parents. Unknown duration. Hx of seizures.        EXTERNAL RECORDS REVIEWED  Outpatient Notes alternative medical record reviewed.  Patient by the name of Braydon Sotelo.  Patient is 28-year-old male with a history of autism, seizure disorder.  Patient last seen by a neurologist on 7/15/2022. Current ASM's: lamictal ER 200mg in am, Briviact 50 mg BID, clonazepam 1mg QHS. Ativan 1mg PRN for breakthrough seizure    HPI/ROS  LIMITATION TO HISTORY   Select: Altered mental status / Confusion  OUTSIDE HISTORIAN(S):  Family mother and father at bedside providing majority of history    Glover Eleven is a 28 y.o. male who presents to the emergency department for evaluation of a suspected seizure.  Parents report that the patient has a history of seizures though has not had one in many months.  They report that he has been adherent to his medical therapy including Briviact, Lamictal, clonazepam including last night.  They state that the patient did not sleep well last night, was resistant to sleeping and wearing his CPAP which she is post to wear at night.  Mother reports that she heard abnormal breathing with loud snoring this morning which awoke her from sleep.  She checked on the patient and found him to be unresponsive, breathing deeply.  Father called EMS who recommended he start CPR which he performed with chest compressions and mouth-to-mouth though he states that shortly after beginning this the patient began breathing.  On EMS arrival patient was found to be incontinent, was agitated and received 10 mg total of Versed.  Parents report the patient has begun to wake up and has been answering some questions.  They report he does not wear oxygen during the daytime but does wear CPAP at night.  They state he is otherwise been in his normal state of health for the last few days and denies any recent fevers,  cough, nausea vomiting diarrhea or any additional symptoms.    PAST MEDICAL HISTORY   Autism, epilepsy    SURGICAL HISTORY  patient denies any surgical history    FAMILY HISTORY  No family history on file.    SOCIAL HISTORY  Social History     Tobacco Use    Smoking status: Not on file    Smokeless tobacco: Not on file   Substance and Sexual Activity    Alcohol use: Not on file    Drug use: Not on file    Sexual activity: Not on file       CURRENT MEDICATIONS  Home Medications       Reviewed by Buddy Manley (Pharmacy Tech) on 05/25/24 at 0409  Med List Status: Complete     Medication Last Dose Status   brivaracetam (BRIVIACT) 50 MG Tab tablet 5/24/2024 Active   citalopram (CELEXA) 20 MG Tab 5/24/2024 Active   clonazePAM (KLONOPIN) 1 MG Tab 5/24/2024 Active   Cyanocobalamin (B-12 PO) 5/24/2024 Active   LamoTRIgine 200 MG TABLET SR 24 HR 5/24/2024 Active   vitamin D2, Ergocalciferol, (DRISDOL) 1.25 MG (18619 UT) Cap capsule unk Active                  Audit from Redirected Encounters    **Home medications have not yet been reviewed for this encounter**         ALLERGIES  Allergies   Allergen Reactions    Phentermine Unspecified     anger       PHYSICAL EXAM  VITAL SIGNS: /73   Pulse 88   Temp 36.6 °C (97.8 °F) (Temporal)   Resp 18   Ht 1.829 m (6')   Wt 109 kg (240 lb)   SpO2 92%   BMI 32.55 kg/m²    Constitutional: Sluggish but does awaken and answers questions  HEENT: Atraumatic, normocephalic, pupils are equal round reactive to light, nose normal, mouth shows moist mucous membranes, no tongue trauma  Neck: Supple, no JVD, no tracheal deviation  Cardiovascular: Regular rate and rhythm, no murmur, rub or gallop, 2+ pulses peripherally x4  Thorax & Lungs: No respiratory distress, no wheezes, crackles bilaterally in the bases  GI: Soft, non-distended, non-tender, no rebound  Skin: Warm, dry, no acute rash or lesion  Musculoskeletal: Moving all extremities, no acute deformity, no edema, no  tenderness  Neurologic: A&Ox3, at baseline mentation, moving all extremities and following simple commands  Psychiatric: Appropriate affect for situation at this time      EKG/LABS  Labs Reviewed   COMP METABOLIC PANEL - Abnormal; Notable for the following components:       Result Value    Anion Gap 18.0 (*)     All other components within normal limits   CBC WITH DIFFERENTIAL   ESTIMATED GFR   URINALYSIS,CULTURE IF INDICATED   POCT COV-2, FLU A/B, RSV BY PCR   POC COV-2, FLU A/B, RSV BY PCR     Results for orders placed or performed during the hospital encounter of 24   EKG (NOW)   Result Value Ref Range    Report       [facility] Emergency Dept.    Test Date:  2024  Pt Name:    AINSLEY CHRISTINA                Department: ER  MRN:        [id number]                      Room:        11  Gender:     Male                         Technician: [id number]  :        1995                   Requested By:ER TRIAGE PROTOCOL  Order #:    [id number]                    Reading MD: Edgar Jason    Measurements  Intervals                                Axis  Rate:       91                           P:          49  VT:         185                          QRS:        29  QRSD:       94                           T:          18  QT:         372  QTc:        458    Interpretive Statements  Sinus rhythm  No previous ECG available for comparison  Electronically Signed On 2024 03:38:18 PDT by Edgar Jason         I have independently interpreted this EKG    RADIOLOGY/PROCEDURES   I have independently interpreted the diagnostic imaging associated with this visit and am waiting the final reading from the radiologist.   My preliminary interpretation is as follows: Chest x-ray with no obvious pneumonitis or consolidation consistent with pneumonia    Radiologist interpretation:  DX-CHEST-PORTABLE (1 VIEW)   Final Result      No acute process.          COURSE & MEDICAL DECISION MAKING    ASSESSMENT,  COURSE AND PLAN    Care Narrative:     Patient presents to the emergency department brought in by EMS for further evaluation of a likely seizure that occurred tonight.  Known history of epilepsy on multiple antiseizure medications which per parents he is adherent to.  Has not had any recent illness type symptoms to explain a provoked seizure.  Was quite agitated on EMS arrival and received a total of 10 mg Versed IV prehospital he.  At the time of my evaluation patient is sluggish but does awaken and answer questions, is hemodynamically stable and appears to be returning to baseline mentation, now awakening and answering questions.  No focal neurologic deficits appreciated.  No evidence of significant tongue trauma.  Patient is hypoxic however requiring 6 L supplemental oxygen.  Suspect aspiration in the setting of his seizure.  Will obtain x-ray to further assess and laboratory testing to screen for viral illness, UTI, electrolyte derangement which would lower his seizure threshold.  Blood sugar of 194 prehospital.    On reassessment patient's mentation is steadily improving though he remains sluggish.  With verbal stimulus patient does awaken and answer questions very appropriately with no neurologic deficits appreciated.  Suspect ongoing sluggishness in the setting of high-dose benzodiazepines given prior to arrival.  His laboratory testing is quite unremarkable other than an anion gap metabolic acidosis likely indicative of a lactic acidosis from his recent seizure.  Otherwise no significant electrolyte abnormality, leukocytosis or anemia.  Viral testing negative.  Chest x-ray with no evidence of pneumonitis.  Even while awake patient continues to desaturate requiring 4 L supplemental oxygen.  Suspect aspiration.  Will plan for admission to the hospital for continued evaluation to ensure no developing pneumonitis.  Discussed this with the patient's parents were comfortable with this plan.    Hydration: Based on  the patient's presentation of Dehydration the patient was given IV fluids. IV Hydration was used because oral hydration was not adequate alone. Upon recheck following hydration, the patient was improved.          ADDITIONAL PROBLEMS MANAGED  None    DISPOSITION AND DISCUSSIONS  I have discussed management of the patient with the following physicians and GUILLERMINA's: Dr. Bourgeois, hospitalist    FINAL DIAGNOSIS  1. Acute hypoxic respiratory failure (HCC)    2. Seizure (HCC)    3. Aspiration into airway, initial encounter           Electronically signed by: Edgar Jason M.D., 5/25/2024 3:28 AM       no

## 2024-05-25 NOTE — ED TRIAGE NOTES
"Chief Complaint   Patient presents with    Seizure     Witnessed seizure by parents. Unknown duration. Hx of seizures.      BIB EMS. Per EMS pt lives with parents and pt has hx of seizures. Last one was \"a long time ago.\" Parents are driving to hospital. 18 GA IV Lt AC. Given total 10 mg versed IVP by EMS. , /79, SPO2 96% 2 L/min NC, GCS 9, .     Pt somnolent, alert to name and city. Unable to verbalize complaints on arrival.Pt has dried blood to right nare. +incontinence, -oral trauma noted.     /73   Pulse 94   Temp 36.6 °C (97.8 °F) (Temporal)   Resp 12   Ht 1.829 m (6')   Wt 109 kg (240 lb)   SpO2 (!) 85%   BMI 32.55 kg/m²       "

## 2024-05-25 NOTE — ED NOTES
Pt 85 % O2 RA. Pt placed to 6L/min NC. Pt placed in gown, on cardiac monitor, labs drawn and sent. Side rails padded. Bed alarm in use.

## 2024-05-26 PROCEDURE — RXMED WILLOW AMBULATORY MEDICATION CHARGE: Performed by: STUDENT IN AN ORGANIZED HEALTH CARE EDUCATION/TRAINING PROGRAM

## 2024-05-28 ENCOUNTER — PHARMACY VISIT (OUTPATIENT)
Dept: PHARMACY | Facility: MEDICAL CENTER | Age: 29
End: 2024-05-28
Payer: COMMERCIAL

## 2024-05-28 ENCOUNTER — PATIENT OUTREACH (OUTPATIENT)
Dept: MEDICAL GROUP | Age: 29
End: 2024-05-28
Payer: MEDICARE

## 2024-05-28 NOTE — PROGRESS NOTES
Transitional Care Management  TCM Outreach Date and Time: Filed (5/28/2024  2:37 PM)    Discharge Questions  Actual Discharge Date: 05/25/24  Now that you are home, how are you feeling?: Good  Did you receive any new prescriptions?: Yes  Were you able to get them filled?: Yes  Meds to Bed or Pharmacy filled?: Pharmacy  Do you have any questions about your current medications or new medications (Review Med Rec)?: No  Did you have any durable medical equipment ordered?: No  Do you have a follow up appointment scheduled with your PCP?: Yes  Appointment Date: 05/31/24  Appointment Time: 1340  Any issues or paperwork you wish to discuss with your PCP?: No  Are you (patient) able to get to the appointment?: Yes (father, moses, will be taking pt to appt)  If Home Health was ordered, have they contacted you (Patient): Not Applicable  Did you have enough support after your last discharge?: Yes  Does this patient qualify for the CCM program?: No    Transitional Care  Number of attempts made to contact patient: 1  Current or previous attempts completed within two business days of discharge? : Yes  Provided education regarding treatment plan, medications, self-management, ADLs?: Yes  Has patient completed an Advanced Directive?: No  Has the Care Manager's phone number provided?: No  Is there anything else I can help you with?: No    Discharge Summary  Chief Complaint: Seizure - Witnessed seizure by parents. Unknown duration. Hx of seizures. (unresponsive event. Pt found to be unresponsive at home by father,called EMS and then started to perform CPR. It is unknown how long the event lasted. When EMS arrived the pt woke up and was found to be combative and postictal.)  Admitting Diagnosis: ems  Discharge Diagnosis: seizure (EEG negative, pt to f/u with o/p neurology)

## 2024-05-31 ENCOUNTER — OFFICE VISIT (OUTPATIENT)
Dept: MEDICAL GROUP | Age: 29
End: 2024-05-31
Payer: MEDICARE

## 2024-05-31 VITALS
HEART RATE: 71 BPM | HEIGHT: 72 IN | WEIGHT: 234 LBS | TEMPERATURE: 97 F | OXYGEN SATURATION: 95 % | DIASTOLIC BLOOD PRESSURE: 80 MMHG | SYSTOLIC BLOOD PRESSURE: 116 MMHG | BODY MASS INDEX: 31.69 KG/M2

## 2024-05-31 DIAGNOSIS — R56.9 SEIZURE (HCC): ICD-10-CM

## 2024-05-31 DIAGNOSIS — Z09 HOSPITAL DISCHARGE FOLLOW-UP: ICD-10-CM

## 2024-05-31 ASSESSMENT — FIBROSIS 4 INDEX: FIB4 SCORE: 0.62

## 2024-05-31 NOTE — PROGRESS NOTES
This medical record contains text that has been entered with the assistance of computer voice recognition and dictation software.  Therefore, it may contain unintended errors in text, spelling, punctuation, or grammar      Chief Complaint   Patient presents with    [facility] Follow-up     SEIZURES         Braydon Sotelo is a 28 y.o. male here evaluation and management of: hospital follow up      HPI:           1. Hospital discharge follow-up      2. Seizure (HCC)    History of Present Illness  The patient is a 28-year-old male who presents for evaluation of seizure. He is accompanied by his father.    The patient experienced a seizure at 2:00 AM on Saturday morning, during which his father found him unresponsive, necessitating CPR. Upon regaining consciousness, he reported feeling unwell and observed the presence of fire paramedics on the . The father, however, is uncertain of the exact date of the seizure, but noted akin to aspiration in the patient's breathing. The patient's mother suspects he may be snoring. The patient has been utilizing his CPAP machine due to previous issues with the device. The father also reports poor sleep quality, with the patient waking up between 5 hours of sleep per night. The patient is scheduled to see Dr. Ron Nava on 07/17/2024.  In the emergency room which happened on May 25, 2024 he was given IV Versed and he was resting quietly without any complaints.  EEG was negative for active seizures in the ER no reversible causes found patient recovered well and he was discharged with instructions to follow-up with PCP and neurology.  Since the event he has not had another episode the patient was very fatigued on the day this happened.  He does have an appointment with neurology in July.        Current medicines (including changes today)  Current Outpatient Medications   Medication Sig Dispense Refill    Midazolam 5 MG/0.1ML Solution Administer  1 Each into affected nostril(S) as needed (seizure) for up to 1 day. 2 Each 2    Cyanocobalamin (B-12 PO) Take 1 Tablet by mouth every day.      clonazePAM (KLONOPIN) 1 MG Tab Take 1 mg by mouth every evening.      LamoTRIgine 200 MG TABLET SR 24 HR Take 1 Tablet by mouth every morning. 90 Tablet 2    BRIVIACT 50 MG Tab tablet Take 1 Tablet by mouth 2 times a day for 90 days. 180 Tablet 0    gemfibrozil (LOPID) 600 MG Tab Take 1 Tablet by mouth 2 times a day. 180 Tablet 0    ibuprofen (MOTRIN) 200 MG Tab Take 400 mg by mouth one time.      citalopram (CELEXA) 20 MG Tab Take 20 mg by mouth at bedtime.      vitamin D2, Ergocalciferol, (DRISDOL) 1.25 MG (98762 UT) Cap capsule Take 1 capsule by mouth once a week 5 Capsule 0    brivaracetam (BRIVIACT) 50 MG Tab tablet Take 50 mg by mouth 2 times a day. (Patient not taking: Reported on 5/31/2024)      vitamin D2, Ergocalciferol, (DRISDOL) 1.25 MG (75899 UT) Cap capsule Take 50,000 Units by mouth every 7 days. (Patient not taking: Reported on 5/31/2024)      citalopram (CELEXA) 20 MG Tab Take 20 mg by mouth every evening. (Patient not taking: Reported on 5/31/2024)      LamoTRIgine 200 MG TABLET SR 24 HR Take 200 mg by mouth every day. (Patient not taking: Reported on 5/31/2024)      cyanocobalamin (VITAMIN B-12) 100 MCG Tab Take 100 mcg by mouth every day. (Patient not taking: Reported on 5/31/2024)      clonazePAM (KLONOPIN) 1 MG Tab Take 1 mg by mouth every evening. (Patient not taking: Reported on 5/31/2024)       No current facility-administered medications for this visit.     He  has a past medical history of Autism, Autism spectrum disorder, and Seizure ([facility]).  He  has a past surgical history that includes tonsillectomy and adenoidectomy; other (1/10/2014); and tonsillectomy.  Social History     Tobacco Use    Smoking status: Never    Smokeless tobacco: Never   Vaping Use    Vaping status: Never Used   Substance Use Topics    Alcohol use: No     Alcohol/week: 0.0 oz     Drug use: No     Social History     Social History Narrative    Not on file     Family History   Problem Relation Age of Onset    No Known Problems Mother     Sleep Apnea Father     Hypertension Father     Obesity Father     Lung Disease Paternal Aunt     Lung Disease Paternal Grandmother     No Known Problems Maternal Grandmother     Diabetes Maternal Grandfather     Hyperlipidemia Paternal Grandfather      Family Status   Relation Name Status    Mo  Alive    Fa  Alive    PAunt      PGMo      MGMo      MGFa      PGFa           ROS    The pertinent  ROS findings can be seen in the HPI above.     All other systems reviewed and are negative     Objective:     /80 (BP Location: Left arm, Patient Position: Sitting, BP Cuff Size: Large adult)   Pulse 71   Temp 36.1 °C (97 °F) (Temporal)   Ht 1.829 m (6')   Wt 106 kg (234 lb)   SpO2 95%  Body mass index is 31.74 kg/m².      Physical Exam:    Constitutional: Alert, no distress.  Skin: No suspicious lesions  Eye: Equal, round and reactive, conjunctiva clear, lids normal.  ENMT: Lips without lesions, good dentition, oropharynx clear.  Neck: Trachea midline, no masses, no thyromegaly. No cervical or supraclavicular lymphadenopathy.  Respiratory: Unlabored respiratory effort, lungs clear to auscultation, no wheezes, no ronchi.  Cardiovascular: Normal S1, S2, no murmur, no edema  Abdomen: Soft, non-tender, no masses, no hepatosplenomegaly.        Assessment and Plan:   The following treatment plan was discussed    All recent labs and provider notes reviewed    1. Hospital discharge follow-up    2. Seizure (HCC)  We stressed the importance of adequate rest  We stressed the importance of medication compliance  He does not drink alcohol  Follow-up with neurology as scheduled          Instructed to Follow up in clinic or ER for worsening symptoms, difficulty breathing, lack of expected recovery, or should new symptoms or  problems arise.    Followup: Return in about 3 months (around 8/31/2024) for Reevaluation.

## 2024-06-20 ENCOUNTER — TELEPHONE (OUTPATIENT)
Dept: NEUROLOGY | Facility: MEDICAL CENTER | Age: 29
End: 2024-06-20
Payer: MEDICARE

## 2024-06-20 DIAGNOSIS — G40.001 PARTIAL IDIOPATHIC EPILEPSY WITH SEIZURES OF LOCALIZED ONSET, NOT INTRACTABLE, WITH STATUS EPILEPTICUS (HCC): ICD-10-CM

## 2024-06-20 NOTE — TELEPHONE ENCOUNTER
Pt's appt with Dr. Farr got rescheduled out to 8/12/24 and pt is almost out of seizure meds and needs a refill of all seizure meds asap sent to [facility] pharmacy on [address]. Please call back pt's dad Russ with an update at [phone].

## 2024-06-26 RX ORDER — BRIVARACETAM 50 MG/1
50 TABLET, FILM COATED ORAL 2 TIMES DAILY
Qty: 180 TABLET | Refills: 1 | Status: SHIPPED | OUTPATIENT
Start: 2024-06-26 | End: 2024-12-23

## 2024-06-26 NOTE — TELEPHONE ENCOUNTER
Refilled the following medication(s) below and sent it to the following pharmacy:      Requested Prescriptions     Signed Prescriptions Disp Refills    BRIVIACT 50 MG Tab tablet 180 Tablet 1     Sig: Take 1 Tablet by mouth 2 times a day for 180 days.     Authorizing Provider: [facility]           Mik Farr MD  Department of Neurology at [facility]  General Neurologist and Epileptologist  Director of [facility] Level III Comprehensive Epilepsy Program  Professor of Clinical Neurology, [facility].   Phone: [phone]  Fax: [phone]  E-mail: [email or web address]

## 2024-07-13 ENCOUNTER — PATIENT MESSAGE (OUTPATIENT)
Dept: MEDICAL GROUP | Age: 29
End: 2024-07-13
Payer: MEDICARE

## 2024-07-16 RX ORDER — CITALOPRAM 20 MG/1
20 TABLET ORAL
Qty: 90 TABLET | Refills: 2 | Status: SHIPPED | OUTPATIENT
Start: 2024-07-16

## 2024-07-17 ENCOUNTER — APPOINTMENT (OUTPATIENT)
Dept: NEUROLOGY | Facility: MEDICAL CENTER | Age: 29
End: 2024-07-17
Attending: STUDENT IN AN ORGANIZED HEALTH CARE EDUCATION/TRAINING PROGRAM
Payer: MEDICARE

## 2024-08-02 ENCOUNTER — APPOINTMENT (OUTPATIENT)
Dept: MEDICAL GROUP | Age: 29
End: 2024-08-02
Payer: MEDICARE

## 2024-08-02 VITALS
HEART RATE: 79 BPM | SYSTOLIC BLOOD PRESSURE: 126 MMHG | HEIGHT: 72 IN | TEMPERATURE: 98.1 F | OXYGEN SATURATION: 99 % | RESPIRATION RATE: 18 BRPM | WEIGHT: 235 LBS | BODY MASS INDEX: 31.83 KG/M2 | DIASTOLIC BLOOD PRESSURE: 86 MMHG

## 2024-08-02 DIAGNOSIS — Z00.00 ANNUAL PHYSICAL EXAM: ICD-10-CM

## 2024-08-02 DIAGNOSIS — E66.9 OBESITY (BMI 30-39.9): ICD-10-CM

## 2024-08-02 DIAGNOSIS — E78.00 PURE HYPERCHOLESTEROLEMIA: ICD-10-CM

## 2024-08-02 PROCEDURE — 3074F SYST BP LT 130 MM HG: CPT | Performed by: FAMILY MEDICINE

## 2024-08-02 PROCEDURE — 3079F DIAST BP 80-89 MM HG: CPT | Performed by: FAMILY MEDICINE

## 2024-08-02 PROCEDURE — 99214 OFFICE O/P EST MOD 30 MIN: CPT | Performed by: FAMILY MEDICINE

## 2024-08-02 ASSESSMENT — FIBROSIS 4 INDEX: FIB4 SCORE: 0.64

## 2024-08-02 NOTE — PROGRESS NOTES
This medical record contains text that has been entered with the assistance of computer voice recognition and dictation software.  Therefore, it may contain unintended errors in text, spelling, punctuation, or grammar      Verbal consent was acquired by the patient to use [employer] ambient listening note generation during this visit Yes       Chief Complaint   Patient presents with    Follow-Up     6 month         Braydon Sotelo is a 29 y.o. male here evaluation and management of: 6 MONTH      HPI:           1. Obesity (BMI 30-39.9)  History of Present Illness  The patient is a 29-year-old male who presents for evaluation of multiple medical concerns.    The patient reports overall well-being, however, he experiences fatigue, which he attributes to his nightly dose of clonazepam. He utilizes a CPAP machine for sleep apnea, which provides some relief. He maintains a consistent sleep schedule, working three days a week, and waking up at the same time each day. Upon returning home, he may nap or sleep slightly longer than usual. On Thursday nights, he remains awake due to lack of work the following day. He has regained weight.          Current medicines (including changes today)  Current Outpatient Medications   Medication Sig Dispense Refill    Multiple Vitamins-Minerals (MULTIVITAMIN MEN PO) Take  by mouth.      citalopram (CELEXA) 20 MG Tab Take 1 Tablet by mouth at bedtime. 90 Tablet 2    BRIVIACT 50 MG Tab tablet Take 1 Tablet by mouth 2 times a day for 180 days. 180 Tablet 1    Cyanocobalamin (B-12 PO) Take 1 Tablet by mouth every day.      clonazePAM (KLONOPIN) 1 MG Tab Take 1 mg by mouth every evening.      LamoTRIgine 200 MG TABLET SR 24 HR Take 1 Tablet by mouth every morning. 90 Tablet 2    gemfibrozil (LOPID) 600 MG Tab Take 1 Tablet by mouth 2 times a day. 180 Tablet 0    ibuprofen (MOTRIN) 200 MG Tab Take 400 mg by mouth one time.      vitamin D2, Ergocalciferol, (DRISDOL) 1.25 MG (65891 UT) Cap  capsule Take 1 capsule by mouth once a week 5 Capsule 0     No current facility-administered medications for this visit.     He  has a past medical history of Autism, Autism spectrum disorder, and Seizure (HCC).  He  has a past surgical history that includes tonsillectomy and adenoidectomy; other (1/10/2014); and tonsillectomy.  Social History     Tobacco Use    Smoking status: Never    Smokeless tobacco: Never   Vaping Use    Vaping status: Never Used   Substance Use Topics    Alcohol use: No     Alcohol/week: 0.0 oz    Drug use: No     Social History     Social History Narrative    Not on file     Family History   Problem Relation Age of Onset    No Known Problems Mother     Sleep Apnea Father     Hypertension Father     Obesity Father     Lung Disease Paternal Aunt     Lung Disease Paternal Grandmother     No Known Problems Maternal Grandmother     Diabetes Maternal Grandfather     Hyperlipidemia Paternal Grandfather      Family Status   Relation Name Status    Mo  Alive    Fa  Alive    PAunt      PGMo      MGMo      MGFa      PGFa           ROS    The pertinent  ROS findings can be seen in the HPI above.     All other systems reviewed and are negative     Objective:     /86 (BP Location: Left arm, Patient Position: Sitting, BP Cuff Size: Large adult)   Pulse 79   Temp 36.7 °C (98.1 °F) (Temporal)   Resp 18   Ht 1.829 m (6')   Wt 107 kg (235 lb)   SpO2 99%  Body mass index is 31.87 kg/m².      Physical Exam:    Constitutional: Alert, no distress.  Skin: No suspicious lesions  Eye: Equal, round and reactive, conjunctiva clear, lids normal.  ENMT: Lips without lesions, good dentition, oropharynx clear.  Neck: Trachea midline, no masses, no thyromegaly. No cervical or supraclavicular lymphadenopathy.  Respiratory: Unlabored respiratory effort, lungs clear to auscultation, no wheezes, no ronchi.  Cardiovascular: Normal S1, S2, no murmur, no edema  Abdomen: Soft,  non-tender, no masses, no hepatosplenomegaly.        Assessment and Plan:   The following treatment plan was discussed    All recent labs and provider notes reviewed    1. Obesity (BMI 30-39.9)    We specifically discussed a comprehensive lifestyle intervention which includes combination of diet exercise and behavioral modification.  We discussed self-monitoring of weight, food intake and exercise logging.  We discussed that people with obesity have learned maladaptive patterns of eating and exercise that are contributing to their weight gain or maintenance of their overweight state.  The second is that behaviors can be modified and that weight loss will result if the patient is persisting.    - Referral to Nutrition Services    2. Annual LABS    - CBC WITH DIFFERENTIAL; Future  - Comp Metabolic Panel; Future  - Lipid Profile; Future  - TSH+FREE T4  - T3 FREE; Future    3. Pure hypercholesterolemia  - CBC WITH DIFFERENTIAL; Future  - Comp Metabolic Panel; Future  - Lipid Profile; Future  - TSH+FREE T4  - T3 FREE; Future    Other orders  - Multiple Vitamins-Minerals (MULTIVITAMIN MEN PO); Take  by mouth.             Instructed to Follow up in clinic or ER for worsening symptoms, difficulty breathing, lack of expected recovery, or should new symptoms or problems arise.    Followup: Return in about 3 months (around 11/2/2024) for Reevaluation, labs.

## 2024-08-10 ENCOUNTER — PATIENT MESSAGE (OUTPATIENT)
Dept: MEDICAL GROUP | Age: 29
End: 2024-08-10
Payer: MEDICARE

## 2024-08-12 ENCOUNTER — OFFICE VISIT (OUTPATIENT)
Dept: NEUROLOGY | Facility: MEDICAL CENTER | Age: 29
End: 2024-08-12
Attending: STUDENT IN AN ORGANIZED HEALTH CARE EDUCATION/TRAINING PROGRAM
Payer: MEDICARE

## 2024-08-12 VITALS
WEIGHT: 233.91 LBS | SYSTOLIC BLOOD PRESSURE: 120 MMHG | HEIGHT: 69 IN | HEART RATE: 60 BPM | TEMPERATURE: 97.9 F | DIASTOLIC BLOOD PRESSURE: 80 MMHG | OXYGEN SATURATION: 94 % | BODY MASS INDEX: 34.64 KG/M2

## 2024-08-12 DIAGNOSIS — R56.9 SEIZURE (HCC): ICD-10-CM

## 2024-08-12 DIAGNOSIS — F41.9 ANXIETY DISORDER, UNSPECIFIED TYPE: ICD-10-CM

## 2024-08-12 DIAGNOSIS — Z91.148 VARIABLE COMPLIANCE WITH MEDICATION THERAPY: ICD-10-CM

## 2024-08-12 DIAGNOSIS — G40.011 PARTIAL IDIOPATHIC EPILEPSY WITH SEIZURES OF LOCALIZED ONSET, INTRACTABLE, WITH STATUS EPILEPTICUS (HCC): ICD-10-CM

## 2024-08-12 DIAGNOSIS — F84.0 AUTISM SPECTRUM DISORDER: ICD-10-CM

## 2024-08-12 DIAGNOSIS — G47.33 OBSTRUCTIVE SLEEP APNEA: ICD-10-CM

## 2024-08-12 DIAGNOSIS — G40.919 BREAKTHROUGH SEIZURE (HCC): ICD-10-CM

## 2024-08-12 DIAGNOSIS — E66.9 OBESITY (BMI 30-39.9): ICD-10-CM

## 2024-08-12 PROCEDURE — 3074F SYST BP LT 130 MM HG: CPT | Performed by: STUDENT IN AN ORGANIZED HEALTH CARE EDUCATION/TRAINING PROGRAM

## 2024-08-12 PROCEDURE — 3079F DIAST BP 80-89 MM HG: CPT | Performed by: STUDENT IN AN ORGANIZED HEALTH CARE EDUCATION/TRAINING PROGRAM

## 2024-08-12 PROCEDURE — G2212 PROLONG OUTPT/OFFICE VIS: HCPCS | Performed by: STUDENT IN AN ORGANIZED HEALTH CARE EDUCATION/TRAINING PROGRAM

## 2024-08-12 PROCEDURE — 99215 OFFICE O/P EST HI 40 MIN: CPT | Performed by: STUDENT IN AN ORGANIZED HEALTH CARE EDUCATION/TRAINING PROGRAM

## 2024-08-12 PROCEDURE — 99212 OFFICE O/P EST SF 10 MIN: CPT | Performed by: STUDENT IN AN ORGANIZED HEALTH CARE EDUCATION/TRAINING PROGRAM

## 2024-08-12 RX ORDER — CLONAZEPAM 1 MG/1
1 TABLET ORAL NIGHTLY
Qty: 90 TABLET | Refills: 1 | Status: SHIPPED | OUTPATIENT
Start: 2024-08-12 | End: 2025-02-08

## 2024-08-12 RX ORDER — BRIVARACETAM 50 MG/1
50 TABLET, FILM COATED ORAL 2 TIMES DAILY
Qty: 180 TABLET | Refills: 1 | Status: SHIPPED | OUTPATIENT
Start: 2024-08-12 | End: 2025-02-08

## 2024-08-12 RX ORDER — LAMOTRIGINE 200 MG/1
1 TABLET, EXTENDED RELEASE ORAL EVERY MORNING
Qty: 90 TABLET | Refills: 3 | Status: SHIPPED | OUTPATIENT
Start: 2024-08-12 | End: 2025-08-07

## 2024-08-12 ASSESSMENT — PATIENT HEALTH QUESTIONNAIRE - PHQ9: CLINICAL INTERPRETATION OF PHQ2 SCORE: 0

## 2024-08-12 ASSESSMENT — FIBROSIS 4 INDEX: FIB4 SCORE: 0.64

## 2024-08-12 NOTE — PROGRESS NOTES
"NEUROLOGY NEW PATIENT ENCOUNTER - 08/12/2024   REFERRING PROVIDER:  Rajat Burciaga M.D.  [address] [place] [address] [place] [id number]  Megan Burciaga   REASON FOR VISIT: Braydon Sotelo 29 y.o. male presents today to establish care with me.    SUMMARY OF PROBLEMS AND/OR DIAGNOSES:  This patient has been referred to me for evaluation / management of Epilepsy. He was seen in ED at [facility] by Dr. Ledezma on May 25 2024 whose note recounts relevant history:    \"28 y.o. man with epilepsy presenting with seizures for whom neurology has been consulted for management.  Some history obtained from the patient.  Additional history from the EMR and care team. Unable to reach patient contact for observer history.      Patient reports that he has had seizures in the past and was diagnosed with epilepsy in 2013.  He does not recall prior medications reports that he currently takes Briviact and lamotrigine and clonazepam.  He does endorse missed dosing of his antiseizure medications recently.  Does not know the dosing of his antiseizure medications.  No known illness or recent sick contacts.     Patient is amnestic to recent seizure activity leading to hospital admission.  His next memory was at [facility] emergency department.     No seizure recurrence this admission.  Patient feels a lot better today however has a sore tongue on the right side.  No new neurologic symptoms.     Patient tells me he does not have a current neurologist with whom he follows.  Last neurology visit in our system appears to be July 2022.  Patient reportedly has 1 seizure type described as generalized tonic-clonic seizures without aura.  Seizures seemingly well-controlled. At that time was on lamictal ER 200mg in am, Keppra ER 1500mg QHS, clonazepam 1mg QHS. Ativan 1mg PRN for breakthrough seizure. At some point was switched to Briviact. Dosing somewhat unclear.     Breakthrough seizure in the setting of missed antiseizure medication dosing as reported by the " "patient.  No other clear trigger or precipitant identified at this time.  Reportedly taking Lamictal 200 mg sustained-release daily, Briviact 50 mg twice daily and Klonopin 1 mg at bedtime. EEG completed; report is pending.  Prior MRI brain from January 2024 and 2013 were non-lesional.  Clinically the patient seems to be feeling much better today and there have been no reported interval seizures.  No further workup is necessary at this time from a neurologic perspective unless the patient has additional seizure activity.  \"    rEEG done at the time in May showed:   Abnormal video EEG recording in the awake, drowsy, and sleep state(s):  - Mild continuous generalized slowing of the background with excess beta activity diffusely. This finding is suggestive of mild cerebral dysfunction of a nonspecific etiology, with medication effects also present.  - No regional slowing or persistent focal asymmetries were seen.  - No epileptiform discharges or other epileptiform phenomena seen.  - No seizures. Clinical correlation is recommended.      Labs reviewed.    ------------------------------------    Patient is accompanied by his father who assists in providing the history. I have also reviewed notes from his prior neurologists Dr. Cooper.     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 He was given rescue Nayzilam which appears to have aborted seizure.    Patient occasionally does miss his doses. His sleep is sometimes erratic as he plays a lot of video games. He does have sleep apnea.    He is seeing a psychiatrist whom he likes for mood disorder and emotional outbursts. He is on citalopram    Current AEDS  Clonazepam 1 mg QHS  Lamictal  mg QD  Briviact 50 mg BID    RELEVANT DATA PERSONALLY REVIEWED:     EEGS  Nov 2022 24 hour ambulatory EEG      IMAGING  Jan 2024 MRI Brain wo (done at [facility]) - " "unremarkable.   Nov 2013 MRI Brain  - [facility]    LABS      OTHER DIAGNOSTICS IF APPLICABLE      Patient's PMH, PSH, FH, SH, allergies, and medications were reviewed:   has a past medical history of Autism, Autism spectrum disorder, and Seizure (HCC).    has a past surgical history that includes tonsillectomy and adenoidectomy; other (1/10/2014); and tonsillectomy.   family history includes Diabetes in his maternal grandfather; Hyperlipidemia in his paternal grandfather; Hypertension in his father; Lung Disease in his paternal aunt and paternal grandmother; No Known Problems in his maternal grandmother and mother; Obesity in his father; Sleep Apnea in his father.    reports that he has never smoked. He has never used smokeless tobacco. He reports that he does not drink alcohol and does not use drugs.       ROS negative except that which was mentioned above.    CURRENT MEDICATIONS AT THE TIME OF THIS ENCOUNTER:    Current Outpatient Medications:     clonazePAM, 1 mg, Oral, Nightly    Briviact, 50 mg, Oral, BID    LamoTRIgine, 1 Tablet, Oral, QAM    Midazolam, 1 Each, Nasal, PRN    Multiple Vitamins-Minerals (MULTIVITAMIN MEN PO), Take  by mouth., Taking    citalopram, 20 mg, Oral, QHS, Taking    Cyanocobalamin (B-12 PO), 1 Tablet, Oral, DAILY, Taking    gemfibrozil, 600 mg, Oral, BID, Taking    ibuprofen, 400 mg, Oral, Once, Taking    vitamin D2 (Ergocalciferol), Take 1 capsule by mouth once a week, Taking     EXAM:   Ambulatory Vitals:  /80 (BP Location: Right arm, Patient Position: Sitting, BP Cuff Size: Adult)   Pulse 60   Temp 36.6 °C (97.9 °F) (Temporal)   Ht 1.753 m (5' 9\")   Wt 106 kg (233 lb 14.5 oz)   SpO2 94%    Physical Exam:  Physical Exam  Vitals reviewed.   Constitutional:       General: He is not in acute distress.  HENT:      Head: Normocephalic and atraumatic.      Nose: Nose normal. No congestion.      Mouth/Throat:      Mouth: Mucous membranes are moist.      Pharynx: Oropharynx is clear. " No oropharyngeal exudate or posterior oropharyngeal erythema.   Eyes:      Extraocular Movements: EOM normal. No nystagmus.   Cardiovascular:      Rate and Rhythm: Normal rate and regular rhythm.      Pulses: Normal pulses.      Heart sounds: Normal heart sounds. No murmur heard.  Pulmonary:      Effort: Pulmonary effort is normal.      Breath sounds: Normal breath sounds.   Abdominal:      General: There is no distension.   Musculoskeletal:         General: No swelling, tenderness, deformity or signs of injury.      Cervical back: Normal range of motion and neck supple.   Skin:     General: Skin is warm and dry.      Coloration: Skin is not jaundiced.      Findings: No erythema or rash.   Neurological:      Mental Status: He is alert.      Motor: Motor strength is normal.     Coordination: Coordination is intact.   Psychiatric:         Speech: Speech normal.        Neurological Exam   Neurological Exam  Mental Status  Alert. Recent and remote memory are intact. Speech is normal. Follows two-step commands. Attention and concentration are normal. Fund of knowledge is appropriate for level of education.  Some trouble following multistep commands.    Cranial Nerves  CN II: Right funduscopic exam: not visualized. Left funduscopic exam: not visualized.  CN III, IV, VI: Extraocular movements intact bilaterally. No nystagmus. Normal saccades. Normal smooth pursuit.   Right pupil: 3 mm. Round. Reactive to light. Reactive to accommodation.   Left pupil: 3 mm. Round. Reactive to light. Reactive to accommodation.  Relative afferent pupillary defect absent.  CN V: Facial sensation is normal.  CN VII: Full and symmetric facial movement.  CN IX, X: Palate elevates symmetrically  CN XI: Shoulder shrug strength is normal.  CN XII: Tongue midline without atrophy or fasciculations.    Motor  Normal muscle bulk throughout. No fasciculations present. Normal muscle tone. No abnormal involuntary movements. Strength is 5/5 throughout all  four extremities.    Sensory  Light touch is normal in upper and lower extremities.     Reflexes  Deep tendon reflexes are 2+ and symmetric except as noted.    Coordination    Finger-to-nose, rapid alternating movements and heel-to-shin normal bilaterally without dysmetria.    Gait  Casual gait is normal including stance, stride, and arm swing.       ASSESSMENT, EDUCATION, COUNSELING:  This is a 29 y.o. male patient who presents to the neurology clinic. We had an extensive discussion about the patient's symptoms, signs, and work-up to date, if any. We discussed potential and/or definitive diagnoses, work-up, and potential treatments. \    Visit Diagnoses     ICD-10-CM   1. Partial idiopathic epilepsy with seizures of localized onset, intractable, with status epilepticus (HCC)  G40.011   2. Breakthrough seizure (HCC)  G40.919   3. Autism spectrum disorder  F84.0   4. Obstructive sleep apnea  G47.33   5. Seizure (HCC)  R56.9   6. Obesity (BMI 30-39.9)  E66.9   7. Anxiety disorder, unspecified type  F41.9   8. Variable compliance with medication therapy  Z91.148      Problem List Items Addressed This Visit          Neurology Medicine Problems    Anxiety disorder    Epilepsy (HCC)    Relevant Medications    clonazePAM (KLONOPIN) 1 MG Tab    BRIVIACT 50 MG Tab tablet    LamoTRIgine 200 MG TABLET SR 24 HR    Midazolam 5 MG/0.1ML Solution    Other Relevant Orders    Referral to Neurodiagnostics (EEG,EP,EMG/NCS/DBS)    Miscellaneous Test    LAMOTRIGINE       Other    Autism spectrum disorder    Obesity (BMI 30-39.9)    Seizure (HCC)    Relevant Medications    clonazePAM (KLONOPIN) 1 MG Tab    BRIVIACT 50 MG Tab tablet    LamoTRIgine 200 MG TABLET SR 24 HR    Midazolam 5 MG/0.1ML Solution    Other Relevant Orders    Miscellaneous Test    LAMOTRIGINE     Other Visit Diagnoses       Breakthrough seizure (HCC)        Relevant Medications    clonazePAM (KLONOPIN) 1 MG Tab    BRIVIACT 50 MG Tab tablet    LamoTRIgine 200 MG TABLET  SR 24 HR    Midazolam 5 MG/0.1ML Solution    Other Relevant Orders    Referral to Neurodiagnostics (EEG,EP,EMG/NCS/DBS)    Obstructive sleep apnea        Variable compliance with medication therapy                 PLAN:  If applicable, the work-up such as labs, imaging, procedures, and/or other testing, referrals, and/or recommended treatment strategies are listed below.  Orders Placed This Encounter    Miscellaneous Test    LAMOTRIGINE    Referral to Neurodiagnostics (EEG,EP,EMG/NCS/DBS)    clonazePAM (KLONOPIN) 1 MG Tab    BRIVIACT 50 MG Tab tablet    LamoTRIgine 200 MG TABLET SR 24 HR    Midazolam 5 MG/0.1ML Solution     Lab Frequency Next Occurrence         Medication List            Accurate as of August 12, 2024  3:44 PM. If you have any questions, ask your nurse or doctor.                START taking these medications        Instructions   Midazolam 5 MG/0.1ML Soln  Started by: Dr. Ron Farr   Doctor's comments: Request 4 spray bottles of 5 mg to cover 30 days.  Administer 1 Each into affected nostril(S) as needed (Use for any tonic clonic seizure lasting > 1 minute) for up to 30 days.  Dose: 1 Each            CONTINUE taking these medications        Instructions   B-12 PO   Take 1 Tablet by mouth every day.  Dose: 1 Tablet     Briviact 50 MG Tabs tablet  Generic drug: brivaracetam   Doctor's comments: Request 180 tabs of 50 mg to cover 90 days plus 1 refills for a total coverage of 180 days.  Take 1 Tablet by mouth 2 times a day for 180 days.  Dose: 50 mg     citalopram 20 MG Tabs  Commonly known as: CeleXA   Take 1 Tablet by mouth at bedtime.  Dose: 20 mg     clonazePAM 1 MG Tabs  Commonly known as: KlonoPIN   Doctor's comments: Request 90 tabs of 1 mg to cover 90 days plus 1 refill for a total of 180 days  Take 1 Tablet by mouth every evening for 180 days.  Dose: 1 mg     gemfibrozil 600 MG Tabs  Commonly known as: Lopid   Take 1 Tablet by mouth 2 times a day.  Dose: 600 mg     ibuprofen 200 MG  Tabs  Commonly known as: Motrin   Take 400 mg by mouth one time.  Dose: 400 mg     LamoTRIgine 200 MG Tb24   Take 1 Tablet by mouth every morning for 360 days.  Dose: 1 Tablet     MULTIVITAMIN MEN PO   Take  by mouth.     vitamin D2 (Ergocalciferol) 1.25 MG (67876 UT) Caps capsule  Commonly known as: Drisdol   Take 1 capsule by mouth once a week               Patient with focal epilepsy with recent breakthrough seizure of unclear cause. Getting updated 72 hour ambulatory EEG and antiseizure drug levels. Counseled patient on the importantce of medication adherence and never missing a dose of medication. Also counseled patient on adherence to CPAP for sleep apnea which can exacerbate an underlying seizure disorder. Recommend regular exercise and dieting as a means for weight loss first but if that fails I see no contraindications for use of weight loss medication such as GLP-1 agonists or similar medications. Would avoid stimulants however. Will consider EMU admission if needed. He should continue allk antiseizure medications unchanged for now. I am also refilling rescue Nayzilam to use as needed    BILLING DOCUMENTATION:     I spent a total of  I spent a total of 60 minutes on the day of the visit.   minutes of face-to-face time in this visit. Over 50% of the time of the visit today was spent on counseling and/or coordination of care wtih the patient and/or family, as above in assessment in plan.    Ron Farr MD  Epilepsy and General Neurology  Department of Neurology  Clinical  of Neurology [facility].

## 2024-08-12 NOTE — PATIENT INSTRUCTIONS
NEUROLOGY CLINIC VISIT WITH DR. FARR     PLEASE READ THIS ENTIRE DOCUMENT CAREFULLY AND COMPLETELY:    First and foremost, you matter to Dr. Farr and you deserve the best care.   Dr. Farr prides himself on providing the best possible care to all his patients. He strives to make each appointment meaningful, so that all your concerns are being addressed and all your neurological problems are being optimally treated. In order to achieve these goals for everyone, Dr. Farr has listed important reminders and the best ways to prepare for each appointment. Please read each item carefully. Thank you!    Due to the high volume of patients we are trying to help, your physician will not be able to respond by phone or in [employer] to your routine concerns between appointments.  This does not reflect a lack of interest or concern for you or your diagnosis.  Please bring these questions and concerns to your appointment where your physician can answer.  Please relay more pressing concerns to our office, either via [employer], or by phone; if not able to reach us please visit nearby Urgent Care Center or Emergency Department.  If any emergent medical needs, please seek emergent medical help and/or call 911.    Also, please note that we are not able to fill out paperwork that might be related to your work, utility company, disability, and/or driving, among others, in between the visits.  Please schedule a dedicated appointment to address any and all paperwork.  This is not due to lack of concern or interest for your disease-related work/administrative problems, but to make sure that we provide the best possible care and to fill out your paperwork in a correct, complete, and timely manner.  ------------------------------------------------------------------------------------------  Please let our office know if you have any changes in your seizure frequency and/characteristics.     Please keep a diary of your seizures and bring it  with you to each appointment.    Please take vitamin D3 2105-7072 internation units daily.     Please abstain from driving until further notice    If you are a biological female with epilepsy who is of reproductive age, who is actively breastfeeding, and/or who infants/young children:  Please take folic acid 1 mg daily. This is an over-the-counter supplement that is recommended to prevent certain developmental problems in your baby, in case you become pregnant in the future.  It is critical that you let our office know as soon as you become pregnant or plan to become pregnant.  If you are caring for a baby/young child, please make sure to be sitting on a soft surface while holding your baby/young child, so in case you have a seizure, your baby/young child is not injured due to fall.   Please let us know if, while breastfeeding, you observe that your baby is excessively sleepy and/or has other behavioral changes. Because many antiseizure medications are collected in breast milk, some nursing babies can suffer adverse medication effects.    Please note that the following might precipitate seizures:   missed doses of antiseizure medications  being sick with a fever, stress  Fatigue  sleep deprivation or abnormal sleeping patterns  not eating regularly  not drinking enough water  drinking too much alcohol  stopping alcohol suddenly if you are currently using it on a regular/daily basis,   using recreational drugs, among others.    Please note that the following might lead to an injury or even be life-threatening in the event you have a seizure and/or lose awareness while:  being in a large body of water by yourself, such as bath, pool, lake, ocean, among others (risk of drowning)  being on unprotected heights (risk of fall)  being around and/or operating heavy machinery (risk of injury)  being around open fire/hot surfaces (risk of burns)  any other activities/circumstances, in which if you lose awareness, you might  injure yourself and/or others.  -------------------------------------------------------------------------------------------  SUDEP (SUDDEN UNEXPECTED DEATH IN EPILEPSY)  It is important that your seizures are well controlled and you have none or have them rarely. In addition to avoiding injury related to breakthrough seizures, frequent seizures increase risk of SUDEP (sudden unexpected death in epilepsy), where a person goes into a seizure and then never wakes up. The best way to prevent SUDEP is to control your seizures well.   ------------------------------------------------------------------------------------------  Please call for help (crisis line and/or 911) in case you have thoughts of harming yourself and/or others.  ------------------------------------------------------------------------------------------  INSTRUCTIONS FOR YOUR FAMILY/CAREGIVERS:  Please call 911 if the patient has a seizure longer than 2-3 minutes, if seizures are back to back without her recovering to her baseline, or she does not start recovering within 5-10 minutes after the seizure stops. During the seizure - please turn her on her side, please make sure her head is protected (for example, you should put a pillow under her head, if one is available), and please do not put anything in her mouth.   ------------------------------------------------------------------------------------------  PATIENT EXPECTATIONS,  IMPORTANT APPOINTMENT REMINDERS, AND ADDITIONAL HELPFUL TIPS:   REFILLS:   Request refills AT LEAST 1 week in advance to ensure you do not run out of medications    MyChart  It is STRONGLY encouraged that ALL patients sign up for MyChart. It is BY FAR the fastest and most convenient way for both Dr. Farr and patients to obtain timely refills.  If you are having trouble signing up or logging into your account, staff are available to help you. Please ask a medical assistant or staff at the  to assist you.    TEST RESULS:    All labs and diagnostic test results will be reviewed at your next visit, UNLESS  Dr. Farr determines that there are important findings on the tests need to be acted on sooner. Dr. Farr will either call or send a message through [employer] if this is the case.    BE PREPARED PRIOR TO EVERY APPOINTMENT:  All patient are responsible for ensuring that ALL test results that were completed outside of the [facility] system have been received by our Neurology Department PRIOR to your appointment with Dr. Farr.    IMPORTANT:  ALL images (not just the reports) must be sent and uploaded to the [facility] system. Dr. Farr reviews all images personally prior to each visit. Ensuring that ALL the test results and test images are accessible to Dr. Farr prior to your appointment is YOUR responsibility and an important part of making the most out of each appointment.   Bring a government-issues picture ID and an updated insurance card EVERY visit.  It is highly recommended that you bring at every visit a list of the most important topics that you want address. While it may not be possible to address all items on the list in a single visit, preparing a list will ensure that Dr. Farr addresses the items that are most important to you and your health    PAPERWORK, DOCUMENTATION, LETTER REQUESTS:  You must notify the office ahead of your appointment of all paperwork or letter requests.   Please DO NOT wait until the last minute to make these requests. Please give all paperwork to the medical assistant at the start of the appointment and check-in process. Please note that Dr. Farr may not be able complete some types of documentation in a single appointment or even within a single day or week. This is why it is important to communicate paperwork requests prior to your appointment and at least 2 weeks prior to any deadlines.    KNOW ALL YOU MEDICATIONS:   AT EVERY SINGLE APPOINTMENT, please bring a list of every single prescribed,  non-prescribed, and over the counter medication or supplements you are taking, including ones taken on a rare or intermittent basis.  Include the following information for each prescribed or non-prescribed medications:  Name of medication   The strength of EACH pill/capsule/tablet, etc.   The number of pills/capsules/tablets, etc taken per dose  The number and time of day that doses are taken  For every single Supplement that you take on a routine or intermittent basis, you must include:  The Brand Name   A complete list of every single ingredient, compound, vitamin, and/or mineral in each dose, along with the corresponding amounts/strengths of all ingredients, vitamins, minerals, etc., if such information is provided or known  The number of doses taken per day and time of day doses are taken  If medications are taken on an intermittent or as needed basis, please estimate how many days per week or days per month the medications are used  DO NOT just print out your medication list from [employer] or bring a list from a prior appointment or hospitalizations because the information is often often unreliable, inaccurate, outdated, and/or incomplete   The list should be printed or written  If you forget or do not have a list of all the medication, then it is acceptable, although less preferred, to bring all the bottles to the appointment     ARRIVE EARLY FOR ALL VISITS:  Please note that we are unable to accommodate late arrivals as per office policy.  YOU-the patient - (NOT a parent, spouse, or friend) must be physically present at check-in no later than 12 minutes after the scheduled appointment time, or you will be asked to reschedule   Consider scheduling a virtual appointment with Dr. Farr through [employer] as an alternative if transportation to the clinic is difficult or unavailable   Please note, however, that virtual visits can only be scheduled after being an established patient of Dr. Farr. All new appointments  "must be done in-person in clinic  Some insurances will not cover the cost of virtual appointments. Please check with your insurance to find out if these visits are covered    COMMUNICATING URGENT AND NON-URGENT MATTERS:  Your concerns are important and deserve to be heard and addressed. If you have an urgent matter, there are two methods that will ensure your concerns are prioritized appropriately:   Preferred method: Sign-up/Login to your [employer] account and send a message addressed to Dr. Farr or Sonam Harmon (Dr. Farr's assistant). In the subject line, type \"urgent\" followed by a word or phrase describing the situation (For example, write \"Urgent: Out of antiseuzre med and need refill\" or \"Urgent: Severe side effects to new meds\". In doing this, our staff can ensure urgent messages are triaged appropriately and communicated to Dr. Farr that day.  Call [facility] main line at [phone]. Dr. Farr's voicemail extension is [phone]. When leaving a voice message, specifically indicate if it is urgent (or non-urgent) so that the matter can be triaged appropriately and addressed in a timely manner    Thank you for entrusting your neurological care to [facility] and we look forward to continuing to serve you.   "

## 2024-08-13 RX ORDER — LAMOTRIGINE 200 MG/1
1 TABLET, EXTENDED RELEASE ORAL EVERY MORNING
Qty: 90 TABLET | Refills: 2 | Status: SHIPPED | OUTPATIENT
Start: 2024-08-13

## 2024-09-07 ENCOUNTER — HOSPITAL ENCOUNTER (OUTPATIENT)
Dept: LAB | Facility: MEDICAL CENTER | Age: 29
End: 2024-09-07
Attending: FAMILY MEDICINE
Payer: MEDICARE

## 2024-09-07 DIAGNOSIS — Z00.00 ANNUAL PHYSICAL EXAM: ICD-10-CM

## 2024-09-07 DIAGNOSIS — E78.00 PURE HYPERCHOLESTEROLEMIA: ICD-10-CM

## 2024-09-07 LAB
ALBUMIN SERPL BCP-MCNC: 4.5 G/DL (ref 3.2–4.9)
ALBUMIN/GLOB SERPL: 2 G/DL
ALP SERPL-CCNC: 76 U/L (ref 30–99)
ALT SERPL-CCNC: 25 U/L (ref 2–50)
ANION GAP SERPL CALC-SCNC: 10 MMOL/L (ref 7–16)
AST SERPL-CCNC: 13 U/L (ref 12–45)
BASOPHILS # BLD AUTO: 0.6 % (ref 0–1.8)
BASOPHILS # BLD: 0.05 K/UL (ref 0–0.12)
BILIRUB SERPL-MCNC: 0.2 MG/DL (ref 0.1–1.5)
BUN SERPL-MCNC: 15 MG/DL (ref 8–22)
CALCIUM ALBUM COR SERPL-MCNC: 8.7 MG/DL (ref 8.5–10.5)
CALCIUM SERPL-MCNC: 9.1 MG/DL (ref 8.5–10.5)
CHLORIDE SERPL-SCNC: 104 MMOL/L (ref 96–112)
CHOLEST SERPL-MCNC: 207 MG/DL (ref 100–199)
CO2 SERPL-SCNC: 26 MMOL/L (ref 20–33)
CREAT SERPL-MCNC: 1.11 MG/DL (ref 0.5–1.4)
EOSINOPHIL # BLD AUTO: 0.26 K/UL (ref 0–0.51)
EOSINOPHIL NFR BLD: 3 % (ref 0–6.9)
ERYTHROCYTE [DISTWIDTH] IN BLOOD BY AUTOMATED COUNT: 38.7 FL (ref 35.9–50)
GFR SERPLBLD CREATININE-BSD FMLA CKD-EPI: 93 ML/MIN/1.73 M 2
GLOBULIN SER CALC-MCNC: 2.2 G/DL (ref 1.9–3.5)
GLUCOSE SERPL-MCNC: 90 MG/DL (ref 65–99)
HCT VFR BLD AUTO: 49.4 % (ref 42–52)
HDLC SERPL-MCNC: 34 MG/DL
HGB BLD-MCNC: 16.5 G/DL (ref 14–18)
IMM GRANULOCYTES # BLD AUTO: 0.03 K/UL (ref 0–0.11)
IMM GRANULOCYTES NFR BLD AUTO: 0.3 % (ref 0–0.9)
LDLC SERPL CALC-MCNC: 123 MG/DL
LYMPHOCYTES # BLD AUTO: 2.73 K/UL (ref 1–4.8)
LYMPHOCYTES NFR BLD: 31.8 % (ref 22–41)
MCH RBC QN AUTO: 30.1 PG (ref 27–33)
MCHC RBC AUTO-ENTMCNC: 33.4 G/DL (ref 32.3–36.5)
MCV RBC AUTO: 90.1 FL (ref 81.4–97.8)
MONOCYTES # BLD AUTO: 0.59 K/UL (ref 0–0.85)
MONOCYTES NFR BLD AUTO: 6.9 % (ref 0–13.4)
NEUTROPHILS # BLD AUTO: 4.93 K/UL (ref 1.82–7.42)
NEUTROPHILS NFR BLD: 57.4 % (ref 44–72)
NRBC # BLD AUTO: 0 K/UL
NRBC BLD-RTO: 0 /100 WBC (ref 0–0.2)
PLATELET # BLD AUTO: 241 K/UL (ref 164–446)
PMV BLD AUTO: 10.4 FL (ref 9–12.9)
POTASSIUM SERPL-SCNC: 4.6 MMOL/L (ref 3.6–5.5)
PROT SERPL-MCNC: 6.7 G/DL (ref 6–8.2)
RBC # BLD AUTO: 5.48 M/UL (ref 4.7–6.1)
SODIUM SERPL-SCNC: 140 MMOL/L (ref 135–145)
T3FREE SERPL-MCNC: 3.21 PG/ML (ref 2–4.4)
T4 FREE SERPL-MCNC: 1.06 NG/DL (ref 0.93–1.7)
TRIGL SERPL-MCNC: 251 MG/DL (ref 0–149)
TSH SERPL-ACNC: 3.22 UIU/ML (ref 0.35–5.5)
WBC # BLD AUTO: 8.6 K/UL (ref 4.8–10.8)

## 2024-09-07 PROCEDURE — 84443 ASSAY THYROID STIM HORMONE: CPT

## 2024-09-07 PROCEDURE — 84439 ASSAY OF FREE THYROXINE: CPT

## 2024-09-07 PROCEDURE — 84481 FREE ASSAY (FT-3): CPT

## 2024-09-07 PROCEDURE — 85025 COMPLETE CBC W/AUTO DIFF WBC: CPT

## 2024-09-07 PROCEDURE — 80053 COMPREHEN METABOLIC PANEL: CPT

## 2024-09-07 PROCEDURE — 80061 LIPID PANEL: CPT

## 2024-09-07 PROCEDURE — 36415 COLL VENOUS BLD VENIPUNCTURE: CPT

## 2024-09-30 ENCOUNTER — NON-PROVIDER VISIT (OUTPATIENT)
Dept: NEUROLOGY | Facility: MEDICAL CENTER | Age: 29
End: 2024-09-30
Attending: STUDENT IN AN ORGANIZED HEALTH CARE EDUCATION/TRAINING PROGRAM
Payer: MEDICARE

## 2024-09-30 DIAGNOSIS — G40.011 PARTIAL IDIOPATHIC EPILEPSY WITH SEIZURES OF LOCALIZED ONSET, INTRACTABLE, WITH STATUS EPILEPTICUS (HCC): ICD-10-CM

## 2024-09-30 DIAGNOSIS — G40.919 BREAKTHROUGH SEIZURE (HCC): ICD-10-CM

## 2024-09-30 PROCEDURE — 95719 EEG PHYS/QHP EA INCR W/O VID: CPT | Performed by: STUDENT IN AN ORGANIZED HEALTH CARE EDUCATION/TRAINING PROGRAM

## 2024-09-30 PROCEDURE — 95700 EEG CONT REC W/VID EEG TECH: CPT | Performed by: STUDENT IN AN ORGANIZED HEALTH CARE EDUCATION/TRAINING PROGRAM

## 2024-09-30 PROCEDURE — 95708 EEG WO VID EA 12-26HR UNMNTR: CPT | Performed by: STUDENT IN AN ORGANIZED HEALTH CARE EDUCATION/TRAINING PROGRAM

## 2024-10-01 ENCOUNTER — NON-PROVIDER VISIT (OUTPATIENT)
Dept: NEUROLOGY | Facility: MEDICAL CENTER | Age: 29
End: 2024-10-01
Attending: STUDENT IN AN ORGANIZED HEALTH CARE EDUCATION/TRAINING PROGRAM
Payer: MEDICARE

## 2024-10-01 DIAGNOSIS — G40.309 GENERALIZED CONVULSIVE EPILEPSY (HCC): ICD-10-CM

## 2024-10-01 DIAGNOSIS — G40.011 PARTIAL IDIOPATHIC EPILEPSY WITH SEIZURES OF LOCALIZED ONSET, INTRACTABLE, WITH STATUS EPILEPTICUS (HCC): ICD-10-CM

## 2024-10-01 PROCEDURE — 95719 EEG PHYS/QHP EA INCR W/O VID: CPT | Performed by: STUDENT IN AN ORGANIZED HEALTH CARE EDUCATION/TRAINING PROGRAM

## 2024-10-01 PROCEDURE — 95708 EEG WO VID EA 12-26HR UNMNTR: CPT | Performed by: STUDENT IN AN ORGANIZED HEALTH CARE EDUCATION/TRAINING PROGRAM

## 2024-10-02 ENCOUNTER — NON-PROVIDER VISIT (OUTPATIENT)
Dept: NEUROLOGY | Facility: MEDICAL CENTER | Age: 29
End: 2024-10-02
Attending: STUDENT IN AN ORGANIZED HEALTH CARE EDUCATION/TRAINING PROGRAM
Payer: MEDICARE

## 2024-10-02 DIAGNOSIS — G40.919 BREAKTHROUGH SEIZURE (HCC): ICD-10-CM

## 2024-10-02 DIAGNOSIS — G40.011 PARTIAL IDIOPATHIC EPILEPSY WITH SEIZURES OF LOCALIZED ONSET, INTRACTABLE, WITH STATUS EPILEPTICUS (HCC): ICD-10-CM

## 2024-10-02 PROCEDURE — 95708 EEG WO VID EA 12-26HR UNMNTR: CPT | Performed by: STUDENT IN AN ORGANIZED HEALTH CARE EDUCATION/TRAINING PROGRAM

## 2024-10-02 PROCEDURE — 95719 EEG PHYS/QHP EA INCR W/O VID: CPT | Performed by: STUDENT IN AN ORGANIZED HEALTH CARE EDUCATION/TRAINING PROGRAM

## 2024-10-03 ENCOUNTER — NON-PROVIDER VISIT (OUTPATIENT)
Dept: NEUROLOGY | Facility: MEDICAL CENTER | Age: 29
End: 2024-10-03
Attending: STUDENT IN AN ORGANIZED HEALTH CARE EDUCATION/TRAINING PROGRAM
Payer: MEDICARE

## 2024-10-03 PROCEDURE — 99999 PR NO CHARGE: CPT | Performed by: STUDENT IN AN ORGANIZED HEALTH CARE EDUCATION/TRAINING PROGRAM

## 2024-10-05 ENCOUNTER — HOSPITAL ENCOUNTER (EMERGENCY)
Facility: MEDICAL CENTER | Age: 29
End: 2024-10-05
Attending: EMERGENCY MEDICINE
Payer: MEDICARE

## 2024-10-05 VITALS
OXYGEN SATURATION: 90 % | BODY MASS INDEX: 32.18 KG/M2 | WEIGHT: 205 LBS | RESPIRATION RATE: 17 BRPM | HEART RATE: 70 BPM | HEIGHT: 67 IN | DIASTOLIC BLOOD PRESSURE: 62 MMHG | SYSTOLIC BLOOD PRESSURE: 114 MMHG | TEMPERATURE: 97.9 F

## 2024-10-05 DIAGNOSIS — G40.309 GENERALIZED CONVULSIVE EPILEPSY (HCC): ICD-10-CM

## 2024-10-05 DIAGNOSIS — G40.011 PARTIAL IDIOPATHIC EPILEPSY WITH SEIZURES OF LOCALIZED ONSET, INTRACTABLE, WITH STATUS EPILEPTICUS (HCC): ICD-10-CM

## 2024-10-05 DIAGNOSIS — G40.909 SEIZURE DISORDER (HCC): ICD-10-CM

## 2024-10-05 DIAGNOSIS — Z91.148 NON COMPLIANCE W MEDICATION REGIMEN: ICD-10-CM

## 2024-10-05 DIAGNOSIS — R56.9 SEIZURE (HCC): ICD-10-CM

## 2024-10-05 LAB
ALBUMIN SERPL BCP-MCNC: 4.4 G/DL (ref 3.2–4.9)
ALBUMIN/GLOB SERPL: 1.6 G/DL
ALP SERPL-CCNC: 105 U/L (ref 30–99)
ALT SERPL-CCNC: 56 U/L (ref 2–50)
ANION GAP SERPL CALC-SCNC: 20 MMOL/L (ref 7–16)
AST SERPL-CCNC: 47 U/L (ref 12–45)
BASOPHILS # BLD AUTO: 0.5 % (ref 0–1.8)
BASOPHILS # BLD: 0.04 K/UL (ref 0–0.12)
BILIRUB SERPL-MCNC: <0.2 MG/DL (ref 0.1–1.5)
BUN SERPL-MCNC: 23 MG/DL (ref 8–22)
CALCIUM ALBUM COR SERPL-MCNC: 8.8 MG/DL (ref 8.5–10.5)
CALCIUM SERPL-MCNC: 9.1 MG/DL (ref 8.5–10.5)
CHLORIDE SERPL-SCNC: 103 MMOL/L (ref 96–112)
CO2 SERPL-SCNC: 18 MMOL/L (ref 20–33)
CREAT SERPL-MCNC: 1.48 MG/DL (ref 0.5–1.4)
EOSINOPHIL # BLD AUTO: 0.17 K/UL (ref 0–0.51)
EOSINOPHIL NFR BLD: 2.1 % (ref 0–6.9)
ERYTHROCYTE [DISTWIDTH] IN BLOOD BY AUTOMATED COUNT: 38.8 FL (ref 35.9–50)
GFR SERPLBLD CREATININE-BSD FMLA CKD-EPI: 66 ML/MIN/1.73 M 2
GLOBULIN SER CALC-MCNC: 2.7 G/DL (ref 1.9–3.5)
GLUCOSE SERPL-MCNC: 197 MG/DL (ref 65–99)
HCT VFR BLD AUTO: 51.7 % (ref 42–52)
HGB BLD-MCNC: 16.7 G/DL (ref 14–18)
IMM GRANULOCYTES # BLD AUTO: 0.12 K/UL (ref 0–0.11)
IMM GRANULOCYTES NFR BLD AUTO: 1.5 % (ref 0–0.9)
LYMPHOCYTES # BLD AUTO: 2.6 K/UL (ref 1–4.8)
LYMPHOCYTES NFR BLD: 31.8 % (ref 22–41)
MCH RBC QN AUTO: 29.4 PG (ref 27–33)
MCHC RBC AUTO-ENTMCNC: 32.3 G/DL (ref 32.3–36.5)
MCV RBC AUTO: 91 FL (ref 81.4–97.8)
MONOCYTES # BLD AUTO: 0.47 K/UL (ref 0–0.85)
MONOCYTES NFR BLD AUTO: 5.8 % (ref 0–13.4)
NEUTROPHILS # BLD AUTO: 4.77 K/UL (ref 1.82–7.42)
NEUTROPHILS NFR BLD: 58.3 % (ref 44–72)
NRBC # BLD AUTO: 0 K/UL
NRBC BLD-RTO: 0 /100 WBC (ref 0–0.2)
PLATELET # BLD AUTO: 221 K/UL (ref 164–446)
PMV BLD AUTO: 9.8 FL (ref 9–12.9)
POTASSIUM SERPL-SCNC: 4.7 MMOL/L (ref 3.6–5.5)
PROT SERPL-MCNC: 7.1 G/DL (ref 6–8.2)
RBC # BLD AUTO: 5.68 M/UL (ref 4.7–6.1)
SODIUM SERPL-SCNC: 141 MMOL/L (ref 135–145)
WBC # BLD AUTO: 8.2 K/UL (ref 4.8–10.8)

## 2024-10-05 PROCEDURE — 99284 EMERGENCY DEPT VISIT MOD MDM: CPT

## 2024-10-05 PROCEDURE — A9270 NON-COVERED ITEM OR SERVICE: HCPCS | Performed by: EMERGENCY MEDICINE

## 2024-10-05 PROCEDURE — 700105 HCHG RX REV CODE 258: Performed by: EMERGENCY MEDICINE

## 2024-10-05 PROCEDURE — 700102 HCHG RX REV CODE 250 W/ 637 OVERRIDE(OP): Performed by: EMERGENCY MEDICINE

## 2024-10-05 PROCEDURE — 85025 COMPLETE CBC W/AUTO DIFF WBC: CPT

## 2024-10-05 PROCEDURE — 80053 COMPREHEN METABOLIC PANEL: CPT

## 2024-10-05 PROCEDURE — 36415 COLL VENOUS BLD VENIPUNCTURE: CPT

## 2024-10-05 RX ORDER — SODIUM CHLORIDE 9 MG/ML
1000 INJECTION, SOLUTION INTRAVENOUS ONCE
Status: COMPLETED | OUTPATIENT
Start: 2024-10-05 | End: 2024-10-05

## 2024-10-05 RX ADMIN — SODIUM CHLORIDE 1000 ML: 9 INJECTION, SOLUTION INTRAVENOUS at 09:45

## 2024-10-05 RX ADMIN — BRIVARACETAM 50 MG: 50 TABLET, FILM COATED ORAL at 11:50

## 2024-10-05 ASSESSMENT — FIBROSIS 4 INDEX: FIB4 SCORE: 0.3

## 2024-10-07 ENCOUNTER — PATIENT MESSAGE (OUTPATIENT)
Dept: NEUROLOGY | Facility: MEDICAL CENTER | Age: 29
End: 2024-10-07
Payer: MEDICARE

## 2024-10-07 DIAGNOSIS — R56.9 SEIZURE (HCC): ICD-10-CM

## 2024-10-09 RX ORDER — MIDAZOLAM 5 MG/.1ML
5 SPRAY NASAL
Qty: 4 EACH | Refills: 0 | Status: SHIPPED | OUTPATIENT
Start: 2024-10-09 | End: 2024-11-08

## 2024-11-17 DIAGNOSIS — E55.9 VITAMIN D DEFICIENCY: ICD-10-CM

## 2024-11-17 DIAGNOSIS — G40.309 GENERALIZED CONVULSIVE EPILEPSY (HCC): ICD-10-CM

## 2024-11-17 DIAGNOSIS — F41.9 ANXIETY DISORDER, UNSPECIFIED TYPE: ICD-10-CM

## 2024-11-17 DIAGNOSIS — G47.33 OBSTRUCTIVE SLEEP APNEA: ICD-10-CM

## 2024-11-17 DIAGNOSIS — R56.9 SEIZURE (HCC): ICD-10-CM

## 2024-11-18 RX ORDER — ERGOCALCIFEROL 1.25 MG/1
50000 CAPSULE, LIQUID FILLED ORAL
Qty: 5 CAPSULE | Refills: 5 | Status: SHIPPED | OUTPATIENT
Start: 2024-11-18

## 2024-12-13 ENCOUNTER — HOSPITAL ENCOUNTER (OUTPATIENT)
Dept: LAB | Facility: MEDICAL CENTER | Age: 29
End: 2024-12-13
Attending: FAMILY MEDICINE
Payer: MEDICARE

## 2024-12-13 DIAGNOSIS — E78.00 PURE HYPERCHOLESTEROLEMIA: ICD-10-CM

## 2024-12-13 DIAGNOSIS — Z00.00 ANNUAL PHYSICAL EXAM: ICD-10-CM

## 2024-12-13 DIAGNOSIS — E66.9 OBESITY (BMI 30-39.9): ICD-10-CM

## 2024-12-13 LAB
BASOPHILS # BLD AUTO: 0.5 % (ref 0–1.8)
BASOPHILS # BLD: 0.03 K/UL (ref 0–0.12)
EOSINOPHIL # BLD AUTO: 0.17 K/UL (ref 0–0.51)
EOSINOPHIL NFR BLD: 2.8 % (ref 0–6.9)
ERYTHROCYTE [DISTWIDTH] IN BLOOD BY AUTOMATED COUNT: 38.5 FL (ref 35.9–50)
HCT VFR BLD AUTO: 49.8 % (ref 42–52)
HGB BLD-MCNC: 16.4 G/DL (ref 14–18)
IMM GRANULOCYTES # BLD AUTO: 0.02 K/UL (ref 0–0.11)
IMM GRANULOCYTES NFR BLD AUTO: 0.3 % (ref 0–0.9)
LYMPHOCYTES # BLD AUTO: 1.63 K/UL (ref 1–4.8)
LYMPHOCYTES NFR BLD: 26.7 % (ref 22–41)
MCH RBC QN AUTO: 29 PG (ref 27–33)
MCHC RBC AUTO-ENTMCNC: 32.9 G/DL (ref 32.3–36.5)
MCV RBC AUTO: 88.1 FL (ref 81.4–97.8)
MONOCYTES # BLD AUTO: 0.42 K/UL (ref 0–0.85)
MONOCYTES NFR BLD AUTO: 6.9 % (ref 0–13.4)
NEUTROPHILS # BLD AUTO: 3.84 K/UL (ref 1.82–7.42)
NEUTROPHILS NFR BLD: 62.8 % (ref 44–72)
NRBC # BLD AUTO: 0 K/UL
NRBC BLD-RTO: 0 /100 WBC (ref 0–0.2)
PLATELET # BLD AUTO: 239 K/UL (ref 164–446)
PMV BLD AUTO: 10.4 FL (ref 9–12.9)
RBC # BLD AUTO: 5.65 M/UL (ref 4.7–6.1)
WBC # BLD AUTO: 6.1 K/UL (ref 4.8–10.8)

## 2024-12-13 PROCEDURE — 36415 COLL VENOUS BLD VENIPUNCTURE: CPT

## 2024-12-13 PROCEDURE — 80061 LIPID PANEL: CPT

## 2024-12-13 PROCEDURE — 85025 COMPLETE CBC W/AUTO DIFF WBC: CPT

## 2024-12-13 PROCEDURE — 84481 FREE ASSAY (FT-3): CPT

## 2024-12-13 PROCEDURE — 84443 ASSAY THYROID STIM HORMONE: CPT

## 2024-12-13 PROCEDURE — 80053 COMPREHEN METABOLIC PANEL: CPT

## 2024-12-13 PROCEDURE — 84439 ASSAY OF FREE THYROXINE: CPT

## 2024-12-14 LAB
ALBUMIN SERPL BCP-MCNC: 4.6 G/DL (ref 3.2–4.9)
ALBUMIN/GLOB SERPL: 1.8 G/DL
ALP SERPL-CCNC: 75 U/L (ref 30–99)
ALT SERPL-CCNC: 22 U/L (ref 2–50)
ANION GAP SERPL CALC-SCNC: 11 MMOL/L (ref 7–16)
AST SERPL-CCNC: 19 U/L (ref 12–45)
BILIRUB SERPL-MCNC: 0.4 MG/DL (ref 0.1–1.5)
BUN SERPL-MCNC: 17 MG/DL (ref 8–22)
CALCIUM ALBUM COR SERPL-MCNC: 8.5 MG/DL (ref 8.5–10.5)
CALCIUM SERPL-MCNC: 9 MG/DL (ref 8.5–10.5)
CHLORIDE SERPL-SCNC: 104 MMOL/L (ref 96–112)
CHOLEST SERPL-MCNC: 177 MG/DL (ref 100–199)
CO2 SERPL-SCNC: 24 MMOL/L (ref 20–33)
CREAT SERPL-MCNC: 0.96 MG/DL (ref 0.5–1.4)
GFR SERPLBLD CREATININE-BSD FMLA CKD-EPI: 110 ML/MIN/1.73 M 2
GLOBULIN SER CALC-MCNC: 2.5 G/DL (ref 1.9–3.5)
GLUCOSE SERPL-MCNC: 86 MG/DL (ref 65–99)
HDLC SERPL-MCNC: 34 MG/DL
LDLC SERPL CALC-MCNC: 106 MG/DL
POTASSIUM SERPL-SCNC: 4.4 MMOL/L (ref 3.6–5.5)
PROT SERPL-MCNC: 7.1 G/DL (ref 6–8.2)
SODIUM SERPL-SCNC: 139 MMOL/L (ref 135–145)
T3FREE SERPL-MCNC: 3.55 PG/ML (ref 2–4.4)
T4 FREE SERPL-MCNC: 1.05 NG/DL (ref 0.93–1.7)
TRIGL SERPL-MCNC: 187 MG/DL (ref 0–149)
TSH SERPL-ACNC: 1.71 UIU/ML (ref 0.35–5.5)

## 2024-12-20 ENCOUNTER — OFFICE VISIT (OUTPATIENT)
Dept: NEUROLOGY | Facility: MEDICAL CENTER | Age: 29
End: 2024-12-20
Attending: STUDENT IN AN ORGANIZED HEALTH CARE EDUCATION/TRAINING PROGRAM
Payer: MEDICARE

## 2024-12-20 VITALS
HEIGHT: 70 IN | RESPIRATION RATE: 12 BRPM | WEIGHT: 239.64 LBS | BODY MASS INDEX: 34.31 KG/M2 | DIASTOLIC BLOOD PRESSURE: 68 MMHG | OXYGEN SATURATION: 93 % | HEART RATE: 70 BPM | SYSTOLIC BLOOD PRESSURE: 110 MMHG | TEMPERATURE: 98.6 F

## 2024-12-20 DIAGNOSIS — F41.9 ANXIETY DISORDER, UNSPECIFIED TYPE: ICD-10-CM

## 2024-12-20 DIAGNOSIS — G47.33 OSA (OBSTRUCTIVE SLEEP APNEA): ICD-10-CM

## 2024-12-20 DIAGNOSIS — E55.9 VITAMIN D DEFICIENCY: ICD-10-CM

## 2024-12-20 DIAGNOSIS — Z91.148 VARIABLE COMPLIANCE WITH MEDICATION THERAPY: ICD-10-CM

## 2024-12-20 DIAGNOSIS — F39 MILD MOOD DISORDER (HCC): ICD-10-CM

## 2024-12-20 DIAGNOSIS — R56.9 SEIZURE (HCC): ICD-10-CM

## 2024-12-20 DIAGNOSIS — E66.9 OBESITY (BMI 30-39.9): ICD-10-CM

## 2024-12-20 DIAGNOSIS — F84.0 AUTISM SPECTRUM DISORDER: ICD-10-CM

## 2024-12-20 DIAGNOSIS — G40.919 BREAKTHROUGH SEIZURE (HCC): ICD-10-CM

## 2024-12-20 DIAGNOSIS — G40.011 PARTIAL IDIOPATHIC EPILEPSY WITH SEIZURES OF LOCALIZED ONSET, INTRACTABLE, WITH STATUS EPILEPTICUS (HCC): ICD-10-CM

## 2024-12-20 DIAGNOSIS — G40.309 GENERALIZED CONVULSIVE EPILEPSY (HCC): ICD-10-CM

## 2024-12-20 PROCEDURE — 99212 OFFICE O/P EST SF 10 MIN: CPT | Performed by: STUDENT IN AN ORGANIZED HEALTH CARE EDUCATION/TRAINING PROGRAM

## 2024-12-20 PROCEDURE — 3078F DIAST BP <80 MM HG: CPT | Performed by: STUDENT IN AN ORGANIZED HEALTH CARE EDUCATION/TRAINING PROGRAM

## 2024-12-20 PROCEDURE — 99214 OFFICE O/P EST MOD 30 MIN: CPT | Performed by: STUDENT IN AN ORGANIZED HEALTH CARE EDUCATION/TRAINING PROGRAM

## 2024-12-20 PROCEDURE — 3074F SYST BP LT 130 MM HG: CPT | Performed by: STUDENT IN AN ORGANIZED HEALTH CARE EDUCATION/TRAINING PROGRAM

## 2024-12-20 RX ORDER — LAMOTRIGINE 200 MG/1
1 TABLET, EXTENDED RELEASE ORAL EVERY MORNING
Qty: 90 TABLET | Refills: 3 | Status: SHIPPED | OUTPATIENT
Start: 2024-12-20 | End: 2025-12-15

## 2024-12-20 RX ORDER — CLONAZEPAM 1 MG/1
1 TABLET ORAL NIGHTLY
Qty: 90 TABLET | Refills: 1 | Status: SHIPPED | OUTPATIENT
Start: 2024-12-20 | End: 2025-06-18

## 2024-12-20 RX ORDER — BRIVARACETAM 50 MG/1
50 TABLET, FILM COATED ORAL 2 TIMES DAILY
Qty: 60 TABLET | Refills: 5 | Status: SHIPPED | OUTPATIENT
Start: 2024-12-20 | End: 2025-06-18

## 2024-12-20 RX ORDER — MIDAZOLAM 5 MG/.1ML
5 SPRAY NASAL
Qty: 10 EACH | Refills: 0 | Status: SHIPPED | OUTPATIENT
Start: 2024-12-20 | End: 2025-01-19

## 2024-12-20 ASSESSMENT — FIBROSIS 4 INDEX: FIB4 SCORE: 0.47

## 2024-12-20 ASSESSMENT — PATIENT HEALTH QUESTIONNAIRE - PHQ9: CLINICAL INTERPRETATION OF PHQ2 SCORE: 0

## 2024-12-20 NOTE — PATIENT INSTRUCTIONS
NEUROLOGY CLINIC VISIT WITH DR. FARR     PLEASE READ THIS ENTIRE DOCUMENT CAREFULLY AND COMPLETELY:    First and foremost, you matter to Dr. Farr and you deserve the best care.   Dr. Farr prides himself on providing the best possible care to all his patients. He strives to make each appointment meaningful, so that all your concerns are being addressed and all your neurological problems are being optimally treated. In order to achieve these goals for everyone, Dr. Farr has listed important reminders and the best ways to prepare for each appointment. Please read each item carefully. Thank you!    Due to the high volume of patients we are trying to help, your physician will not be able to respond by phone or in [employer] to your routine concerns between appointments.  This does not reflect a lack of interest or concern for you or your diagnosis.  Please bring these questions and concerns to your appointment where your physician can answer.  Please relay more pressing concerns to our office, either via [employer], or by phone; if not able to reach us please visit nearby Urgent Care Center or Emergency Department.  If any emergent medical needs, please seek emergent medical help and/or call 911.    Also, please note that we are not able to fill out paperwork that might be related to your work, utility company, disability, and/or driving, among others, in between the visits.  Please schedule a dedicated appointment to address any and all paperwork.  This is not due to lack of concern or interest for your disease-related work/administrative problems, but to make sure that we provide the best possible care and to fill out your paperwork in a correct, complete, and timely manner.  ------------------------------------------------------------------------------------------  Please let our office know if you have any changes in your seizure frequency and/characteristics.     Please keep a diary of your seizures and bring it  with you to each appointment.    Please take vitamin D3 4765-2206 internation units daily.     Please abstain from driving until further notice    If you are a biological female with epilepsy who is of reproductive age, who is actively breastfeeding, and/or who infants/young children:  Please take folic acid 1 mg daily. This is an over-the-counter supplement that is recommended to prevent certain developmental problems in your baby, in case you become pregnant in the future.  It is critical that you let our office know as soon as you become pregnant or plan to become pregnant.  If you are caring for a baby/young child, please make sure to be sitting on a soft surface while holding your baby/young child, so in case you have a seizure, your baby/young child is not injured due to fall.   Please let us know if, while breastfeeding, you observe that your baby is excessively sleepy and/or has other behavioral changes. Because many antiseizure medications are collected in breast milk, some nursing babies can suffer adverse medication effects.    Please note that the following might precipitate seizures:   missed doses of antiseizure medications  being sick with a fever, stress  Fatigue  sleep deprivation or abnormal sleeping patterns  not eating regularly  not drinking enough water  drinking too much alcohol  stopping alcohol suddenly if you are currently using it on a regular/daily basis,   using recreational drugs, among others.    Please note that the following might lead to an injury or even be life-threatening in the event you have a seizure and/or lose awareness while:  being in a large body of water by yourself, such as bath, pool, lake, ocean, among others (risk of drowning)  being on unprotected heights (risk of fall)  being around and/or operating heavy machinery (risk of injury)  being around open fire/hot surfaces (risk of burns)  any other activities/circumstances, in which if you lose awareness, you might  injure yourself and/or others.  -------------------------------------------------------------------------------------------  SUDEP (SUDDEN UNEXPECTED DEATH IN EPILEPSY)  It is important that your seizures are well controlled and you have none or have them rarely. In addition to avoiding injury related to breakthrough seizures, frequent seizures increase risk of SUDEP (sudden unexpected death in epilepsy), where a person goes into a seizure and then never wakes up. The best way to prevent SUDEP is to control your seizures well.   ------------------------------------------------------------------------------------------  Please call for help (crisis line and/or 911) in case you have thoughts of harming yourself and/or others.  ------------------------------------------------------------------------------------------  INSTRUCTIONS FOR YOUR FAMILY/CAREGIVERS:  Please call 911 if the patient has a seizure longer than 2-3 minutes, if seizures are back to back without her recovering to her baseline, or she does not start recovering within 5-10 minutes after the seizure stops. During the seizure - please turn her on her side, please make sure her head is protected (for example, you should put a pillow under her head, if one is available), and please do not put anything in her mouth.   ------------------------------------------------------------------------------------------  PATIENT EXPECTATIONS,  IMPORTANT APPOINTMENT REMINDERS, AND ADDITIONAL HELPFUL TIPS:   REFILLS:   Request refills AT LEAST 1 week in advance to ensure you do not run out of medications    MyChart  It is STRONGLY encouraged that ALL patients sign up for MyChart. It is BY FAR the fastest and most convenient way for both Dr. Farr and patients to obtain timely refills.  If you are having trouble signing up or logging into your account, staff are available to help you. Please ask a medical assistant or staff at the  to assist you.    TEST RESULS:    All labs and diagnostic test results will be reviewed at your next visit, UNLESS  Dr. Farr determines that there are important findings on the tests need to be acted on sooner. Dr. Farr will either call or send a message through [employer] if this is the case.    BE PREPARED PRIOR TO EVERY APPOINTMENT:  All patient are responsible for ensuring that ALL test results that were completed outside of the [facility] system have been received by our Neurology Department PRIOR to your appointment with Dr. Farr.    IMPORTANT:  ALL images (not just the reports) must be sent and uploaded to the [facility] system. Dr. Farr reviews all images personally prior to each visit. Ensuring that ALL the test results and test images are accessible to Dr. Farr prior to your appointment is YOUR responsibility and an important part of making the most out of each appointment.   Bring a government-issues picture ID and an updated insurance card EVERY visit.  It is highly recommended that you bring at every visit a list of the most important topics that you want address. While it may not be possible to address all items on the list in a single visit, preparing a list will ensure that Dr. Farr addresses the items that are most important to you and your health    PAPERWORK, DOCUMENTATION, LETTER REQUESTS:  You must notify the office ahead of your appointment of all paperwork or letter requests.   Please DO NOT wait until the last minute to make these requests. Please give all paperwork to the medical assistant at the start of the appointment and check-in process. Please note that Dr. Farr may not be able complete some types of documentation in a single appointment or even within a single day or week. This is why it is important to communicate paperwork requests prior to your appointment and at least 2 weeks prior to any deadlines.    KNOW ALL YOU MEDICATIONS:   AT EVERY SINGLE APPOINTMENT, please bring a list of every single prescribed,  non-prescribed, and over the counter medication or supplements you are taking, including ones taken on a rare or intermittent basis.  Include the following information for each prescribed or non-prescribed medications:  Name of medication   The strength of EACH pill/capsule/tablet, etc.   The number of pills/capsules/tablets, etc taken per dose  The number and time of day that doses are taken  For every single Supplement that you take on a routine or intermittent basis, you must include:  The Brand Name   A complete list of every single ingredient, compound, vitamin, and/or mineral in each dose, along with the corresponding amounts/strengths of all ingredients, vitamins, minerals, etc., if such information is provided or known  The number of doses taken per day and time of day doses are taken  If medications are taken on an intermittent or as needed basis, please estimate how many days per week or days per month the medications are used  DO NOT just print out your medication list from [employer] or bring a list from a prior appointment or hospitalizations because the information is often often unreliable, inaccurate, outdated, and/or incomplete   The list should be printed or written  If you forget or do not have a list of all the medication, then it is acceptable, although less preferred, to bring all the bottles to the appointment     ARRIVE EARLY FOR ALL VISITS:  Please note that we are unable to accommodate late arrivals as per office policy.  YOU-the patient - (NOT a parent, spouse, or friend) must be physically present at check-in no later than 12 minutes after the scheduled appointment time, or you will be asked to reschedule   Consider scheduling a virtual appointment with Dr. Farr through [employer] as an alternative if transportation to the clinic is difficult or unavailable   Please note, however, that virtual visits can only be scheduled after being an established patient of Dr. Farr. All new appointments  "must be done in-person in clinic  Some insurances will not cover the cost of virtual appointments. Please check with your insurance to find out if these visits are covered    COMMUNICATING URGENT AND NON-URGENT MATTERS:  Your concerns are important and deserve to be heard and addressed. If you have an urgent matter, there are two methods that will ensure your concerns are prioritized appropriately:   Preferred method: Sign-up/Login to your [employer] account and send a message addressed to Dr. Farr or Sonam Harmon (Dr. Farr's assistant). In the subject line, type \"urgent\" followed by a word or phrase describing the situation (For example, write \"Urgent: Out of antiseuzre med and need refill\" or \"Urgent: Severe side effects to new meds\". In doing this, our staff can ensure urgent messages are triaged appropriately and communicated to Dr. Farr that day.  Call [facility] main line at [phone]. Dr. Farr's voicemail extension is [phone]. When leaving a voice message, specifically indicate if it is urgent (or non-urgent) so that the matter can be triaged appropriately and addressed in a timely manner    Thank you for entrusting your neurological care to [facility] and we look forward to continuing to serve you.   "

## 2024-12-20 NOTE — PROGRESS NOTES
"NEUROLOGY FOLLOW-UP - 12/20/2024     REASON FOR VISIT: Braydon Sotelo 29 y.o. male presents today for follow-up     SUMMARY RELEVANT PAST MEDICAL HISTORY   This patient has been referred to me for evaluation / management of Epilepsy. He was seen in ED at [facility] by Dr. Ledezma on May 25 2024 whose note recounts relevant history:     \"28 y.o. man with epilepsy presenting with seizures for whom neurology has been consulted for management.  Some history obtained from the patient.  Additional history from the EMR and care team. Unable to reach patient contact for observer history.      Patient reports that he has had seizures in the past and was diagnosed with epilepsy in 2013.  He does not recall prior medications reports that he currently takes Briviact and lamotrigine and clonazepam.  He does endorse missed dosing of his antiseizure medications recently.  Does not know the dosing of his antiseizure medications.  No known illness or recent sick contacts.     Patient is amnestic to recent seizure activity leading to hospital admission.  His next memory was at [facility] emergency department.     No seizure recurrence this admission.  Patient feels a lot better today however has a sore tongue on the right side.  No new neurologic symptoms.     Patient tells me he does not have a current neurologist with whom he follows.  Last neurology visit in our system appears to be July 2022.  Patient reportedly has 1 seizure type described as generalized tonic-clonic seizures without aura.  Seizures seemingly well-controlled. At that time was on lamictal ER 200mg in am, Keppra ER 1500mg QHS, clonazepam 1mg QHS. Ativan 1mg PRN for breakthrough seizure. At some point was switched to Briviact. Dosing somewhat unclear.      Breakthrough seizure in the setting of missed antiseizure medication dosing as reported by the patient.  No other clear trigger or precipitant identified at this time.  Reportedly taking Lamictal 200 mg " "sustained-release daily, Briviact 50 mg twice daily and Klonopin 1 mg at bedtime. EEG completed; report is pending.  Prior MRI brain from January 2024 and 2013 were non-lesional.  Clinically the patient seems to be feeling much better today and there have been no reported interval seizures.  No further workup is necessary at this time from a neurologic perspective unless the patient has additional seizure activity.  \"    ------------------------------------     Patient is accompanied by his father who assists in providing the history. I have also reviewed notes from his prior neurologists Dr. Cooper.      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 He was given rescue Nayzilam which appears to have aborted seizure.     Patient occasionally does miss his doses. His sleep is sometimes erratic as he plays a lot of video games. He does have sleep apnea.     He is seeing a psychiatrist whom he likes for mood disorder and emotional outbursts. He is on citalopram       COMPENDIUM OF RELEVANT WORK-UP AND TREATMENTS TO DATE:    rEEG May 2024:   Abnormal video EEG recording in the awake, drowsy, and sleep state(s):  - Mild continuous generalized slowing of the background with excess beta activity diffusely. This finding is suggestive of mild cerebral dysfunction of a nonspecific etiology, with medication effects also present.  - No regional slowing or persistent focal asymmetries were seen.  - No epileptiform discharges or other epileptiform phenomena seen.  - No seizures. Clinical correlation is recommended.    Nov 2022 24 hour ambulatory EEG       IMAGING  Jan 2024 MRI Brain wo (done at [facility]) - unremarkable.   Nov 2013 MRI Brain wo - WNL    INTERVAL HISTORY:  Patient had 1 seizure since our last visit. Seizure occurred in mid November. He had forgotten hs antiseizure medications the night before as he had " fallen asleep and towards the morning seized. Aside from that he has not had ay seizures. Father gave Nayzilam spray twice 10 minutes apart as instructed. He gave it a second time because he did not appear to be return to his cognitive baseline and he wasn't sure if he was still having nonconvulsive seizures. He has been better, despite this one incident, at taking his medication more consistently. His father has been reminding him consistently as well. He uses a sticky note system to help him remember    Labs reviewed. Labs for Lamictal and briviact levels were not done.      Current AEDS:  Clonazepam 1 mg QHS  Lamictal  mg QD  Briviact 50 mg BID  Nayzilam 5 mg QHS       72 hour aEEG done Oct 2024:  INTERPRETATION:  Probably normal, although technically-limited ambulatory EEG recording in the awake, drowsy, and sleep state(s):  No seizures.   No persistent focal or regional slowing and no background asymmetries were seen.   One single spiky discharge captured in sleep, resembling a K complex although the midline centro-parietal and left > right parietal-temporal positive phase reversal is suspiciously epileptiform, although artifact is favored. Otherwise, no definitive epileptiform discharges.    No clinical events reported in the 72 hours  A normal study does not rule out a diagnosis of epilepsy       Sleep apnea on CPAP    CURRENT MEDICATIONS:  Current Outpatient Medications on File Prior to Visit   Medication Sig Dispense Refill    vitamin D2, Ergocalciferol, (DRISDOL) 1.25 MG (56778 UT) Cap capsule Take 1 Capsule by mouth every 7 days. 5 Capsule 5    Multiple Vitamins-Minerals (MULTIVITAMIN MEN PO) Take  by mouth.      citalopram (CELEXA) 20 MG Tab Take 1 Tablet by mouth at bedtime. 90 Tablet 2    Cyanocobalamin (B-12 PO) Take 1 Tablet by mouth every day.      gemfibrozil (LOPID) 600 MG Tab Take 1 Tablet by mouth 2 times a day. 180 Tablet 0    ibuprofen (MOTRIN) 200 MG Tab Take 400 mg by mouth one time.    "    No current facility-administered medications on file prior to visit.        EXAM:   /68 (BP Location: Right arm, Patient Position: Sitting, BP Cuff Size: Adult)   Pulse 70   Temp 37 °C (98.6 °F) (Temporal)   Resp 12   Ht 1.778 m (5' 10\")   Wt 109 kg (239 lb 10.2 oz)   SpO2 93%    Wt Readings from Last 5 Encounters:   12/20/24 109 kg (239 lb 10.2 oz)   10/05/24 93 kg (205 lb)   08/12/24 106 kg (233 lb 14.5 oz)   08/02/24 107 kg (235 lb)   05/31/24 106 kg (234 lb)      Physical Exam:  Physical Exam  Eyes:      Extraocular Movements: EOM normal. No nystagmus.   Neurological:      Mental Status: He is alert.      Motor: Motor strength is normal.     Coordination: Coordination is intact.   Psychiatric:         Speech: Speech normal.        Neurological Exam   Neurological Exam  Mental Status  Alert. Recent and remote memory are intact. Speech is normal. Follows two-step commands. Attention and concentration are normal. Fund of knowledge is appropriate for level of education.  Some trouble following multistep commands.    Cranial Nerves  CN II: Right funduscopic exam: not visualized. Left funduscopic exam: not visualized.  CN III, IV, VI: Extraocular movements intact bilaterally. No nystagmus. Normal saccades. Normal smooth pursuit.   Right pupil: 3 mm. Round. Reactive to light. Reactive to accommodation.   Left pupil: 3 mm. Round. Reactive to light. Reactive to accommodation.  Relative afferent pupillary defect absent.  CN V: Facial sensation is normal.  CN VII: Full and symmetric facial movement.  CN IX, X: Palate elevates symmetrically  CN XI: Shoulder shrug strength is normal.  CN XII: Tongue midline without atrophy or fasciculations.    Motor  Normal muscle bulk throughout. No fasciculations present. Normal muscle tone. No abnormal involuntary movements. Strength is 5/5 throughout all four extremities.    Sensory  Light touch is normal in upper and lower extremities.     Reflexes  Deep tendon reflexes " are 2+ and symmetric except as noted.    Coordination    Finger-to-nose, rapid alternating movements and heel-to-shin normal bilaterally without dysmetria.    Gait  Casual gait is normal including stance, stride, and arm swing.       ASSESSMENT, EDUCATION, AND COUNSELING:  This is a 29 y.o. male patient who presents to the neurology clinic. We had an extensive discussion about the patient's symptoms, signs, and work-up to date, if any. We discussed potential and/or definitive diagnoses, work-up, and potential treatments.     PLAN:  Medications administered in today's encounter if applicable:       If applicable, the work-up such as labs, imaging, procedures, and/or other testing, referrals, and/or recommended treatment strategies are listed below.  Orders Placed This Encounter    BRIVIACT 50 MG Tab tablet    clonazePAM (KLONOPIN) 1 MG Tab    LamoTRIgine 200 MG TABLET SR 24 HR    Midazolam (NAYZILAM) 5 MG/0.1ML Solution     Lab Frequency Next Occurrence   Miscellaneous Test Once 08/12/2024         Medication List            Accurate as of December 20, 2024 12:04 PM. If you have any questions, ask your nurse or doctor.                CHANGE how you take these medications        Instructions   LamoTRIgine 200 MG Tb24  What changed: Another medication with the same name was removed. Continue taking this medication, and follow the directions you see here.  Changed by: Dr. Ron Farr   Take 1 Tablet by mouth every morning for 360 days.  Dose: 1 Tablet     Nayzilam 5 MG/0.1ML Soln  What changed: reasons to take this  Generic drug: Midazolam  Changed by: Dr. Ron Farr   Doctor's comments: Request 10 spray bottles of 5 mg to cover 30 days  Administer 5 mg into affected nostril(S) 1 time a day as needed (Spray in nostril for any tonic clonic seizure lasting > 3 minute. If still seizing after 5-10 minutes, administer another 5 mg in other nostril.) for up to 30 days.  Dose: 5 mg            CONTINUE taking these  medications        Instructions   B-12 PO   Take 1 Tablet by mouth every day.  Dose: 1 Tablet     Briviact 50 MG Tabs tablet  Generic drug: brivaracetam   Doctor's comments: Request 60 tabs of 50 mg to cover 30 days plus 5 refills for a total coverage of 180 days.  Take 1 Tablet by mouth 2 times a day for 180 days.  Dose: 50 mg     citalopram 20 MG Tabs  Commonly known as: CeleXA   Take 1 Tablet by mouth at bedtime.  Dose: 20 mg     clonazePAM 1 MG Tabs  Commonly known as: KlonoPIN   Doctor's comments: Request 90 tabs of 1 mg to cover 90 days plus 1 refill for a total of 180 days  Take 1 Tablet by mouth every evening for 180 days.  Dose: 1 mg     gemfibrozil 600 MG Tabs  Commonly known as: Lopid   Take 1 Tablet by mouth 2 times a day.  Dose: 600 mg     ibuprofen 200 MG Tabs  Commonly known as: Motrin   Take 400 mg by mouth one time.  Dose: 400 mg     MULTIVITAMIN MEN PO   Take  by mouth.     vitamin D2 (Ergocalciferol) 1.25 MG (78069 UT) Caps capsule  Commonly known as: Drisdol   Take 1 Capsule by mouth every 7 days.  Dose: 50,000 Units               Patient with focal epilepsy with recent breakthrough seizure last month again not he setting of missing his medications. Counseled patient, father again on the importance of being perfect with taking his medications all the time. It is possible that if he were able to consistently take his medications that he may not have seizures. His most recent ambulatory EEG looked very good and was essentially normal, improvements likely attributed to the addition of briviact. Therefore, no changes to his antseizure medications. All of the following medications refilled today:    Clonazepam 1 mg QHS  Lamictal  mg QD  Briviact 50 mg BID  Nayzilam 5 mg QHS    He should also continue vitamin D supplement [id number] IU per day.     Regarding Sleep apnea - continue CPAP    Follow-up in 6 months, sooner if needed      BILLING DOCUMENTATION:     The number of minutes of face-to-face  time spent in this encounter was I spent a total of 30 minutes on the day of the visit.  . Over 50% of the time of the visit today was spent on counseling and/or coordination of care wtih the patient and/or family, as outlined above in assessment in plan.    Ron Farr MD  Department of Neurology at [facility]  Diplomate of the American Board of Psychiatry and Neurology, General Neurology  Diplomate of American Board of Psychiatry and Neurology, a Member Board of the American Board of Medical Subspecialties, Epilepsy  Director of [facility] Level III Comprehensive Epilepsy Program  Professor of Clinical Neurology, [facility].   [address], [address]  [place] [id number] [phone]   Fax: [phone]  E-mail: [email or web address]

## 2025-01-07 ENCOUNTER — OFFICE VISIT (OUTPATIENT)
Dept: MEDICAL GROUP | Age: 30
End: 2025-01-07
Payer: MEDICARE

## 2025-01-07 VITALS
BODY MASS INDEX: 34.79 KG/M2 | HEART RATE: 104 BPM | OXYGEN SATURATION: 97 % | DIASTOLIC BLOOD PRESSURE: 60 MMHG | WEIGHT: 243 LBS | HEIGHT: 70 IN | SYSTOLIC BLOOD PRESSURE: 102 MMHG | TEMPERATURE: 98 F

## 2025-01-07 DIAGNOSIS — J02.9 SORE THROAT: ICD-10-CM

## 2025-01-07 LAB — S PYO DNA SPEC NAA+PROBE: NOT DETECTED

## 2025-01-07 PROCEDURE — 99214 OFFICE O/P EST MOD 30 MIN: CPT | Performed by: FAMILY MEDICINE

## 2025-01-07 PROCEDURE — 3078F DIAST BP <80 MM HG: CPT | Performed by: FAMILY MEDICINE

## 2025-01-07 PROCEDURE — 87651 STREP A DNA AMP PROBE: CPT | Performed by: FAMILY MEDICINE

## 2025-01-07 PROCEDURE — 3074F SYST BP LT 130 MM HG: CPT | Performed by: FAMILY MEDICINE

## 2025-01-07 ASSESSMENT — FIBROSIS 4 INDEX: FIB4 SCORE: 0.47

## 2025-01-07 NOTE — PROGRESS NOTES
This medical record contains text that has been entered with the assistance of computer voice recognition and dictation software.  Therefore, it may contain unintended errors in text, spelling, punctuation, or grammar      Verbal consent was acquired by the patient to use [employer] ambient listening note generation during this visit Yes       Chief Complaint   Patient presents with    Pharyngitis     Ongoing for 2 days and seems symptoms are getting worst         Braydon Sotelo is a 29 y.o. male here evaluation and management of: pharyngitis      HPI:     HCC Gap Form    Last edited 01/07/25 13:04 PST by Rajat Burciaga M.D.           1. Sore throat    History of Present Illness  The patient is a 29-year-old male who presents for evaluation of a sore throat.    He has been experiencing a sore throat for the past 2 days. He reports no feverish symptoms but admits to feeling fatigued. He describes a sensation of his throat being pulled when he uses a sleep pack, which he attributes to the addition of distilled water in the water chamber and subsequent mask application. He attempted to alleviate his symptoms with a warm saltwater gargle last night, but this proved ineffective. He also tried using Chloraseptic, a product that typically provides relief during severe sore throat episodes by numbing the area, but it failed to work on this occasion.              Current medicines (including changes today)  Current Outpatient Medications   Medication Sig Dispense Refill    Ascorbic Acid 500 MG Cap TAKE 2 TABS PO TID 30 Capsule 0    Zinc Acetate, Oral, 50 MG Cap Take 1 Capsule by mouth 2 times a day. 14 Capsule 0    BRIVIACT 50 MG Tab tablet Take 1 Tablet by mouth 2 times a day for 180 days. 60 Tablet 5    clonazePAM (KLONOPIN) 1 MG Tab Take 1 Tablet by mouth every evening for 180 days. 90 Tablet 1    LamoTRIgine 200 MG TABLET SR 24 HR Take 1 Tablet by mouth every morning for 360 days. 90 Tablet 3    Midazolam  (NAYZILAM) 5 MG/0.1ML Solution Administer 5 mg into affected nostril(S) 1 time a day as needed (Spray in nostril for any tonic clonic seizure lasting > 3 minute. If still seizing after 5-10 minutes, administer another 5 mg in other nostril.) for up to 30 days. 10 Each 0    vitamin D2, Ergocalciferol, (DRISDOL) 1.25 MG (37023 UT) Cap capsule Take 1 Capsule by mouth every 7 days. 5 Capsule 5    Multiple Vitamins-Minerals (MULTIVITAMIN MEN PO) Take  by mouth.      citalopram (CELEXA) 20 MG Tab Take 1 Tablet by mouth at bedtime. 90 Tablet 2    Cyanocobalamin (B-12 PO) Take 1 Tablet by mouth every day.      gemfibrozil (LOPID) 600 MG Tab Take 1 Tablet by mouth 2 times a day. 180 Tablet 0    ibuprofen (MOTRIN) 200 MG Tab Take 400 mg by mouth one time.       No current facility-administered medications for this visit.     He  has a past medical history of Autism, Autism spectrum disorder, and Seizure (HCC).  He  has a past surgical history that includes tonsillectomy and adenoidectomy; other (1/10/2014); and tonsillectomy.  Social History     Tobacco Use    Smoking status: Never    Smokeless tobacco: Never   Vaping Use    Vaping status: Never Used   Substance Use Topics    Alcohol use: No     Alcohol/week: 0.0 oz    Drug use: No     Social History     Social History Narrative    Not on file     Family History   Problem Relation Age of Onset    No Known Problems Mother     Sleep Apnea Father     Hypertension Father     Obesity Father     Lung Disease Paternal Aunt     Lung Disease Paternal Grandmother     No Known Problems Maternal Grandmother     Diabetes Maternal Grandfather     Hyperlipidemia Paternal Grandfather      Family Status   Relation Name Status    Mo  Alive    Fa  Alive    PAunt      PGMo      MGMo      MGFa      PGFa     No partnership data on file         ROS    The pertinent  ROS findings can be seen in the HPI above.     All other systems reviewed and are negative      "Objective:     /60 (BP Location: Left arm, Patient Position: Sitting, BP Cuff Size: Large adult)   Pulse (!) 104   Temp 36.7 °C (98 °F) (Temporal)   Ht 1.778 m (5' 10\")   Wt 110 kg (243 lb)   SpO2 97%  Body mass index is 34.87 kg/m².      Physical Exam:    Constitutional: Alert, no distress.  Skin: No suspicious lesions  Eye: Equal, round and reactive, conjunctiva clear, lids normal.  ENMT: Lips without lesions, good dentition, oropharynx clear.  Neck: Trachea midline, no masses, no thyromegaly. No cervical or supraclavicular lymphadenopathy.  Respiratory: Unlabored respiratory effort, lungs clear to auscultation, no wheezes, no ronchi.  Cardiovascular: Normal S1, S2, no murmur, no edema  Abdomen: Soft, non-tender, no masses, no hepatosplenomegaly.        Assessment and Plan:   The following treatment plan was discussed    All recent labs and provider notes reviewed    1. Sore throat    He was negative for group A strep  He he was informed to proceed with gargling with warm water and salt 3 times a day  Vitamin C and zinc      - POCT CEPHEID GROUP A STREP - PCR  - Ascorbic Acid 500 MG Cap; TAKE 2 TABS PO TID  Dispense: 30 Capsule; Refill: 0  - Zinc Acetate, Oral, 50 MG Cap; Take 1 Capsule by mouth 2 times a day.  Dispense: 14 Capsule; Refill: 0         Instructed to Follow up in clinic or ER for worsening symptoms, difficulty breathing, lack of expected recovery, or should new symptoms or problems arise.    Followup: Return in about 3 months (around 4/7/2025) for Reevaluation, labs.               "

## 2025-01-07 NOTE — LETTER
January 7, 2025         Patient: Braydon Sotelo   YOB: 1995   Date of Visit: 1/7/2025           To Whom it May Concern:    Braydon Sotelo was seen in my clinic on 1/7/2025. He would be excused from work until January 10, 2025.    If you have any questions or concerns, please don't hesitate to call.        Sincerely,           Rajat Burciaga M.D.  Electronically Signed     
Other:

## 2025-01-10 PROBLEM — J96.02 ACUTE RESPIRATORY FAILURE WITH HYPOXIA AND HYPERCAPNIA (HCC): Status: RESOLVED | Noted: 2024-05-25 | Resolved: 2025-01-10

## 2025-01-10 PROBLEM — J96.01 ACUTE RESPIRATORY FAILURE WITH HYPOXIA AND HYPERCAPNIA (HCC): Status: RESOLVED | Noted: 2024-05-25 | Resolved: 2025-01-10

## 2025-01-16 PROBLEM — E78.2 MIXED HYPERLIPIDEMIA: Status: ACTIVE | Noted: 2025-01-16

## 2025-01-16 NOTE — ASSESSMENT & PLAN NOTE
Chronic, stable condition. His last seizure was Oct 2024. He maintains on Briviact 50 mg BID, lamotrigine 200 mg daily, and has midazolam nasal spray for PRN use. Sees Neurology.

## 2025-01-16 NOTE — ASSESSMENT & PLAN NOTE
Chronic condition. He lives with his parents but is quite independent. 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 Naturally, he has some anxiety and mood dysregulation. He sees a therapist once a month. He also takes celexa 20 mg daily as well as klonopin 1 mg nightly. Doing well overall. Follow up with PCP and/or therapist as needed.

## 2025-01-16 NOTE — ASSESSMENT & PLAN NOTE
Chronic, stable. He currently takes gemfibrozil 600 mg BID. We discussed his dietary/lifestyle regimen. Follow up with PCP for continued monitoring and management.  Lab Results   Component Value Date/Time    CHOLSTRLTOT 177 12/13/2024 08:50 AM    TRIGLYCERIDE 187 (H) 12/13/2024 08:50 AM    HDL 34 (A) 12/13/2024 08:50 AM     (H) 12/13/2024 08:50 AM

## 2025-01-16 NOTE — ASSESSMENT & PLAN NOTE
Chronic, stable. Compliant with CPAP at night. Does not require supplemental O2. Follow up with PCP at least annually for continued monitoring and management.

## 2025-01-17 ENCOUNTER — OFFICE VISIT (OUTPATIENT)
Dept: FAMILY PLANNING/WOMEN'S HEALTH CLINIC | Facility: PHYSICIAN GROUP | Age: 30
End: 2025-01-17
Payer: MEDICARE

## 2025-01-17 VITALS
HEART RATE: 87 BPM | DIASTOLIC BLOOD PRESSURE: 60 MMHG | BODY MASS INDEX: 35.36 KG/M2 | OXYGEN SATURATION: 94 % | WEIGHT: 247 LBS | HEIGHT: 70 IN | SYSTOLIC BLOOD PRESSURE: 104 MMHG

## 2025-01-17 DIAGNOSIS — F84.0 AUTISM: ICD-10-CM

## 2025-01-17 DIAGNOSIS — E66.01 SEVERE OBESITY (BMI 35.0-35.9 WITH COMORBIDITY) (HCC): ICD-10-CM

## 2025-01-17 DIAGNOSIS — F06.4 ANXIETY DISORDER DUE TO KNOWN PHYSIOLOGICAL CONDITION: ICD-10-CM

## 2025-01-17 DIAGNOSIS — F39 MILD MOOD DISORDER (HCC): ICD-10-CM

## 2025-01-17 DIAGNOSIS — E78.2 MIXED HYPERLIPIDEMIA: ICD-10-CM

## 2025-01-17 DIAGNOSIS — G47.33 OSA (OBSTRUCTIVE SLEEP APNEA): ICD-10-CM

## 2025-01-17 DIAGNOSIS — F13.20 SEDATIVE DEPENDENCE WITH CURRENT USE (HCC): ICD-10-CM

## 2025-01-17 DIAGNOSIS — G40.001 PARTIAL IDIOPATHIC EPILEPSY WITH SEIZURES OF LOCALIZED ONSET, NOT INTRACTABLE, WITH STATUS EPILEPTICUS (HCC): ICD-10-CM

## 2025-01-17 PROCEDURE — 1126F AMNT PAIN NOTED NONE PRSNT: CPT

## 2025-01-17 PROCEDURE — G0439 PPPS, SUBSEQ VISIT: HCPCS

## 2025-01-17 PROCEDURE — 3074F SYST BP LT 130 MM HG: CPT

## 2025-01-17 PROCEDURE — 3078F DIAST BP <80 MM HG: CPT

## 2025-01-17 SDOH — ECONOMIC STABILITY: HOUSING INSECURITY: AT ANY TIME IN THE PAST 12 MONTHS, WERE YOU HOMELESS OR LIVING IN A SHELTER (INCLUDING NOW)?: NO

## 2025-01-17 SDOH — ECONOMIC STABILITY: TRANSPORTATION INSECURITY: IN THE PAST 12 MONTHS, HAS LACK OF TRANSPORTATION KEPT YOU FROM MEDICAL APPOINTMENTS OR FROM GETTING MEDICATIONS?: NO

## 2025-01-17 SDOH — ECONOMIC STABILITY: FOOD INSECURITY: WITHIN THE PAST 12 MONTHS, THE FOOD YOU BOUGHT JUST DIDN'T LAST AND YOU DIDN'T HAVE MONEY TO GET MORE.: NEVER TRUE

## 2025-01-17 SDOH — ECONOMIC STABILITY: FOOD INSECURITY: WITHIN THE PAST 12 MONTHS, YOU WORRIED THAT YOUR FOOD WOULD RUN OUT BEFORE YOU GOT THE MONEY TO BUY MORE.: NEVER TRUE

## 2025-01-17 SDOH — ECONOMIC STABILITY: HOUSING INSECURITY: IN THE LAST 12 MONTHS, WAS THERE A TIME WHEN YOU WERE NOT ABLE TO PAY THE MORTGAGE OR RENT ON TIME?: NO

## 2025-01-17 SDOH — ECONOMIC STABILITY: HOUSING INSECURITY: IN THE PAST 12 MONTHS, HOW MANY TIMES HAVE YOU MOVED WHERE YOU WERE LIVING?: 1

## 2025-01-17 SDOH — ECONOMIC STABILITY: FOOD INSECURITY: HOW HARD IS IT FOR YOU TO PAY FOR THE VERY BASICS LIKE FOOD, HOUSING, MEDICAL CARE, AND HEATING?: NOT HARD AT ALL

## 2025-01-17 ASSESSMENT — FIBROSIS 4 INDEX: FIB4 SCORE: 0.47

## 2025-01-17 ASSESSMENT — ACTIVITIES OF DAILY LIVING (ADL): LACK_OF_TRANSPORTATION: NO

## 2025-01-17 ASSESSMENT — PAIN SCALES - GENERAL: PAINLEVEL_OUTOF10: NO PAIN

## 2025-01-17 ASSESSMENT — ENCOUNTER SYMPTOMS: GENERAL WELL-BEING: GOOD

## 2025-01-17 ASSESSMENT — PATIENT HEALTH QUESTIONNAIRE - PHQ9: CLINICAL INTERPRETATION OF PHQ2 SCORE: 0

## 2025-01-17 NOTE — PROGRESS NOTES
Comprehensive Health Assessment Program     Braydon Sotelo is a 29 y.o. here for his comprehensive health assessment. He is accompanied with his father.     Patient Active Problem List    Diagnosis Date Noted    Severe obesity (BMI 35.0-35.9 with comorbidity) ([facility]) 01/17/2025    Mixed hyperlipidemia 01/16/2025    Seizure ([facility]) 05/25/2024    Sleep apnea 05/25/2024    Mild mood disorder ([facility]) 02/02/2024    Cough 08/13/2021    KACY (obstructive sleep apnea) 04/23/2020    Otitis of both ears 03/28/2019    Obesity (BMI 30-39.9) 05/25/2017    Anxiety disorder 03/22/2017    Autism 10/04/2016    Epilepsy ([facility]) 01/05/2015       Current Outpatient Medications   Medication Sig Dispense Refill    Ascorbic Acid 500 MG Cap TAKE 2 TABS PO TID 30 Capsule 0    BRIVIACT 50 MG Tab tablet Take 1 Tablet by mouth 2 times a day for 180 days. 60 Tablet 5    clonazePAM (KLONOPIN) 1 MG Tab Take 1 Tablet by mouth every evening for 180 days. 90 Tablet 1    LamoTRIgine 200 MG TABLET SR 24 HR Take 1 Tablet by mouth every morning for 360 days. 90 Tablet 3    Midazolam (NAYZILAM) 5 MG/0.1ML Solution Administer 5 mg into affected nostril(S) 1 time a day as needed (Spray in nostril for any tonic clonic seizure lasting > 3 minute. If still seizing after 5-10 minutes, administer another 5 mg in other nostril.) for up to 30 days. 10 Each 0    vitamin D2, Ergocalciferol, (DRISDOL) 1.25 MG (88294 UT) Cap capsule Take 1 Capsule by mouth every 7 days. 5 Capsule 5    Multiple Vitamins-Minerals (MULTIVITAMIN MEN PO) Take  by mouth.      citalopram (CELEXA) 20 MG Tab Take 1 Tablet by mouth at bedtime. 90 Tablet 2    Cyanocobalamin (B-12 PO) Take 1 Tablet by mouth every day.      gemfibrozil (LOPID) 600 MG Tab Take 1 Tablet by mouth 2 times a day. 180 Tablet 0    ibuprofen (MOTRIN) 200 MG Tab Take 400 mg by mouth one time.       No current facility-administered medications for this visit.          Current supplements as per medication list.      Allergies:   Phentermine and Phentermine  Social History     Tobacco Use    Smoking status: Never    Smokeless tobacco: Never   Vaping Use    Vaping status: Never Used   Substance Use Topics    Alcohol use: No     Alcohol/week: 0.0 oz    Drug use: No     Family History   Problem Relation Age of Onset    No Known Problems Mother     Sleep Apnea Father     Hypertension Father     Obesity Father     Lung Disease Paternal Aunt     Lung Disease Paternal Grandmother     No Known Problems Maternal Grandmother     Diabetes Maternal Grandfather     Hyperlipidemia Paternal Grandfather      Braydon  has a past medical history of Autism, Autism spectrum disorder, and Seizure (HCC).   Past Surgical History:   Procedure Laterality Date    OTHER  1/10/2014    wisdom teeth extraction    TONSILLECTOMY      TONSILLECTOMY AND ADENOIDECTOMY         Screening:  In the last six months have you experienced any leakage of urine? No    Depression Screening  Little interest or pleasure in doing things?  0 - not at all  Feeling down, depressed , or hopeless? 0 - not at all  Trouble falling or staying asleep, or sleeping too much?     Feeling tired or having little energy?     Poor appetite or overeating?     Feeling bad about yourself - or that you are a failure or have let yourself or your family down?    Trouble concentrating on things, such as reading the newspaper or watching television?    Moving or speaking so slowly that other people could have noticed.  Or the opposite - being so fidgety or restless that you have been moving around a lot more than usual?     Thoughts that you would be better off dead, or of hurting yourself?     Patient Health Questionnaire Score:      If depressive symptoms identified deferred to follow up visit unless specifically addressed in assessment and plan.    Interpretation of PHQ-9 Total Score   Score Severity   1-4 No Depression   5-9 Mild Depression   10-14 Moderate Depression   15-19 Moderately  Severe Depression   20-27 Severe Depression    Screening for Cognitive Impairment  Do you or any of your friends or family members have any concern about your memory? No  Three Minute Recall (Leader, Season, Table) 3/3    Cristopher clock face with all 12 numbers and set the hands to show 10 minutes after 11.  Yes    Cognitive concerns identified deferred for follow up unless specifically addressed in assessment and plan.    Fall Risk Assessment  Has the patient had two or more falls in the last year or any fall with injury in the last year?  No    Safety Assessment  Do you always wear your seatbelt?  Yes  Any changes to home needed to function safely? No  Difficulty hearing.  No  Patient counseled about all safety risks that were identified.    Functional Assessment ADLs  Are there any barriers preventing you from cooking for yourself or meeting nutritional needs?  No.    Are there any barriers preventing you from driving safely or obtaining transportation?  Yes.    Are there any barriers preventing you from using a telephone or calling for help?  No    Are there any barriers preventing you from shopping?  No.    Are there any barriers preventing you from taking care of your own finances?  No    Are there any barriers preventing you from managing your medications?  No    Are there any barriers preventing you from showering, bathing or dressing yourself?      Are there any barriers preventing you from doing housework or laundry? No    Are there any barriers preventing you from using the toilet?No    Are you currently engaging in any exercise or physical activity?  Yes.  His hobbies include guitar playing, collecting video games, plays flag football, takes dog for a walk. 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    Self-Assessment of Health  What is your perception of your health? Good    Do you sleep more than six hours a night? Yes    In the past 7 days, how much did pain keep you from doing your normal work? None     Do you spend quality time with family or friends (virtually or in person)? Yes    Do you usually eat a heart healthy diet that constists of a variety of fruits, vegetables, whole grains and fiber? Yes    Do you eat foods high in fat and/or Fast Food more than three times per week? No    How concerned are you that your medical conditions are not being well managed? Not at all    Are you worried that in the next 2 months, you may not have stable housing that you own, rent, or stay in as part of a household? No        Advance Care Planning  Do you have an Advance Directive, Living Will, Durable Power of , or POLST? No                 Health Maintenance Summary            Overdue - HIV Screening (Once) Never done      No completion history exists for this topic.              Annual Wellness Visit (Yearly) Next due on 1/17/2026 01/17/2025  Level of Service: CT ANNUAL WELLNESS VISIT-INCLUDES PPPS SUBSEQUE*    06/07/2023  Visit Dx: Medicare annual wellness visit, initial              IMM DTaP/Tdap/Td Vaccine (9 - Td or Tdap) Next due on 8/2/2029 08/02/2019  Imm Admin: Tdap Vaccine    04/08/2008  Imm Admin: Tdap Vaccine    04/06/2008  Imm Admin: Tdap Vaccine    09/13/2000  Imm Admin: Dtap Vaccine    09/15/1997  Imm Admin: Dtap Vaccine    Only the first 5 history entries have been loaded, but more history exists.              Polio Vaccine (Inactivated Polio) (Series Information) Completed      09/13/2000  Imm Admin: IPV    1996  Imm Admin: OPV TRIVALENT - HISTORICAL DATA (GIVEN PRIOR TO MAY 2016)    1996  Imm Admin: OPV TRIVALENT - HISTORICAL DATA (GIVEN PRIOR TO MAY 2016)    1996  Imm Admin: OPV TRIVALENT - HISTORICAL DATA (GIVEN PRIOR TO MAY 2016)              Hepatitis B Vaccine (Hep B) (Series Information) Completed      03/08/2002  Imm Admin: Hepatitis B Vaccine Adolescent/Pediatric    1996  Imm Admin: Hepatitis B Vaccine Adolescent/Pediatric    1996  Imm Admin:  Hepatitis B Vaccine Adolescent/Pediatric    1996  Imm Admin: Hepatitis B Vaccine Adolescent/Pediatric              Hepatitis A Vaccine (Hep A) (Series Information) Completed      04/16/2004  Imm Admin: Hepatitis A Vaccine, Ped/Adol    08/15/2002  Imm Admin: Hepatitis A Vaccine, Ped/Adol              Chickenpox Vaccine (Varicella) (Series Information) Completed      05/05/2008  Imm Admin: Varicella Vaccine Live    06/18/1997  Imm Admin: Varicella Vaccine Live              Meningococcal Immunization (Series Information) Aged Out      05/24/2017  Imm Admin: Meningococcal Conjugate Vaccine MCV4 (Menactra)    05/05/2008  Imm Admin: Meningococcal Conjugate Vaccine MCV4 (MENVEO)    05/05/2008  Imm Admin: Meningococcal Conjugate Vaccine MCV4 (Menactra)              HPV Vaccines (Series Information) Completed      12/27/2017  Imm Admin: 9VHPV VACCINE 2-3 DOSE IM (GARDASIL 9)    07/26/2017  Imm Admin: 9VHPV VACCINE 2-3 DOSE IM (GARDASIL 9)    05/24/2017  Imm Admin: 9VHPV VACCINE 2-3 DOSE IM (GARDASIL 9)              Hepatitis C Screening  Tentatively Complete      06/26/2023  Hepatitis C Antibody component of HEP C VIRUS ANTIBODY              Influenza Vaccine (Series Information) Completed      10/13/2024  Imm Admin: Influenza split virus trivalent (PF)    10/06/2023  Imm Admin: Influenza Vaccine Quad Inj (Pf)    10/06/2022  Imm Admin: Influenza Vaccine Quad Inj (Pf)    10/09/2021  Imm Admin: Influenza Vaccine Quad Inj (Pf)    09/05/2020  Imm Admin: Influenza Vaccine Quad Inj (Pf)    Only the first 5 history entries have been loaded, but more history exists.              COVID-19 Vaccine (Series Information) Completed      10/13/2024  Imm Admin: Covid-19 Mrna (Spikevax) Moderna 12+ Years    10/20/2023  Imm Admin: Covid-19 Mrna (Spikevax) Moderna 12+ Years    12/07/2022  Imm Admin: PFIZER BIVALENT SARS-COV-2 VACCINE (12+)    10/06/2022  Imm Admin: PFIZER BIVALENT SARS-COV-2 VACCINE (12+)    12/04/2021  Imm Admin:  "[place] SARS-CoV-2 Vaccine    Only the first 5 history entries have been loaded, but more history exists.              Pneumococcal Vaccine: 0-64 Years (Series Information) Aged Out      No completion history exists for this topic.                    Patient Care Team:  Rajat Burciaga M.D. as PCP - General (Family Medicine)  Melissa P Bloch, M.D. as Consulting Physician (Neurology)  Carolyn Thakkar P.A.-C. as Pulmonologist (Physician Assistant)  Preferred (DME Supplier)  Rajat Burciaga M.D. (Family Medicine)  Maida Mart R.N. as     Financial Resource Strain: Low Risk  (1/17/2025)    Overall Financial Resource Strain (CARDIA)     Difficulty of Paying Living Expenses: Not hard at all      Transportation Needs: No Transportation Needs (1/17/2025)    PRAPARE - Transportation     Lack of Transportation (Medical): No     Lack of Transportation (Non-Medical): No      Food Insecurity: No Food Insecurity (1/17/2025)    Hunger Vital Sign     Worried About Running Out of Food in the Last Year: Never true     Ran Out of Food in the Last Year: Never true        Encounter Vitals  Blood Pressure: 104/60  Pulse: 87  Pulse Oximetry: 94 %  Weight: 112 kg (247 lb)  Height: 177.8 cm (5' 10\")  BMI (Calculated): 35.44  Pain Score: No pain     Physical Exam:  Constitutional: NAD  HENMT: NC/AT, EOMI  Cardiovascular: RRR, No m/r/g  Lungs: CTAB, no w/r/r  Extremities: No c/c/e  Neurologic: Alert & oriented x3, CN II-XII grossly intact    Assessment and Plan. The following treatment and monitoring plan is recommended:    Mixed hyperlipidemia  Chronic, stable. He currently takes gemfibrozil 600 mg BID. We discussed his dietary/lifestyle regimen. Follow up with PCP for continued monitoring and management.  Lab Results   Component Value Date/Time    CHOLSTRLTOT 177 12/13/2024 08:50 AM    TRIGLYCERIDE 187 (H) 12/13/2024 08:50 AM    HDL 34 (A) 12/13/2024 08:50 AM     (H) 12/13/2024 08:50 AM     KACY " (obstructive sleep apnea)  Chronic, stable. Compliant with CPAP at night. Does not require supplemental O2. Follow up with PCP at least annually for continued monitoring and management.    Autism  Anxiety disorder  Mild mood disorder (HCC)  Chronic condition. He lives with his parents but is quite independent. He holds a part time job at [employer] in their [employer] department. Naturally, he has some anxiety and mood dysregulation. He sees a therapist once a month. He also takes celexa 20 mg daily as well as klonopin 1 mg nightly. Doing well overall. Follow up with PCP and/or therapist as needed.     Epilepsy ([facility])  Chronic, stable condition. His last seizure was Oct 2024. He maintains on Briviact 50 mg BID, lamotrigine 200 mg daily, and has midazolam nasal spray for PRN use. Sees Neurology.     Severe obesity (BMI 35.0-35.9 with comorbidity) ([facility])  Chronic, stable. Weight in office today is 247 lbs. BMI 35.44 kg/m2. Braydon is active with his job where he works part time. He also plays flag football. Unfortunately, his seizure medications can cause weight gain. He was placed on phentermine at one point to help with weight loss but it triggered a manic episode that landed him in senior living overnight. We discussed his current diet/exercise regimen. Encouraged to remain physically active as well as monitor intake of excess calories. Follow up with PCP for continued monitoring. Comorbidity includes HLD.    Sedative dependence with current use ([facility])  He uses Klonopin 1 mg nightly to help with his sleep. Reports his sleep is good in quality, but he has a hard time getting in a routine of going to bed at an appropriate time. No complications with the medication.    Services suggested: No services needed at this time  Health Care Screening: Age-appropriate preventive services recommended by USPTF and ACIP covered by Medicare were discussed today. Services ordered if indicated and agreed upon by the patient.  Referrals offered:  Community-based lifestyle interventions to reduce health risks and promote self-management and wellness, fall prevention, nutrition, physical activity, tobacco-use cessation, weight loss, and mental health services as per orders if indicated.    Discussion today about general wellness and lifestyle habits:    Prevent falls and reduce trip hazards; Cautioned about securing or removing rugs.  Have a working fire alarm and carbon monoxide detector.  Engage in regular physical activity and social activities.    Follow-up: Return for appointment with Primary Care Provider as needed..

## 2025-01-18 NOTE — ASSESSMENT & PLAN NOTE
Chronic, stable. Weight in office today is 247 lbs. BMI 35.44 kg/m2. Braydon is active with his job where he works part time. He also plays flag football. Unfortunately, his seizure medications can cause weight gain. He was placed on phentermine at one point to help with weight loss but it triggered a manic episode that landed him in senior living overnight. We discussed his current diet/exercise regimen. Encouraged to remain physically active as well as monitor intake of excess calories. Follow up with PCP for continued monitoring. Comorbidity includes HLD.

## 2025-02-07 ENCOUNTER — OFFICE VISIT (OUTPATIENT)
Dept: MEDICAL GROUP | Age: 30
End: 2025-02-07
Payer: MEDICARE

## 2025-02-07 VITALS
DIASTOLIC BLOOD PRESSURE: 72 MMHG | SYSTOLIC BLOOD PRESSURE: 122 MMHG | HEART RATE: 79 BPM | HEIGHT: 70 IN | TEMPERATURE: 97.9 F | BODY MASS INDEX: 34.65 KG/M2 | OXYGEN SATURATION: 95 % | WEIGHT: 242 LBS

## 2025-02-07 DIAGNOSIS — E66.9 OBESITY (BMI 30-39.9): ICD-10-CM

## 2025-02-07 DIAGNOSIS — E78.2 MIXED HYPERLIPIDEMIA: ICD-10-CM

## 2025-02-07 DIAGNOSIS — R56.9 SEIZURE (HCC): ICD-10-CM

## 2025-02-07 PROCEDURE — 99214 OFFICE O/P EST MOD 30 MIN: CPT | Performed by: FAMILY MEDICINE

## 2025-02-07 PROCEDURE — 3078F DIAST BP <80 MM HG: CPT | Performed by: FAMILY MEDICINE

## 2025-02-07 PROCEDURE — 3074F SYST BP LT 130 MM HG: CPT | Performed by: FAMILY MEDICINE

## 2025-02-07 RX ORDER — SEMAGLUTIDE 1.34 MG/ML
INJECTION, SOLUTION SUBCUTANEOUS
Qty: 0.5 ML | Refills: 0 | Status: SHIPPED | OUTPATIENT
Start: 2025-02-07

## 2025-02-07 RX ORDER — SEMAGLUTIDE 1.34 MG/ML
INJECTION, SOLUTION SUBCUTANEOUS
Qty: 3 ML | Refills: 0 | Status: SHIPPED | OUTPATIENT
Start: 2025-02-07

## 2025-02-07 RX ORDER — SEMAGLUTIDE 0.68 MG/ML
0.5 INJECTION, SOLUTION SUBCUTANEOUS
Qty: 3 ML | Refills: 0 | Status: SHIPPED | OUTPATIENT
Start: 2025-02-07

## 2025-02-07 ASSESSMENT — FIBROSIS 4 INDEX: FIB4 SCORE: 0.47

## 2025-02-07 NOTE — PROGRESS NOTES
This medical record contains text that has been entered with the assistance of computer voice recognition and dictation software.  Therefore, it may contain unintended errors in text, spelling, punctuation, or grammar      Verbal consent was acquired by the patient to use [employer] ambient listening note generation during this visit Yes       Chief Complaint   Patient presents with    Follow-Up     6 month     Numbness     Right leg has sometimes numbness and dad noticed that Braydon throws his leg towards the left          Braydon Sotelo is a 29 y.o. male here evaluation and management of: 6 month follow up      HPI:           1. Mixed hyperlipidemia      2. Seizure (HCC)      3. Obesity (BMI 30-39.9)    History of Present Illness  The patient is a 29-year-old male who presents for evaluation of weight management, cholesterol management, and vitamin D deficiency.    He has been observed to exhibit an unusual gait, characterized by a tendency to kick his left leg outward. This is accompanied by intermittent episodes of numbness in both legs, lasting a few seconds, after which his gait normalizes. He reports feeling significantly better following a recent illness. He has previously taken Zyrtec without any adverse reactions but has not taken it concurrently with Briviact. His neurologist has expressed satisfaction with his laboratory results, noting an improvement in his cholesterol levels. He attributes this to a dietary change, which includes increased meat consumption and a reduction in salad intake. He is currently employed part-time, working three days a week, and spends his leisure time socializing with friends at the mall or dining out. He also enjoys dancing.    He is currently on gemfibrozil for cholesterol management.    He is on vitamin D supplements, but his insurance does not cover the cost. He has been advised to submit a request to the pharmacy for potential coverage. If he were to purchase  over-the-counter vitamin D, he would require six bottles per month to meet his dosage needs.    Supplemental Information  He has dental insurance now but has not found a dentist yet.            Current medicines (including changes today)  Current Outpatient Medications   Medication Sig Dispense Refill    Semaglutide,0.25 or 0.5MG/DOS, (OZEMPIC, 0.25 OR 0.5 MG/DOSE,) 2 MG/1.5ML Solution Pen-injector MONTH #1 Inject 0.25 mg subcutaneously q. 7 days 0.5 mL 0    Semaglutide, 1 MG/DOSE, (OZEMPIC, 1 MG/DOSE,) 4 MG/3ML Solution Pen-injector inject 1mg SC Q7 days 3 mL 0    Semaglutide,0.25 or 0.5MG/DOS, (OZEMPIC, 0.25 OR 0.5 MG/DOSE,) 2 MG/3ML Solution Pen-injector Inject 0.5 mg under the skin every 7 days. Month #2, inject 0.5 mg subcutaneously q. 7 days 3 mL 0    Ascorbic Acid 500 MG Cap TAKE 2 TABS PO TID 30 Capsule 0    BRIVIACT 50 MG Tab tablet Take 1 Tablet by mouth 2 times a day for 180 days. 60 Tablet 5    clonazePAM (KLONOPIN) 1 MG Tab Take 1 Tablet by mouth every evening for 180 days. 90 Tablet 1    LamoTRIgine 200 MG TABLET SR 24 HR Take 1 Tablet by mouth every morning for 360 days. 90 Tablet 3    vitamin D2, Ergocalciferol, (DRISDOL) 1.25 MG (22519 UT) Cap capsule Take 1 Capsule by mouth every 7 days. 5 Capsule 5    Multiple Vitamins-Minerals (MULTIVITAMIN MEN PO) Take  by mouth.      citalopram (CELEXA) 20 MG Tab Take 1 Tablet by mouth at bedtime. 90 Tablet 2    Cyanocobalamin (B-12 PO) Take 1 Tablet by mouth every day.      gemfibrozil (LOPID) 600 MG Tab Take 1 Tablet by mouth 2 times a day. 180 Tablet 0    ibuprofen (MOTRIN) 200 MG Tab Take 400 mg by mouth one time.       No current facility-administered medications for this visit.     He  has a past medical history of Autism, Autism spectrum disorder, and Seizure (HCC).  He  has a past surgical history that includes tonsillectomy and adenoidectomy; other (1/10/2014); and tonsillectomy.  Social History     Tobacco Use    Smoking status: Never    Smokeless  "tobacco: Never   Vaping Use    Vaping status: Never Used   Substance Use Topics    Alcohol use: No     Alcohol/week: 0.0 oz    Drug use: No     Social History     Social History Narrative    Not on file     Family History   Problem Relation Age of Onset    No Known Problems Mother     Sleep Apnea Father     Hypertension Father     Obesity Father     Lung Disease Paternal Aunt     Lung Disease Paternal Grandmother     No Known Problems Maternal Grandmother     Diabetes Maternal Grandfather     Hyperlipidemia Paternal Grandfather      Family Status   Relation Name Status    Mo  Alive    Fa  Alive    PAunt      PGMo      MGMo      MGFa      PGFa     No partnership data on file         ROS    The pertinent  ROS findings can be seen in the HPI above.     All other systems reviewed and are negative     Objective:     /72 (BP Location: Left arm, Patient Position: Sitting, BP Cuff Size: Large adult)   Pulse 79   Temp 36.6 °C (97.9 °F) (Temporal)   Ht 1.778 m (5' 10\")   Wt 110 kg (242 lb)   SpO2 95%  Body mass index is 34.72 kg/m².      Physical Exam:    Constitutional: Alert, no distress.  Skin: No suspicious lesions  Eye: Equal, round and reactive, conjunctiva clear, lids normal.  ENMT: Lips without lesions, good dentition, oropharynx clear.  Neck: Trachea midline, no masses, no thyromegaly. No cervical or supraclavicular lymphadenopathy.  Respiratory: Unlabored respiratory effort, lungs clear to auscultation, no wheezes, no ronchi.  Cardiovascular: Normal S1, S2, no murmur, no edema  Abdomen: Soft, non-tender, no masses, no hepatosplenomegaly.        Assessment and Plan:   The following treatment plan was discussed    All recent labs and provider notes reviewed    Assessment & Plan  1. Weight management.  A prescription for Ozempic has been issued to his pharmacy, with the expectation that it will be covered by his insurance. He is advised to administer the medication " once weekly.    2. Cholesterol management.  His cholesterol levels have improved, likely due to his current medication regimen, which includes gemfibrozil. He should continue taking gemfibrozil as prescribed.    3. Vitamin D deficiency.  A request will be submitted to his pharmacy to cover the prescription for vitamin D, as over-the-counter options would require him to take six bottles a month to meet the necessary dose.    Follow-up  The patient will follow up in 6 months.        1. Mixed hyperlipidemia  - Semaglutide,0.25 or 0.5MG/DOS, (OZEMPIC, 0.25 OR 0.5 MG/DOSE,) 2 MG/1.5ML Solution Pen-injector; MONTH #1 Inject 0.25 mg subcutaneously q. 7 days  Dispense: 0.5 mL; Refill: 0  - Semaglutide, 1 MG/DOSE, (OZEMPIC, 1 MG/DOSE,) 4 MG/3ML Solution Pen-injector; inject 1mg SC Q7 days  Dispense: 3 mL; Refill: 0  - Semaglutide,0.25 or 0.5MG/DOS, (OZEMPIC, 0.25 OR 0.5 MG/DOSE,) 2 MG/3ML Solution Pen-injector; Inject 0.5 mg under the skin every 7 days. Month #2, inject 0.5 mg subcutaneously q. 7 days  Dispense: 3 mL; Refill: 0    2. Seizure (HCC)    3. Obesity (BMI 30-39.9)  - Semaglutide,0.25 or 0.5MG/DOS, (OZEMPIC, 0.25 OR 0.5 MG/DOSE,) 2 MG/1.5ML Solution Pen-injector; MONTH #1 Inject 0.25 mg subcutaneously q. 7 days  Dispense: 0.5 mL; Refill: 0  - Semaglutide, 1 MG/DOSE, (OZEMPIC, 1 MG/DOSE,) 4 MG/3ML Solution Pen-injector; inject 1mg SC Q7 days  Dispense: 3 mL; Refill: 0  - Semaglutide,0.25 or 0.5MG/DOS, (OZEMPIC, 0.25 OR 0.5 MG/DOSE,) 2 MG/3ML Solution Pen-injector; Inject 0.5 mg under the skin every 7 days. Month #2, inject 0.5 mg subcutaneously q. 7 days  Dispense: 3 mL; Refill: 0             Instructed to Follow up in clinic or ER for worsening symptoms, difficulty breathing, lack of expected recovery, or should new symptoms or problems arise.    Followup: Return in about 6 months (around 8/7/2025) for Reevaluation, labs.

## 2025-02-26 ENCOUNTER — OFFICE VISIT (OUTPATIENT)
Dept: MEDICAL GROUP | Age: 30
End: 2025-02-26
Payer: MEDICARE

## 2025-02-26 VITALS
HEIGHT: 70 IN | SYSTOLIC BLOOD PRESSURE: 116 MMHG | BODY MASS INDEX: 34.07 KG/M2 | HEART RATE: 89 BPM | DIASTOLIC BLOOD PRESSURE: 70 MMHG | TEMPERATURE: 97.9 F | WEIGHT: 238 LBS | OXYGEN SATURATION: 93 %

## 2025-02-26 DIAGNOSIS — R05.9 COUGH, UNSPECIFIED TYPE: ICD-10-CM

## 2025-02-26 LAB
FLUAV RNA SPEC QL NAA+PROBE: NEGATIVE
FLUBV RNA SPEC QL NAA+PROBE: NEGATIVE
RSV RNA SPEC QL NAA+PROBE: NEGATIVE
SARS-COV-2 RNA RESP QL NAA+PROBE: NEGATIVE

## 2025-02-26 RX ORDER — LORATADINE 10 MG/1
10 TABLET ORAL DAILY
Qty: 30 TABLET | Refills: 0 | Status: SHIPPED | OUTPATIENT
Start: 2025-02-26

## 2025-02-26 RX ORDER — AZELASTINE 1 MG/ML
1 SPRAY, METERED NASAL 2 TIMES DAILY
Qty: 30 ML | Refills: 2 | Status: SHIPPED | OUTPATIENT
Start: 2025-02-26

## 2025-02-26 RX ORDER — GUAIFENESIN 600 MG/1
600 TABLET, EXTENDED RELEASE ORAL EVERY 12 HOURS
Qty: 14 TABLET | Refills: 0 | Status: SHIPPED | OUTPATIENT
Start: 2025-02-26 | End: 2025-03-05

## 2025-02-26 ASSESSMENT — FIBROSIS 4 INDEX: FIB4 SCORE: 0.47

## 2025-02-26 NOTE — PROGRESS NOTES
This medical record contains text that has been entered with the assistance of computer voice recognition and dictation software.  Therefore, it may contain unintended errors in text, spelling, punctuation, or grammar      Verbal consent was acquired by the patient to use [employer] ambient listening note generation during this visit Yes       Chief Complaint   Patient presents with    Cough     Nasal congestion symptoms started Sunday-- runny nose, cough, congestion, and body aches          Braydon Sotelo is a 29 y.o. male here evaluation and management of: cough      HPI:           1. Cough, unspecified type  History of Present Illness  The patient is a 29-year-old male who presents for evaluation of cough, weight management, and medication management.    He reports experiencing rhinorrhea, which he attributes to a recent illness in his mother. He has been coughing late at night, which is keeping him awake. He has missed 2 days of work. He has undergone 2 swab tests consecutively. He reports a slight improvement in his condition but experiences a sensation of mucus accumulation in his throat, which he attempts to expectorate. He also reports postnasal drip. He has been self-medicating with over-the-counter cough syrup from [employer] and Zyrtec but believes the latter may have ceased to be effective. He has a known adverse reaction to PHENTERMINE.    He has expressed interest in undergoing bariatric surgery, as suggested by his brother, who believes it may alleviate his back pain. His brother has offered to finance the procedure.    He was informed that his Ozempic was denied by his insurance.    FAMILY HISTORY  His brother had gastric sleeve surgery and has 2 artificial knees and a hip.            Current medicines (including changes today)  Current Outpatient Medications   Medication Sig Dispense Refill    guaiFENesin ER (MUCINEX) 600 MG TABLET SR 12 HR Take 1 Tablet by mouth every 12 hours for 7 days. 14  Tablet 0    loratadine (CLARITIN) 10 MG Tab Take 1 Tablet by mouth every day. 30 Tablet 0    azelastine (ASTELIN) 137 MCG/SPRAY nasal spray Administer 1 Spray into affected nostril(S) 2 times a day. 30 mL 2    Semaglutide,0.25 or 0.5MG/DOS, (OZEMPIC, 0.25 OR 0.5 MG/DOSE,) 2 MG/1.5ML Solution Pen-injector MONTH #1 Inject 0.25 mg subcutaneously q. 7 days 0.5 mL 0    Semaglutide, 1 MG/DOSE, (OZEMPIC, 1 MG/DOSE,) 4 MG/3ML Solution Pen-injector inject 1mg SC Q7 days 3 mL 0    Semaglutide,0.25 or 0.5MG/DOS, (OZEMPIC, 0.25 OR 0.5 MG/DOSE,) 2 MG/3ML Solution Pen-injector Inject 0.5 mg under the skin every 7 days. Month #2, inject 0.5 mg subcutaneously q. 7 days 3 mL 0    Ascorbic Acid 500 MG Cap TAKE 2 TABS PO TID 30 Capsule 0    BRIVIACT 50 MG Tab tablet Take 1 Tablet by mouth 2 times a day for 180 days. 60 Tablet 5    clonazePAM (KLONOPIN) 1 MG Tab Take 1 Tablet by mouth every evening for 180 days. 90 Tablet 1    LamoTRIgine 200 MG TABLET SR 24 HR Take 1 Tablet by mouth every morning for 360 days. 90 Tablet 3    vitamin D2, Ergocalciferol, (DRISDOL) 1.25 MG (91192 UT) Cap capsule Take 1 Capsule by mouth every 7 days. 5 Capsule 5    Multiple Vitamins-Minerals (MULTIVITAMIN MEN PO) Take  by mouth.      citalopram (CELEXA) 20 MG Tab Take 1 Tablet by mouth at bedtime. 90 Tablet 2    Cyanocobalamin (B-12 PO) Take 1 Tablet by mouth every day.      gemfibrozil (LOPID) 600 MG Tab Take 1 Tablet by mouth 2 times a day. 180 Tablet 0    ibuprofen (MOTRIN) 200 MG Tab Take 400 mg by mouth one time.       No current facility-administered medications for this visit.     He  has a past medical history of Autism, Autism spectrum disorder, and Seizure (HCC).  He  has a past surgical history that includes tonsillectomy and adenoidectomy; other (1/10/2014); and tonsillectomy.  Social History     Tobacco Use    Smoking status: Never    Smokeless tobacco: Never   Vaping Use    Vaping status: Never Used   Substance Use Topics    Alcohol use: No  "    Alcohol/week: 0.0 oz    Drug use: No     Social History     Social History Narrative    Not on file     Family History   Problem Relation Age of Onset    No Known Problems Mother     Sleep Apnea Father     Hypertension Father     Obesity Father     Lung Disease Paternal Aunt     Lung Disease Paternal Grandmother     No Known Problems Maternal Grandmother     Diabetes Maternal Grandfather     Hyperlipidemia Paternal Grandfather      Family Status   Relation Name Status    Mo  Alive    Fa  Alive    PAunt      PGMo      MGMo      MGFa      PGFa     No partnership data on file         ROS    The pertinent  ROS findings can be seen in the HPI above.     All other systems reviewed and are negative     Objective:     /70 (BP Location: Left arm, Patient Position: Sitting, BP Cuff Size: Large adult)   Pulse 89   Temp 36.6 °C (97.9 °F) (Temporal)   Ht 1.778 m (5' 10\")   Wt 108 kg (238 lb)   SpO2 93%  Body mass index is 34.15 kg/m².      Physical Exam:    Constitutional: Alert, no distress.  Skin: No suspicious lesions  Eye: Equal, round and reactive, conjunctiva clear, lids normal.  ENMT: Lips without lesions, good dentition, oropharynx clear.  Neck: Trachea midline, no masses, no thyromegaly. No cervical or supraclavicular lymphadenopathy.  Respiratory: Unlabored respiratory effort, lungs clear to auscultation, no wheezes, no ronchi.  Cardiovascular: Normal S1, S2, no murmur, no edema  Abdomen: Soft, non-tender, no masses, no hepatosplenomegaly.        Assessment and Plan:   The following treatment plan was discussed    All recent labs and provider notes reviewed    Assessment & Plan  1. Cough.  He reports a runny nose and cough, which has been keeping him awake at night and causing him to miss work. He has been taking generic cough syrup and Zyrtec at home. Mucinex will be prescribed to manage his symptoms. Claritin will also be provided to address potential allergies. " A note for his employer will be issued to excuse his absence from work due to illness. If symptoms persist, further evaluation will be considered.    2. Weight management.  He has lost some weight, currently at 241 pounds, potentially due to a decreased appetite from being sick.    3. Medication management.  His Ozempic prescription was denied by his insurance. Prior authorization will be pursued         1. Cough, unspecified type  - POCT Cepheid CoV-2, Flu A/B, RSV - PCR  - guaiFENesin ER (MUCINEX) 600 MG TABLET SR 12 HR; Take 1 Tablet by mouth every 12 hours for 7 days.  Dispense: 14 Tablet; Refill: 0  - loratadine (CLARITIN) 10 MG Tab; Take 1 Tablet by mouth every day.  Dispense: 30 Tablet; Refill: 0  - azelastine (ASTELIN) 137 MCG/SPRAY nasal spray; Administer 1 Spray into affected nostril(S) 2 times a day.  Dispense: 30 mL; Refill: 2             Instructed to Follow up in clinic or ER for worsening symptoms, difficulty breathing, lack of expected recovery, or should new symptoms or problems arise.    Followup: Return in about 6 months (around 8/26/2025) for Reevaluation, labs.

## 2025-02-26 NOTE — LETTER
February 26, 2025         Patient: Braydon Sotelo   YOB: 1995   Date of Visit: 2/26/2025           To Whom it May Concern:    Braydon Sotelo was seen in my clinic on 2/26/2025. He should be excused from work until Feb. 28th, 2025    If you have any questions or concerns, please don't hesitate to call.        Sincerely,           Rajat Burciaga M.D.

## 2025-03-17 DIAGNOSIS — E78.2 MIXED HYPERLIPIDEMIA: ICD-10-CM

## 2025-03-17 NOTE — TELEPHONE ENCOUNTER
Received request via: Pharmacy    Was the patient seen in the last year in this department? Yes    Does the patient have an active prescription (recently filled or refills available) for medication(s) requested? No    Pharmacy Name: [id number] - [place], [place] - [address]     Does the patient have MCC Plus and need 100-day supply? (This applies to ALL medications) Yes, quantity updated to 100 days

## 2025-03-18 RX ORDER — GEMFIBROZIL 600 MG/1
600 TABLET, FILM COATED ORAL 2 TIMES DAILY
Qty: 200 TABLET | Refills: 0 | Status: SHIPPED | OUTPATIENT
Start: 2025-03-18

## 2025-05-02 ENCOUNTER — OFFICE VISIT (OUTPATIENT)
Dept: NEUROLOGY | Facility: MEDICAL CENTER | Age: 30
End: 2025-05-02
Attending: STUDENT IN AN ORGANIZED HEALTH CARE EDUCATION/TRAINING PROGRAM
Payer: MEDICARE

## 2025-05-02 VITALS
OXYGEN SATURATION: 93 % | BODY MASS INDEX: 35.89 KG/M2 | WEIGHT: 242.29 LBS | DIASTOLIC BLOOD PRESSURE: 58 MMHG | HEART RATE: 64 BPM | SYSTOLIC BLOOD PRESSURE: 110 MMHG | TEMPERATURE: 98 F | HEIGHT: 69 IN

## 2025-05-02 DIAGNOSIS — G47.33 OSA (OBSTRUCTIVE SLEEP APNEA): ICD-10-CM

## 2025-05-02 DIAGNOSIS — G40.309 GENERALIZED CONVULSIVE EPILEPSY (HCC): ICD-10-CM

## 2025-05-02 DIAGNOSIS — F39 MILD MOOD DISORDER (HCC): ICD-10-CM

## 2025-05-02 DIAGNOSIS — G40.011 PARTIAL IDIOPATHIC EPILEPSY WITH SEIZURES OF LOCALIZED ONSET, INTRACTABLE, WITH STATUS EPILEPTICUS (HCC): ICD-10-CM

## 2025-05-02 DIAGNOSIS — F84.0 AUTISM SPECTRUM DISORDER: ICD-10-CM

## 2025-05-02 DIAGNOSIS — G40.919 BREAKTHROUGH SEIZURE (HCC): ICD-10-CM

## 2025-05-02 DIAGNOSIS — E55.9 VITAMIN D DEFICIENCY: ICD-10-CM

## 2025-05-02 DIAGNOSIS — F84.0 AUTISM: ICD-10-CM

## 2025-05-02 DIAGNOSIS — E78.2 MIXED HYPERLIPIDEMIA: ICD-10-CM

## 2025-05-02 DIAGNOSIS — F41.9 ANXIETY DISORDER, UNSPECIFIED TYPE: ICD-10-CM

## 2025-05-02 DIAGNOSIS — Z91.148 VARIABLE COMPLIANCE WITH MEDICATION THERAPY: ICD-10-CM

## 2025-05-02 DIAGNOSIS — G47.33 OBSTRUCTIVE SLEEP APNEA SYNDROME: ICD-10-CM

## 2025-05-02 DIAGNOSIS — G44.53 THUNDERCLAP HEADACHE: ICD-10-CM

## 2025-05-02 DIAGNOSIS — Z72.821 POOR SLEEP HYGIENE: ICD-10-CM

## 2025-05-02 PROCEDURE — 99212 OFFICE O/P EST SF 10 MIN: CPT | Performed by: STUDENT IN AN ORGANIZED HEALTH CARE EDUCATION/TRAINING PROGRAM

## 2025-05-02 PROCEDURE — 3074F SYST BP LT 130 MM HG: CPT | Performed by: STUDENT IN AN ORGANIZED HEALTH CARE EDUCATION/TRAINING PROGRAM

## 2025-05-02 PROCEDURE — 99214 OFFICE O/P EST MOD 30 MIN: CPT | Performed by: STUDENT IN AN ORGANIZED HEALTH CARE EDUCATION/TRAINING PROGRAM

## 2025-05-02 PROCEDURE — 3078F DIAST BP <80 MM HG: CPT | Performed by: STUDENT IN AN ORGANIZED HEALTH CARE EDUCATION/TRAINING PROGRAM

## 2025-05-02 ASSESSMENT — FIBROSIS 4 INDEX: FIB4 SCORE: 0.47

## 2025-05-02 ASSESSMENT — PATIENT HEALTH QUESTIONNAIRE - PHQ9: CLINICAL INTERPRETATION OF PHQ2 SCORE: 0

## 2025-05-02 NOTE — PROGRESS NOTES
"NEUROLOGY FOLLOW-UP - 05/02/2025     REASON FOR VISIT: Braydon Sotelo 29 y.o. male presents today for follow-up     SUMMARY RELEVANT PAST MEDICAL HISTORY   This patient has been referred to me for evaluation / management of Epilepsy. He was seen in ED at [facility] by Dr. Ledezma on May 25 2024 whose note recounts relevant history:     \"28 y.o. man with epilepsy presenting with seizures for whom neurology has been consulted for management.  Some history obtained from the patient.  Additional history from the EMR and care team. Unable to reach patient contact for observer history.      Patient reports that he has had seizures in the past and was diagnosed with epilepsy in 2013.  He does not recall prior medications reports that he currently takes Briviact and lamotrigine and clonazepam.  He does endorse missed dosing of his antiseizure medications recently.  Does not know the dosing of his antiseizure medications.  No known illness or recent sick contacts.     Patient is amnestic to recent seizure activity leading to hospital admission.  His next memory was at [facility] emergency department.     No seizure recurrence this admission.  Patient feels a lot better today however has a sore tongue on the right side.  No new neurologic symptoms.     Patient tells me he does not have a current neurologist with whom he follows.  Last neurology visit in our system appears to be July 2022.  Patient reportedly has 1 seizure type described as generalized tonic-clonic seizures without aura.  Seizures seemingly well-controlled. At that time was on lamictal ER 200mg in am, Keppra ER 1500mg QHS, clonazepam 1mg QHS. Ativan 1mg PRN for breakthrough seizure. At some point was switched to Briviact. Dosing somewhat unclear.      Breakthrough seizure in the setting of missed antiseizure medication dosing as reported by the patient.  No other clear trigger or precipitant identified at this time.  Reportedly taking Lamictal 200 mg " "sustained-release daily, Briviact 50 mg twice daily and Klonopin 1 mg at bedtime. EEG completed; report is pending.  Prior MRI brain from January 2024 and 2013 were non-lesional.  Clinically the patient seems to be feeling much better today and there have been no reported interval seizures.  No further workup is necessary at this time from a neurologic perspective unless the patient has additional seizure activity.  \"     ------------------------------------     Patient is accompanied by his father who assists in providing the history. I have also reviewed notes from his prior neurologists Dr. Cooper.      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 He was given rescue Nayzilam which appears to have aborted seizure.     Patient occasionally does miss his doses. His sleep is sometimes erratic as he plays a lot of video games. He does have sleep apnea.     He is seeing a psychiatrist whom he likes for mood disorder and emotional outbursts. He is on citalopram    COMPENDIUM OF RELEVANT WORK-UP AND TREATMENTS TO DATE:  rEEG May 2024:   Abnormal video EEG recording in the awake, drowsy, and sleep state(s):  - Mild continuous generalized slowing of the background with excess beta activity diffusely. This finding is suggestive of mild cerebral dysfunction of a nonspecific etiology, with medication effects also present.  - No regional slowing or persistent focal asymmetries were seen.  - No epileptiform discharges or other epileptiform phenomena seen.  - No seizures. Clinical correlation is recommended.     Nov 2022 24 hour ambulatory EEG       IMAGING  Jan 2024 MRI Brain wo (done at [facility]) - unremarkable.   Nov 2013 MRI Brain wo - WNL       INTERVAL HISTORY:    Patient reports 2 days ago new onset, severe, sudden , stabbing , right temporal. It lasted 30-60 seconds then went away. He felt brain foggy after " that and went home and took a long nap. He has not had a headache like that before and it did not happen again. He did not have any other neurological symptoms. Denies stiff neck, double vision, fever, chills, or night sweats. Denies any falls or head injury.    Patient has not had any seizures. Last seizure was mid November 2024. \    Current AEDS:  Clonazepam 1 mg QHS  Lamictal  mg QD  Briviact 50 mg BID  Nayzilam 5 mg QHS    + Sleep apnea on CPAP     Patient reports insomnia throughout the week and often feels tired during the day and at work. He frequently takes long naps throughout the week. He also plays with his phone and texts his girlfriend at night    CURRENT MEDICATIONS:  Current Outpatient Medications on File Prior to Visit   Medication Sig Dispense Refill    gemfibrozil (LOPID) 600 MG Tab Take 1 tablet by mouth twice daily 200 Tablet 0    loratadine (CLARITIN) 10 MG Tab Take 1 Tablet by mouth every day. 30 Tablet 0    azelastine (ASTELIN) 137 MCG/SPRAY nasal spray Administer 1 Spray into affected nostril(S) 2 times a day. 30 mL 2    Ascorbic Acid 500 MG Cap TAKE 2 TABS PO TID 30 Capsule 0    BRIVIACT 50 MG Tab tablet Take 1 Tablet by mouth 2 times a day for 180 days. 60 Tablet 5    clonazePAM (KLONOPIN) 1 MG Tab Take 1 Tablet by mouth every evening for 180 days. 90 Tablet 1    vitamin D2, Ergocalciferol, (DRISDOL) 1.25 MG (90680 UT) Cap capsule Take 1 Capsule by mouth every 7 days. 5 Capsule 5    Multiple Vitamins-Minerals (MULTIVITAMIN MEN PO) Take  by mouth.      citalopram (CELEXA) 20 MG Tab Take 1 Tablet by mouth at bedtime. 90 Tablet 2    Cyanocobalamin (B-12 PO) Take 1 Tablet by mouth every day.      ibuprofen (MOTRIN) 200 MG Tab Take 400 mg by mouth one time.      Semaglutide,0.25 or 0.5MG/DOS, (OZEMPIC, 0.25 OR 0.5 MG/DOSE,) 2 MG/1.5ML Solution Pen-injector MONTH #1 Inject 0.25 mg subcutaneously q. 7 days (Patient not taking: Reported on 5/2/2025) 0.5 mL 0    Semaglutide, 1 MG/DOSE,  "(OZEMPIC, 1 MG/DOSE,) 4 MG/3ML Solution Pen-injector inject 1mg SC Q7 days (Patient not taking: Reported on 5/2/2025) 3 mL 0    Semaglutide,0.25 or 0.5MG/DOS, (OZEMPIC, 0.25 OR 0.5 MG/DOSE,) 2 MG/3ML Solution Pen-injector Inject 0.5 mg under the skin every 7 days. Month #2, inject 0.5 mg subcutaneously q. 7 days (Patient not taking: Reported on 5/2/2025) 3 mL 0    LamoTRIgine 200 MG TABLET SR 24 HR Take 1 Tablet by mouth every morning for 360 days. 90 Tablet 3     No current facility-administered medications on file prior to visit.        EXAM:   /58 (BP Location: Left arm, Patient Position: Sitting, BP Cuff Size: Large adult)   Pulse 64   Temp 36.7 °C (98 °F) (Temporal)   Ht 1.753 m (5' 9\")   Wt 110 kg (242 lb 4.6 oz)   SpO2 93%    Wt Readings from Last 5 Encounters:   05/02/25 110 kg (242 lb 4.6 oz)   02/26/25 108 kg (238 lb)   02/07/25 110 kg (242 lb)   01/17/25 112 kg (247 lb)   01/07/25 110 kg (243 lb)      Physical Exam:  Physical Exam  Eyes:      Extraocular Movements: EOM normal. No nystagmus.   Neurological:      Mental Status: He is alert.      Motor: Motor strength is normal.     Coordination: Coordination is intact.   Psychiatric:         Speech: Speech normal.        Neurological Exam   Neurological Exam  Mental Status  Alert. Recent and remote memory are intact. Speech is normal. Follows two-step commands. Attention and concentration are normal. Fund of knowledge is appropriate for level of education.  Some trouble following multistep commands.    Cranial Nerves  CN II: Right funduscopic exam: not visualized. Left funduscopic exam: not visualized.  CN III, IV, VI: Extraocular movements intact bilaterally. No nystagmus. Normal saccades. Normal smooth pursuit.   Right pupil: 3 mm. Round. Reactive to light. Reactive to accommodation.   Left pupil: 3 mm. Round. Reactive to light. Reactive to accommodation.  Relative afferent pupillary defect absent.  CN V: Facial sensation is normal.  CN VII: " Full and symmetric facial movement.  CN IX, X: Palate elevates symmetrically  CN XI: Shoulder shrug strength is normal.  CN XII: Tongue midline without atrophy or fasciculations.    Motor  Normal muscle bulk throughout. No fasciculations present. Normal muscle tone. No abnormal involuntary movements. Strength is 5/5 throughout all four extremities.    Sensory  Light touch is normal in upper and lower extremities.     Reflexes  Deep tendon reflexes are 2+ and symmetric except as noted.    Coordination    Finger-to-nose, rapid alternating movements and heel-to-shin normal bilaterally without dysmetria.    Gait  Casual gait is normal including stance, stride, and arm swing.       ASSESSMENT, EDUCATION, AND COUNSELING:  This is a 29 y.o. male patient who presents to the neurology clinic. We had an extensive discussion about the patient's symptoms, signs, and work-up to date, if any. We discussed potential and/or definitive diagnoses, work-up, and potential treatments.     PLAN:  Medications administered in today's encounter if applicable:       If applicable, the work-up such as labs, imaging, procedures, and/or other testing, referrals, and/or recommended treatment strategies are listed below.  Orders Placed This Encounter    MR-BRAIN-WITH & W/O     Lab Frequency Next Occurrence         Medication List            Accurate as of May 2, 2025 10:03 AM. If you have any questions, ask your nurse or doctor.                CONTINUE taking these medications        Instructions   Ascorbic Acid 500 MG Caps   TAKE 2 TABS PO TID     azelastine 0.1 % nasal spray  Commonly known as: Astelin   Administer 1 Spray into affected nostril(S) 2 times a day.  Dose: 1 Spray     B-12 PO   Take 1 Tablet by mouth every day.  Dose: 1 Tablet     Briviact 50 MG Tabs tablet  Generic drug: brivaracetam   Doctor's comments: Request 60 tabs of 50 mg to cover 30 days plus 5 refills for a total coverage of 180 days.  Take 1 Tablet by mouth 2 times a day  for 180 days.  Dose: 50 mg     citalopram 20 MG Tabs  Commonly known as: CeleXA   Take 1 Tablet by mouth at bedtime.  Dose: 20 mg     clonazePAM 1 MG Tabs  Commonly known as: KlonoPIN   Doctor's comments: Request 90 tabs of 1 mg to cover 90 days plus 1 refill for a total of 180 days  Take 1 Tablet by mouth every evening for 180 days.  Dose: 1 mg     gemfibrozil 600 MG Tabs  Commonly known as: Lopid   Take 1 tablet by mouth twice daily  Dose: 600 mg     ibuprofen 200 MG Tabs  Commonly known as: Motrin   Take 400 mg by mouth one time.  Dose: 400 mg     LamoTRIgine 200 MG Tb24   Take 1 Tablet by mouth every morning for 360 days.  Dose: 1 Tablet     loratadine 10 MG Tabs  Commonly known as: Claritin   Take 1 Tablet by mouth every day.  Dose: 10 mg     MULTIVITAMIN MEN PO   Take  by mouth.     * Ozempic (0.25 or 0.5 MG/DOSE) 2 MG/1.5ML Sopn  Generic drug: Semaglutide(0.25 or 0.5MG/DOS)   MONTH #1 Inject 0.25 mg subcutaneously q. 7 days     * Ozempic (1 MG/DOSE) 4 MG/3ML Sopn  Generic drug: Semaglutide (1 MG/DOSE)   inject 1mg SC Q7 days     * Ozempic (0.25 or 0.5 MG/DOSE) 2 MG/3ML Sopn  Generic drug: Semaglutide(0.25 or 0.5MG/DOS)   Inject 0.5 mg under the skin every 7 days. Month #2, inject 0.5 mg subcutaneously q. 7 days  Dose: 0.5 mg     vitamin D2 (Ergocalciferol) 1.25 MG (90008 UT) Caps capsule  Commonly known as: Drisdol   Take 1 Capsule by mouth every 7 days.  Dose: 50,000 Units           * This list has 3 medication(s) that are the same as other medications prescribed for you. Read the directions carefully, and ask your doctor or other care provider to review them with you.                   Patient with focal epilepsy who has been seizure-free since November 2024. Counseled him on continued adherence and compliance with medication.    Also counseled the patient on practicing better sleep hygiene. He needs to stop taking naps during the day, and put away his phone (and any other light sources) 1-2 hours before  bed.    He had a thunderclap headache that warrants imaging. MRI Brain with and without contrast ordered.     Follow-up in 4 months      BILLING DOCUMENTATION:     The number of minutes of face-to-face time spent in this encounter was I spent a total of 30 minutes on the day of the visit.  . Over 50% of the time of the visit today was spent on counseling and/or coordination of care wtih the patient and/or family, as outlined above in assessment in plan.    Ron Farr MD  Department of Neurology at [facility]  Diplomate of the American Board of Psychiatry and Neurology, General Neurology  Diplomate of American Board of Psychiatry and Neurology, a Member Board of the American Board of Medical Subspecialties, Epilepsy  Director of [facility]'s Level III Comprehensive Epilepsy Program  Professor of Clinical Neurology, [facility].   [address], [address]  [facility] [id number] [phone]   Fax: [phone]  E-mail: [email or web address]

## 2025-05-02 NOTE — PATIENT INSTRUCTIONS
NEUROLOGY CLINIC VISIT WITH DR. FARR     PLEASE READ THIS ENTIRE DOCUMENT CAREFULLY AND COMPLETELY:    First and foremost, you matter to Dr. Farr and you deserve the best care.   Dr. Farr prides himself on providing the best possible care to all his patients. He strives to make each appointment meaningful, so that all your concerns are being addressed and all your neurological problems are being optimally treated. In order to achieve these goals for everyone, Dr. Farr has listed important reminders and the best ways to prepare for each appointment. Please read each item carefully. Thank you!    Due to the high volume of patients we are trying to help, your physician will not be able to respond by phone or in [facility] to your routine concerns between appointments.  This does not reflect a lack of interest or concern for you or your diagnosis.  Please bring these questions and concerns to your appointment where your physician can answer.  Please relay more pressing concerns to our office, either via [facility], or by phone; if not able to reach us please visit nearby Urgent Care Center or Emergency Department.  If any emergent medical needs, please seek emergent medical help and/or call 911.    Also, please note that we are not able to fill out paperwork that might be related to your work, utility company, disability, and/or driving, among others, in between the visits.  Please schedule a dedicated appointment to address any and all paperwork.  This is not due to lack of concern or interest for your disease-related work/administrative problems, but to make sure that we provide the best possible care and to fill out your paperwork in a correct, complete, and timely manner.  ------------------------------------------------------------------------------------------  Please let our office know if you have any changes in your seizure frequency and/characteristics.     Please keep a diary of your seizures and bring it  with you to each appointment.    Please take vitamin D3 [id number] internation units daily.     Please abstain from driving until further notice    If you are a biological female with epilepsy who is of reproductive age, who is actively breastfeeding, and/or who infants/young children:  Please take folic acid 1 mg daily. This is an over-the-counter supplement that is recommended to prevent certain developmental problems in your baby, in case you become pregnant in the future.  It is critical that you let our office know as soon as you become pregnant or plan to become pregnant.  If you are caring for a baby/young child, please make sure to be sitting on a soft surface while holding your baby/young child, so in case you have a seizure, your baby/young child is not injured due to fall.   Please let us know if, while breastfeeding, you observe that your baby is excessively sleepy and/or has other behavioral changes. Because many antiseizure medications are collected in breast milk, some nursing babies can suffer adverse medication effects.    Please note that the following might precipitate seizures:   missed doses of antiseizure medications  being sick with a fever, stress  Fatigue  sleep deprivation or abnormal sleeping patterns  not eating regularly  not drinking enough water  drinking too much alcohol  stopping alcohol suddenly if you are currently using it on a regular/daily basis,   using recreational drugs, among others.    Please note that the following might lead to an injury or even be life-threatening in the event you have a seizure and/or lose awareness while:  being in a large body of water by yourself, such as bath, pool, lake, ocean, among others (risk of drowning)  being on unprotected heights (risk of fall)  being around and/or operating heavy machinery (risk of injury)  being around open fire/hot surfaces (risk of burns)  any other activities/circumstances, in which if you lose awareness, you might  injure yourself and/or others.  -------------------------------------------------------------------------------------------  SUDEP (SUDDEN UNEXPECTED DEATH IN EPILEPSY)  It is important that your seizures are well controlled and you have none or have them rarely. In addition to avoiding injury related to breakthrough seizures, frequent seizures increase risk of SUDEP (sudden unexpected death in epilepsy), where a person goes into a seizure and then never wakes up. The best way to prevent SUDEP is to control your seizures well.   ------------------------------------------------------------------------------------------  Please call for help (crisis line and/or 911) in case you have thoughts of harming yourself and/or others.  ------------------------------------------------------------------------------------------  INSTRUCTIONS FOR YOUR FAMILY/CAREGIVERS:  Please call 911 if the patient has a seizure longer than 2-3 minutes, if seizures are back to back without her recovering to her baseline, or she does not start recovering within 5-10 minutes after the seizure stops. During the seizure - please turn her on her side, please make sure her head is protected (for example, you should put a pillow under her head, if one is available), and please do not put anything in her mouth.   ------------------------------------------------------------------------------------------  PATIENT EXPECTATIONS,  IMPORTANT APPOINTMENT REMINDERS, AND ADDITIONAL HELPFUL TIPS:   REFILLS:   Request refills AT LEAST 1 week in advance to ensure you do not run out of medications    MyChart  It is STRONGLY encouraged that ALL patients sign up for MyChart. It is BY FAR the fastest and most convenient way for both Dr. Farr and patients to obtain timely refills.  If you are having trouble signing up or logging into your account, staff are available to help you. Please ask a medical assistant or staff at the  to assist you.    TEST RESULS:    All labs and diagnostic test results will be reviewed at your next visit, UNLESS  Dr. Farr determines that there are important findings on the tests need to be acted on sooner. Dr. Farr will either call or send a message through [employer] if this is the case.    BE PREPARED PRIOR TO EVERY APPOINTMENT:  All patient are responsible for ensuring that ALL test results that were completed outside of the [employer] system have been received by our Neurology Department PRIOR to your appointment with Dr. Farr.    IMPORTANT:  ALL images (not just the reports) must be sent and uploaded to the [employer] system. Dr. Farr reviews all images personally prior to each visit. Ensuring that ALL the test results and test images are accessible to Dr. Farr prior to your appointment is YOUR responsibility and an important part of making the most out of each appointment.   Bring a government-issues picture ID and an updated insurance card EVERY visit.  It is highly recommended that you bring at every visit a list of the most important topics that you want address. While it may not be possible to address all items on the list in a single visit, preparing a list will ensure that Dr. Farr addresses the items that are most important to you and your health    PAPERWORK, DOCUMENTATION, LETTER REQUESTS:  You must notify the office ahead of your appointment of all paperwork or letter requests.   Please DO NOT wait until the last minute to make these requests. Please give all paperwork to the medical assistant at the start of the appointment and check-in process. Please note that Dr. Farr may not be able complete some types of documentation in a single appointment or even within a single day or week. This is why it is important to communicate paperwork requests prior to your appointment and at least 2 weeks prior to any deadlines.    KNOW ALL YOU MEDICATIONS:   AT EVERY SINGLE APPOINTMENT, please bring a list of every single prescribed,  non-prescribed, and over the counter medication or supplements you are taking, including ones taken on a rare or intermittent basis.  Include the following information for each prescribed or non-prescribed medications:  Name of medication   The strength of EACH pill/capsule/tablet, etc.   The number of pills/capsules/tablets, etc taken per dose  The number and time of day that doses are taken  For every single Supplement that you take on a routine or intermittent basis, you must include:  The Brand Name   A complete list of every single ingredient, compound, vitamin, and/or mineral in each dose, along with the corresponding amounts/strengths of all ingredients, vitamins, minerals, etc., if such information is provided or known  The number of doses taken per day and time of day doses are taken  If medications are taken on an intermittent or as needed basis, please estimate how many days per week or days per month the medications are used  DO NOT just print out your medication list from [facility] or bring a list from a prior appointment or hospitalizations because the information is often often unreliable, inaccurate, outdated, and/or incomplete   The list should be printed or written  If you forget or do not have a list of all the medication, then it is acceptable, although less preferred, to bring all the bottles to the appointment     ARRIVE EARLY FOR ALL VISITS:  Please note that we are unable to accommodate late arrivals as per office policy.  YOU-the patient - (NOT a parent, spouse, or friend) must be physically present at check-in no later than 12 minutes after the scheduled appointment time, or you will be asked to reschedule   Consider scheduling a virtual appointment with Dr. Farr through [facility] as an alternative if transportation to the clinic is difficult or unavailable   Please note, however, that virtual visits can only be scheduled after being an established patient of Dr. Farr. All new appointments  "must be done in-person in clinic  Some insurances will not cover the cost of virtual appointments. Please check with your insurance to find out if these visits are covered    COMMUNICATING URGENT AND NON-URGENT MATTERS:  Your concerns are important and deserve to be heard and addressed. If you have an urgent matter, there are two methods that will ensure your concerns are prioritized appropriately:   Preferred method: Sign-up/Login to your [employer] account and send a message addressed to Dr. Farr or Sonam Harmon (Dr. Farr's assistant). In the subject line, type \"urgent\" followed by a word or phrase describing the situation (For example, write \"Urgent: Out of antiseuzre med and need refill\" or \"Urgent: Severe side effects to new meds\". In doing this, our staff can ensure urgent messages are triaged appropriately and communicated to Dr. Farr that day.  Call [facility] Neurology main line at [phone]. Dr. Farr's voicemail extension is [phone]. When leaving a voice message, specifically indicate if it is urgent (or non-urgent) so that the matter can be triaged appropriately and addressed in a timely manner    Thank you for entrusting your neurological care to [facility] and we look forward to continuing to serve you.   "

## 2025-05-25 DIAGNOSIS — G47.33 OBSTRUCTIVE SLEEP APNEA: ICD-10-CM

## 2025-05-25 DIAGNOSIS — R56.9 SEIZURE (HCC): ICD-10-CM

## 2025-05-25 DIAGNOSIS — E55.9 VITAMIN D DEFICIENCY: ICD-10-CM

## 2025-05-25 DIAGNOSIS — F41.9 ANXIETY DISORDER, UNSPECIFIED TYPE: ICD-10-CM

## 2025-05-25 DIAGNOSIS — G40.309 GENERALIZED CONVULSIVE EPILEPSY (HCC): ICD-10-CM

## 2025-05-27 RX ORDER — ERGOCALCIFEROL 1.25 MG/1
50000 CAPSULE ORAL
Qty: 7 CAPSULE | Refills: 2 | Status: SHIPPED | OUTPATIENT
Start: 2025-05-27

## 2025-05-27 NOTE — TELEPHONE ENCOUNTER
Received request via: Patient    Medication Name/Dosage vitamin D2, Ergocalciferol, (DRISDOL) 1.25 MG (89626 UT) Cap capsule     When was medication last prescribed 11/18/24    How many refills were previously provided 5    How many Refills does he patient have left from last prescription 0    Was the patient seen in the last year in this department? Yes   Date of last office visit 05/02/25     Per last Neurology Office Visit, when was the date of next follow up visit set for?                            Date of office visit follow up request 4 months     Does the patient have an upcoming appointment? Yes   If yes, when 09/05/25             If no, schedule appointment N/A    Does the patient have [facility] Plus and need 100 day supply (blood pressure, diabetes and cholesterol meds only)? No

## 2025-05-28 RX ORDER — ERGOCALCIFEROL 1.25 MG/1
50000 CAPSULE, LIQUID FILLED ORAL
Qty: 5 CAPSULE | Refills: 0 | Status: SHIPPED | OUTPATIENT
Start: 2025-05-28

## 2025-06-16 ENCOUNTER — APPOINTMENT (OUTPATIENT)
Dept: NEUROLOGY | Facility: MEDICAL CENTER | Age: 30
End: 2025-06-16
Attending: STUDENT IN AN ORGANIZED HEALTH CARE EDUCATION/TRAINING PROGRAM
Payer: MEDICARE

## 2025-06-17 RX ORDER — CITALOPRAM HYDROBROMIDE 20 MG/1
20 TABLET ORAL
Qty: 90 TABLET | Refills: 2 | Status: SHIPPED | OUTPATIENT
Start: 2025-06-17

## 2025-07-08 DIAGNOSIS — R56.9 SEIZURE (HCC): ICD-10-CM

## 2025-07-08 DIAGNOSIS — G40.011 PARTIAL IDIOPATHIC EPILEPSY WITH SEIZURES OF LOCALIZED ONSET, INTRACTABLE, WITH STATUS EPILEPTICUS (HCC): ICD-10-CM

## 2025-07-08 DIAGNOSIS — G40.919 BREAKTHROUGH SEIZURE (HCC): ICD-10-CM

## 2025-07-09 ENCOUNTER — TELEPHONE (OUTPATIENT)
Dept: NEUROLOGY | Facility: MEDICAL CENTER | Age: 30
End: 2025-07-09
Payer: MEDICARE

## 2025-07-09 RX ORDER — BRIVARACETAM 50 MG/1
50 TABLET, FILM COATED ORAL 2 TIMES DAILY
Qty: 60 TABLET | Refills: 2 | Status: SHIPPED | OUTPATIENT
Start: 2025-07-09 | End: 2025-10-07

## 2025-07-09 NOTE — TELEPHONE ENCOUNTER
Received request via: Pharmacy    Medication Name/Dosage Briviact 50 Mg    When was medication last prescribed 12/20/24    How many refills were previously provided 5    How many Refills does he patient have left from last prescription 0    Was the patient seen in the last year in this department? Yes   Date of last office visit 05/02/25     Per last Neurology Office Visit, when was the date of next follow up visit set for?                            Date of office visit follow up request 4 months     Does the patient have an upcoming appointment? Yes   If yes, when 09/05/25             If no, schedule appointment done    Does the patient have alf Plus and need 100 day supply (blood pressure, diabetes and cholesterol meds only)? Patient has SCP but the medication not allowed 100 day supply

## 2025-07-11 ENCOUNTER — APPOINTMENT (OUTPATIENT)
Dept: RADIOLOGY | Facility: MEDICAL CENTER | Age: 30
End: 2025-07-11
Attending: STUDENT IN AN ORGANIZED HEALTH CARE EDUCATION/TRAINING PROGRAM
Payer: MEDICARE

## 2025-08-24 ENCOUNTER — HOSPITAL ENCOUNTER (OUTPATIENT)
Dept: RADIOLOGY | Facility: MEDICAL CENTER | Age: 30
End: 2025-08-24
Attending: STUDENT IN AN ORGANIZED HEALTH CARE EDUCATION/TRAINING PROGRAM
Payer: MEDICARE

## 2025-08-24 DIAGNOSIS — G40.011 PARTIAL IDIOPATHIC EPILEPSY WITH SEIZURES OF LOCALIZED ONSET, INTRACTABLE, WITH STATUS EPILEPTICUS (HCC): ICD-10-CM

## 2025-08-24 DIAGNOSIS — G44.53 THUNDERCLAP HEADACHE: ICD-10-CM

## 2025-08-24 PROCEDURE — 700117 HCHG RX CONTRAST REV CODE 255: Mod: JZ,UD | Performed by: STUDENT IN AN ORGANIZED HEALTH CARE EDUCATION/TRAINING PROGRAM

## 2025-08-24 PROCEDURE — A9579 GAD-BASE MR CONTRAST NOS,1ML: HCPCS | Mod: JZ,UD | Performed by: STUDENT IN AN ORGANIZED HEALTH CARE EDUCATION/TRAINING PROGRAM

## 2025-08-24 PROCEDURE — 70553 MRI BRAIN STEM W/O & W/DYE: CPT

## 2025-08-24 RX ORDER — GADOTERIDOL 279.3 MG/ML
20 INJECTION INTRAVENOUS ONCE
Status: COMPLETED | OUTPATIENT
Start: 2025-08-24 | End: 2025-08-24

## 2025-08-24 RX ADMIN — GADOTERIDOL 20 ML: 279.3 INJECTION, SOLUTION INTRAVENOUS at 09:45

## 2025-08-26 ENCOUNTER — OFFICE VISIT (OUTPATIENT)
Dept: MEDICAL GROUP | Age: 30
End: 2025-08-26
Payer: MEDICARE

## 2025-08-26 VITALS
HEART RATE: 72 BPM | BODY MASS INDEX: 35.25 KG/M2 | TEMPERATURE: 98.9 F | DIASTOLIC BLOOD PRESSURE: 76 MMHG | SYSTOLIC BLOOD PRESSURE: 112 MMHG | OXYGEN SATURATION: 97 % | HEIGHT: 69 IN | WEIGHT: 238 LBS | RESPIRATION RATE: 16 BRPM

## 2025-08-26 DIAGNOSIS — Z20.822 EXPOSURE TO COVID-19 VIRUS: Primary | ICD-10-CM

## 2025-08-26 ASSESSMENT — FIBROSIS 4 INDEX: FIB4 SCORE: 0.49

## 2025-08-28 ENCOUNTER — PATIENT MESSAGE (OUTPATIENT)
Dept: MEDICAL GROUP | Age: 30
End: 2025-08-28
Payer: MEDICARE

## 2025-08-29 RX ORDER — CITALOPRAM HYDROBROMIDE 20 MG/1
20 TABLET ORAL
Qty: 90 TABLET | Refills: 2 | Status: SHIPPED | OUTPATIENT
Start: 2025-08-29